# Patient Record
Sex: FEMALE | Race: WHITE | NOT HISPANIC OR LATINO | Employment: UNEMPLOYED | URBAN - METROPOLITAN AREA
[De-identification: names, ages, dates, MRNs, and addresses within clinical notes are randomized per-mention and may not be internally consistent; named-entity substitution may affect disease eponyms.]

---
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
## 2018-01-10 NOTE — MISCELLANEOUS
Message  Returned Samantha's phone call today  She wanted to discuss the frequency in her headaches after initiation of oral contraception  Hematuria states that she has been having 2 migraines a week for the past several months since onset of this new medication back in January 20 thinks that her blood pressures have been normal during this time  She has not had them officially checked  Last time she recalls her blood pressure being checked was in January with the initiation of the OCP  Has not had any recent blood work performed to check her electrolytes  States that headaches are now about once per week  I asked Samantha's to have her blood pressure checked at the [facility]  Mom is going to check to see if she could have her blood work done this week close by school and if not then next week when she returns home after finals are complete  Kalia Mahan to call the office tomorrow with blood pressure reading  She will also notify us if she had a headache around the time of the blood pressure reading  We'll make plans to adjust medication as needed  Return as scheduled for an appointment to see me in the upcoming weeks        Signatures   Electronically signed by : ELIANA Teague ; May  1 2017  3:02PM EST                       (Author)

## 2018-01-10 NOTE — PROGRESS NOTES
Assessment    1  Pseudohypoaldosteronism, type 2 (268 04) (N25 89)    Discussion/Summary    Continue HCTZ for management of blood pressure  BP control is optimal at this time  Discussed ways to increase physical activity to help with weight management and lifestyle changes that are possible while being a college student  To make appt for nutrition on winter break  Return for follow up in 6 months  Reason For Visit  Darnell's syndrome      History of Present Illness  Enzo Farias is a 21year old female who presents for follow up of pseudohypoaldosteronism type II  She is accompanied by her mother who helps to provide the history  Enzo Farias states that she has been compliant with her medication and states that she rarely misses her medication  (Estimates once/week if that often ) Diagnosed with UTI at school recently and had been prescribed Macrobid to which she developed hives  Noted at the visit to school health on campus that her forearm blood pressure was recorded at 170/100  It was not repeated with an arm BP measurement  Occasional blood pressure checks  Estimates 1-2 migraines/week  Overall frustrated about continued weight gain  Has limited options for food on campus at school  No physical activity outside of walking to and from class  She denies any complaints of dizziness with the use of HCTZ  Able to drink and stay well hydrated  Review of Systems    Constitutional: no fever  Integumentary: no rashes  Gastrointestinal: no abdominal pain, no diarrhea and no vomiting  Respiratory: no cough  Cardiovascular: no chest pain and no lower extremity edema  Musculoskeletal: no joint pain  Neurological: as noted in HPI  Genitourinary: no hematuria and as noted in HPI    ENT: no nasal discharge  Past Medical History    The active problems and past medical history were reviewed and updated today  Social History  The social history was reviewed and is unchanged  Current Meds   1  HydroCHLOROthiazide 25 MG Oral Tablet; TAKE 1 TABLET DAILY  Requested for:   66FCB2256; Last Rx:52Gzc4169 Ordered   2  Lo Loestrin Fe 1 MG-10 MCG / 10 MCG Oral Tablet; TAKE 1 TABLET DAILY AS   DIRECTED; Therapy: 67ILO8477 to [phone])  Requested for: 71XUV1203; Last   Rx:73Scq7311 Ordered    Allergies    1  Seasonal    Vitals  Vital Signs    Recorded: 01DHI0949 08:26AM   Heart Rate 85, L Radial   Pulse Quality Normal, L Radial   Systolic 829, RUE, Sitting   Diastolic 68, RUE, Sitting   BP CUFF SIZE Large   Height 5 ft 3 in   Weight 218 lb    BMI Calculated 38 62   BSA Calculated 2 01   BMI Percentile 99 %   2-20 Stature Percentile 31 %   2-20 Weight Percentile 99 %     Physical Exam    Constitutional   General appearance: No acute distress, well appearing and well nourished  obese  Head and Face   Head and face: Normal     Eyes   Conjunctiva and lids: No swelling, erythema or discharge  glasses  Pupils and irises: Equal, round, reactive to light  Ears, Nose, Mouth, and Throat   External inspection of ears and nose: Normal     Otoscopic examination: Tympanic membranes translucent with normal light reflex  Canals patent without erythema  Hearing: Normal     Nasal mucosa, septum, and turbinates: Normal without edema or erythema  Lips, teeth, and gums: Normal, good dentition  Oropharynx: Normal with no erythema, edema, exudate or lesions  Neck   Neck: Supple, symmetric, trachea midline, no masses  Pulmonary   Respiratory effort: No increased work of breathing or signs of respiratory distress  Auscultation of lungs: Clear to auscultation  Cardiovascular   Auscultation of heart: Normal rate and rhythm, normal S1 and S2, no murmurs  Pedal pulses: 2+ bilaterally  Examination of extremities for edema and/or varicosities: Normal     Abdomen   Abdomen: Non-tender, no masses  Liver and spleen: No hepatomegaly or splenomegaly     Lymphatic   Palpation of lymph nodes in neck: No lymphadenopathy  Musculoskeletal   Digits and nails: Normal without clubbing or cyanosis  Skin   Skin and subcutaneous tissue: Normal without rashes or lesions  Neurologic   Cranial nerves: Cranial nerves II-XII intact  Psychiatric   Judgment and insight: Normal     Mood and affect: Normal        Future Appointments    Date/Time Provider Specialty Site   03/29/2018 09:00 AM ELIANA Mares   Pediatrics [facility] 21     Signatures   Electronically signed by : ELIANA Marc ; Oct 16 2017 10:25AM EST                       (Author)

## 2018-01-11 NOTE — PROGRESS NOTES
Assessment    1  Pseudohypoaldosteronism, 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) (N25 89)   2  Obesity (278 00) (E66 9)    Plan    · [facility]  Co-Management  *  Status: Active  Requested for: 68CEA4147   Ordered; For: Obesity; Ordered By: Kendra Zaldivar Performed:  Due: 09TLD6439; Last Updated By: Vilma Maurice; 5/22/2017 10:23:27 AM  Care Summary provided  : Yes    Discussion/Summary    Discussed with Laurence and her mother the importance of continued physical activity in addition to compliance with HCTZ  Reviewed recent labs with Laurence  Potassium is stable  Elevated blood pressure today during her visit  I would like for Laurence to do daily blood pressures for the next week and call the office with the readings  I will also refer Laurence to nutrition to help with the weight gain  I would like for Laurence to return in 3 months to reassess her progress and make adjustments if necessary  Reason For Visit  Darnell's syndrome      History of Present Illness  Laurence is an 23year old female who presents for follow up of pseudohypoaldosteronism type II  She is accompanied by her mother who helps to provide the history  Laurence states that she has been compliant with her medication and rarely misses a dose  Takes her HCTZ at the same time as her OCP  No recent illnesses, ER visits or hospitalizations since her last visit  Laurence had been having frequent headaches while up at school but since she has been home from school, has has one mild HA a few days ago  Thinks that they may have been partly related to getting used to her new OCP but mainly stress  Has gained a significant amount of weight her first year of school  Would like to lose weight as she feels uncomfortable about her body and its appearance  Would like to be more physically active  Checked BPs at home since finishing freshman year and averaging 120s/50s   She denies any complaints of dizziness with the use of HCTZ  Able to drink and stay well hydrated  Review of Systems    Constitutional: as noted in HPI  Integumentary: no rashes  Gastrointestinal: no abdominal pain  Respiratory: no cough  Cardiovascular: no lower extremity edema  Musculoskeletal: no joint pain  Neurological: as noted in HPI  Genitourinary: no dysuria and no hematuria  ENT: no nasal discharge  Past Medical History    The active problems and past medical history were reviewed and updated today  Surgical History    The surgical history was reviewed and updated today  Family History    The family history was reviewed and updated today  Social History  The social history was reviewed and is unchanged  Current Meds   1  HydroCHLOROthiazide 25 MG Oral Tablet; TAKE 1 TABLET DAILY  Requested for:   52VKD6988; Last Rx:66Fyo1063 Ordered   2  Lo Loestrin Fe 1 MG-10 MCG / 10 MCG Oral Tablet; TAKE 1 TABLET DAILY; Therapy: 53WXS7616 to Recorded    Allergies    1  No Known Drug Allergies    2  Seasonal    Vitals  Vital Signs    Recorded: 47UHC1001 08:38AM Recorded: 63CTK9546 08:36AM   Heart Rate 82    Systolic 405, RUE, Sitting    Diastolic 70, RUE, Sitting    Height  5 ft 3 in   Weight  213 lb 2 00 oz   BMI Calculated  37 75   BSA Calculated  1 99     Physical Exam    Constitutional - General appearance: No acute distress, well appearing and well nourished  obese  Head and Face - Head and face: Normocephalic, atraumatic  Eyes - Conjunctiva and lids: No injection, edema or discharge  glasses  Pupils and irises: Equal, round, reactive to light bilaterally  Ophthalmoscopic examination: Optic discs sharp  Ears, Nose, Mouth, and Throat - External inspection of ears and nose: Normal without deformities or discharge  Otoscopic examination: Tympanic membranes gray, translucent with good bony landmarks and light reflex  Canals patent without erythema   Hearing: Normal  Nasal mucosa, septum, and turbinates: Normal, no edema or discharge  Lips, teeth, and gums: Normal, good dentition  Oropharynx: Moist mucosa, normal tongue and tonsils without lesions  Neck - Neck: Supple, symmetric, no masses  Pulmonary - Respiratory effort: Normal respiratory rate and rhythm, no increased work of breathing  Auscultation of lungs: Clear bilaterally  Cardiovascular - Auscultation of heart: Regular rate and rhythm, normal S1 and S2, no murmur  Pedal pulses: Normal, 2+ bilaterally  Examination of extremities for edema and/or varicosities: Normal    Abdomen - Abdomen: Normal bowel sounds, soft, non-tender, no masses  Liver and spleen: No hepatomegaly or splenomegaly  Lymphatic - Palpation of lymph nodes in neck: No anterior or posterior cervical lymphadenopathy  Musculoskeletal - Digits and nails: Normal without clubbing or cyanosis  Neurologic - Cranial nerves: Normal    Psychiatric - judgment and insight: Normal  Mood and affect: Normal       Results/Data  (1) BASIC METABOLIC PROFILE [id number] 10:30AM Iván Lovett Order Number: [id number]     Test Name Result Flag Reference   GLUCOSE,RANDM 97 mg/dL     If the patient is fasting, the ADA then defines impaired fasting glucose as > 100 mg/dL and diabetes as > or equal to 123 mg/dL  SODIUM 141 mmol/L  136-145   POTASSIUM 4 3 mmol/L  3 5-5 3   CHLORIDE 108 mmol/L  100-108   CARBON DIOXIDE 25 mmol/L  21-32   ANION GAP (CALC) 8 mmol/L  4-13   BLOOD UREA NITROGEN 12 mg/dL  5-25   CREATININE 0 64 mg/dL  0 60-1 30   Standardized to IDMS reference method   CALCIUM 9 4 mg/dL  8 3-10 1   eGFR Non-African American      >60 0 ml/min/1 73[address] [facility] Disease Education Program recommendations are as follows:  GFR calculation is accurate only with a steady state creatinine  Chronic Kidney disease less than 60 ml/min/1 73 sq  meters  Kidney failure less than 15 ml/min/1 73 sq  meters       (1) MAGNESIUM [id number] 10:30AM Vincent SARGENT Order Number: [id number]     Test Name Result Flag Reference   MAGNESIUM 2 2 mg/dL  1 6-2 6       Future Appointments    Date/Time Provider Specialty Site   07/19/2017 08:30 AM Collette Rusk, M D  Obstetrics/Gynecology [facility]   08/02/2017 11:00 AM ELIANA Levy   Pediatrics [facility]     Signatures   Electronically signed by : ELIANA Palomino ; May 22 2017 12:04PM EST                       (Author)

## 2018-01-12 VITALS
HEART RATE: 85 BPM | SYSTOLIC BLOOD PRESSURE: 120 MMHG | WEIGHT: 218 LBS | HEIGHT: 63 IN | DIASTOLIC BLOOD PRESSURE: 68 MMHG | BODY MASS INDEX: 38.62 KG/M2

## 2018-01-12 NOTE — PROGRESS NOTES
Assessment    1  Pseudohypoaldosteronism, 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) (N25 89)   2  Obesity (278 00) (E66 9)    Plan    · (1) HEMOGLOBIN A1C; Status:Active; Requested for:[id number];    Perform:[facility]; WXE:[id number];NNIRANQ;  For:Elevated glycated hemoglobin, Obesity; Ordered By:Shawn Vanegas;    · (1) COMPREHENSIVE METABOLIC PANEL; Status:Active; Requested for:[id number];    Perform:[facility]; TQT:[id number];HBYTCKX; For:Pseudohypoaldosteronism, type 2; Ordered By:Shawn Vanegas; Discussion/Summary    Discussed with Laurence and her mother the importance of continued physical activity in addition to compliance with HCTZ  Will have Laurence go for labs today to trend A1c (borderline previously) as well as a CMP to check electrolytes  To have BP check at school or home if having headaches to ensure that we don't have to change dose of HCTZ  Reviewed reasons to seek evaluation and to contact the office  Will plan for repeat labs in 6 months with yearly follow up in the summer when she is home from college  Good luck with school!     The treatment plan was reviewed with the patient/guardian  The patient/guardian understands and agrees with the treatment plan      Reason For Visit  Darnell's syndrome      History of Present Illness  Laurence is an 23year old female who presents for follow up of pseudohypoaldosteronism type II  She is accompanied by her mother who helps to provide the history  She admits that she still misses her medications on average 1- 2 times/week  No recent illnesses, ER visits or hospitalizations since her last visit  Feeling for the most part well- currently working at [employer] and to start as a freshman at [employer] in the fall  Laurence denies frequent headaches and is currently averaging around once a month  Occasionally she will have 2-3 per month but not as frequent as noted previously  Laurence has been staying more active and has lost some weight   She denies any complaints of dizziness with the use of HCTZ  Able to drink and stay well hydrated  Prior lab work revealed stable renal function, borderline A1c of 5 6% and normal TFTs and lipid panel  Aranza Mcleod continues to suffer from migraine headaches  Last headache was yesterday but prior to that approximately 2 weeks ago  Has not checked her BPs at the times of headaches  Having different things that seem to trigger them  No recent fevers or illnesses  Did have a sprain to her ankle a few months prior that has resolved but continues to have issues regarding pain and endurance with activities that she attributes to this  Trying to stay active  Having issues with increased weight gain  Tends to eat a lot at meal times and also late night snacks after dinner seems to be a particular issue per dad  Review of Systems    Constitutional: no fever and as noted in HPI  Gastrointestinal: no abdominal pain and no vomiting  Respiratory: no cough  Cardiovascular: no lower extremity edema  Musculoskeletal: as noted in HPI  Neurological: as noted in HPI  Genitourinary: no dysuria and no hematuria  ENT: no nasal discharge  Past Medical History    The active problems and past medical history were reviewed and updated today  Surgical History    The surgical history was reviewed and updated today  Family History    The family history was reviewed and updated today  Social History  The social history was reviewed and is unchanged  Current Meds   1  Advil CAPS; Therapy: (Recorded:16Mar2015) to Recorded   2  Hydrochlorothiazide 25 MG Oral Tablet; TAKE 1 TABLET DAILY  Requested for:   [id number]; Last Rx:06Apr2016 Ordered    Allergies    1   No Known Drug Allergies    Vitals  Vital Signs [Data Includes: Current Encounter]    Recorded: [id number] 09:25AM Recorded: 15OSD8582 09:22AM   Heart Rate 73     Blood Pressure 68 mm Hg, RUE, Sitting 128 mm Hg, RUE, Sitting    Height   5 ft 2 2 in   Weight   173 lb 2 00 oz   BMI Calculated   31 46 kg/m2   BSA Calculated   1 8 m2     Physical Exam    Constitutional - General appearance: No acute distress, well appearing and well nourished  overweight female  Head and Face - Head and face: Normocephalic, atraumatic  Eyes - Conjunctiva and lids: No injection, edema or discharge  glasses  Pupils and irises: Equal, round, reactive to light bilaterally  Ophthalmoscopic examination: Optic discs sharp  Ears, Nose, Mouth, and Throat - External inspection of ears and nose: Normal without deformities or discharge  Otoscopic examination: Tympanic membranes gray, translucent with good bony landmarks and light reflex  Canals patent without erythema  Hearing: Normal  Nasal mucosa, septum, and turbinates: Normal, no edema or discharge  Lips, teeth, and gums: Normal, good dentition  Oropharynx: Moist mucosa, normal tongue and tonsils without lesions  Neck - Neck: Supple, symmetric, no masses  Pulmonary - Respiratory effort: Normal respiratory rate and rhythm, no increased work of breathing  Auscultation of lungs: Clear bilaterally  Cardiovascular - Auscultation of heart: Regular rate and rhythm, normal S1 and S2, no murmur  Pedal pulses: Normal, 2+ bilaterally  Examination of extremities for edema and/or varicosities: Normal    Abdomen - Abdomen: Normal bowel sounds, soft, non-tender, no masses  Liver and spleen: No hepatomegaly or splenomegaly  Lymphatic - Palpation of lymph nodes in neck: No anterior or posterior cervical lymphadenopathy  Musculoskeletal - Digits and nails: Normal without clubbing or cyanosis     Skin - Skin and subcutaneous tissue: Normal    Neurologic - Cranial nerves: Normal    Psychiatric - judgment and insight: Normal  Mood and affect: Normal       Signatures   Electronically signed by : ELIANA Reyna ; Jul 6 2016 10:07AM EST                       (Author)

## 2018-01-13 VITALS
WEIGHT: 208 LBS | HEIGHT: 63 IN | HEART RATE: 79 BPM | BODY MASS INDEX: 36.86 KG/M2 | DIASTOLIC BLOOD PRESSURE: 88 MMHG | SYSTOLIC BLOOD PRESSURE: 132 MMHG

## 2018-01-14 VITALS
DIASTOLIC BLOOD PRESSURE: 70 MMHG | BODY MASS INDEX: 37.76 KG/M2 | HEIGHT: 63 IN | SYSTOLIC BLOOD PRESSURE: 136 MMHG | WEIGHT: 213.13 LBS | HEART RATE: 82 BPM

## 2018-01-23 VITALS
DIASTOLIC BLOOD PRESSURE: 74 MMHG | WEIGHT: 221 LBS | HEIGHT: 63 IN | BODY MASS INDEX: 39.16 KG/M2 | SYSTOLIC BLOOD PRESSURE: 120 MMHG

## 2018-03-29 ENCOUNTER — OFFICE VISIT (OUTPATIENT)
Dept: NEPHROLOGY | Facility: CLINIC | Age: 18
End: 2018-03-29
Payer: COMMERCIAL

## 2018-03-29 VITALS
HEIGHT: 63 IN | WEIGHT: 225.4 LBS | BODY MASS INDEX: 39.94 KG/M2 | DIASTOLIC BLOOD PRESSURE: 66 MMHG | SYSTOLIC BLOOD PRESSURE: 118 MMHG

## 2018-03-29 DIAGNOSIS — N25.89 PSEUDOHYPOALDOSTERONISM, TYPE 2: Primary | ICD-10-CM

## 2018-03-29 PROCEDURE — 99213 OFFICE O/P EST LOW 20 MIN: CPT | Performed by: PEDIATRICS

## 2018-03-29 RX ORDER — NORETHINDRONE ACETATE AND ETHINYL ESTRADIOL, ETHINYL ESTRADIOL AND FERROUS FUMARATE 1MG-10(24)
KIT ORAL
COMMUNITY
Start: 2018-03-29 | End: 2018-07-23

## 2018-03-29 RX ORDER — ONDANSETRON 8 MG/1
TABLET, ORALLY DISINTEGRATING ORAL AS NEEDED
Refills: 1 | COMMUNITY
Start: 2018-03-02 | End: 2019-11-11

## 2018-03-29 RX ORDER — HYDROCHLOROTHIAZIDE 25 MG/1
25 TABLET ORAL DAILY
Qty: 90 TABLET | Refills: 1 | Status: SHIPPED | OUTPATIENT
Start: 2018-03-29 | End: 2018-11-09 | Stop reason: SDUPTHER

## 2018-03-29 RX ORDER — NAPROXEN 500 MG/1
500 TABLET ORAL 2 TIMES DAILY
Refills: 1 | COMMUNITY
Start: 2018-03-02 | End: 2018-07-23

## 2018-03-29 RX ORDER — HYDROCHLOROTHIAZIDE 25 MG/1
1 TABLET ORAL DAILY
COMMUNITY
End: 2018-03-29 | Stop reason: SDUPTHER

## 2018-03-29 NOTE — PROGRESS NOTES
Pediatric Nephrology Follow Up   Name:Samantha Whitman    WWN:[id number]    Date:3/29/2018        Assessment/Plan   Assessment:   70-year-old female with  Pseudo hypoaldosteronism type 2  Plan:  Diagnoses and all orders for this visit:    Pseudohypoaldosteronism, type 2  -     hydrochlorothiazide (HYDRODIURIL) 25 mg tablet; Take 1 tablet (25 mg total) by mouth daily  -     Basic metabolic panel; Future  -     Magnesium; Future    Other orders  -     LO LOESTRIN FE 1 MG-10 MCG / 10 MCG TABS;   -     naproxen (NAPROSYN) 500 mg tablet; Take 500 mg by mouth 2 (two) times a day  -     Discontinue: hydrochlorothiazide (HYDRODIURIL) 25 mg tablet; Take 1 tablet by mouth daily  -     ondansetron (ZOFRAN-ODT) 8 mg disintegrating tablet; as needed      Patient Instructions   1  Psuedohypoaldosteronism type 2: Continue on current dose of hydrochlorothiazide  BP stable and well controlled  Will check BMP and Mg  Agree with Endocrine evaluation and keep appointment with Nutrition to help with weight management  Follow up in 6 months  HPI: lEana Stephenson is a 21 y  o female who presents for follow up of   Chief Complaint   Patient presents with    Follow-up     Elana Stephenson is accompanied by Her mother who assists in providing the history today  Natasha Munoz has been doing well since her last visit in Nephrology  She states that blood pressures have been stable and well controlled  Has intermittent checks of her blood pressure at [employer] while on campus  Denies any episodes of dizziness  Continues to have headaches although the frequency has improved  States on average 0-1 headaches every 1-2 weeks  Continues to gain weight and has not yet been in to see the nutritionist   Scheduled for May after she finishes this semester at school  No missed doses of her medication  Natasha Munoz to schedule with Endocrinology for evaluation of obesity and excessive sweating        Review of Systems  Constitutional:   Negative for fevers, fatigue or malaise  HEENT: negative for vision or hearing changes  Positive for rhinorrhea, congestion or sore throat  Respiratory: negative for shortness of breath  Positive for cough  Cardiovascular: negative for chest pain, facial or lower extremity edema  Gastrointestinal: negative for abdominal pain, nausea, vomiting or diarrhea  Genitourinary: negative for dysuria, hematuria  Endocrine: as noted above  Musculoskeletal: negative for joint pain or swelling, back pain  Neurologic: negative for dizziness  Hematologic: negative for bruising or bleeding  Integumentary: negative for rashes  Psychiatric/Behavioral: no behavioral changes    The remainder of review of systems as noted per HPI  ? Past Medical History:   Diagnosis Date    Contraception     Elevated glycated hemoglobin     History of asthma     History of UTI     Injury of foot, right     Injury of knee, right     Injury, ankle     Late menses     Obesity     Pain in finger of right hand     Pain in right wrist     Pseudohypoaldosteronism, type 2     Severe headache     Sprain and strain of deltoid (ligament) of ankle      Past Surgical History:   Procedure Laterality Date    TOOTH EXTRACTION        Family History   Problem Relation Age of Onset    Hypertension Mother     Nephrolithiasis Mother     No Known Problems Father     Hypertension Maternal Grandfather     Heart disease Maternal Grandfather     Hypertension Paternal Grandmother     Hypertension Paternal Grandfather     Heart disease Paternal Grandfather     Breast cancer Neg Hx      paternal cousin      Social History     Social History    Marital status: Single     Spouse name: N/A    Number of children: N/A    Years of education: N/A     Occupational History    Not on file       Social History Main Topics    Smoking status: Never Smoker    Smokeless tobacco: Never Used    Alcohol use Yes    Drug use: No    Sexual activity: Not on file     Other Topics Concern    Not on file     Social History Narrative    No narrative on file       Allergies   Allergen Reactions    Nitrofurantoin Hives     Reaction Date: 02Oct2017;     Pollen Extract         Current Outpatient Prescriptions:     hydrochlorothiazide (HYDRODIURIL) 25 mg tablet, Take 1 tablet (25 mg total) by mouth daily, Disp: 90 tablet, Rfl: 1    LO LOESTRIN FE 1 MG-10 MCG / 10 MCG TABS, , Disp: , Rfl:     naproxen (NAPROSYN) 500 mg tablet, Take 500 mg by mouth 2 (two) times a day, Disp: , Rfl: 1    ondansetron (ZOFRAN-ODT) 8 mg disintegrating tablet, as needed, Disp: , Rfl: 1     Objective   Vitals:    03/29/18 0903   BP: 118/66     Height:5' 3 27" (1 607 m)  Weight:102 kg (225 lb 6 4 oz)  BMI: Body mass index is 39 59 kg/m²      Physical Exam:  General: Obese  Awake, alert and in no acute distress  HEENT:  +glasses  Normocephalic, atraumatic, pupils equally round and reactive to light, extraocular movement intact, conjunctiva clear with no discharge  Ears normally set with tympanic membranes visualized  Tympanic membranes without erythema or effusion and canals clear  Nares patent with no discharge  Mucous membranes moist and oropharynx is clear with no erythema or exudate present  Normal dentition  Chest: Normal without deformity  Neck: supple, symmetric with no masses, no cervical lymphadenopathy  Lungs: clear to auscultation bilaterally with no wheezes, rales or rhonchi  Cardiovascular:   Normal S1 and S2  No murmurs, rubs or gallops  Regular rate and rhythm  Abdomen:  Soft, nontender, and nondistended  Normoactive bowel sounds  No hepatosplenomegaly present  Genitourinary:  Deferred  Back:  Straight without deformity  Skin: warm and well perfused  No rashes present  Clothes noted to be damp along legs and abdomen  Extremities:  No cyanosis, clubbing or edema  Pulses 2+ bilaterally  Musculoskeletal:   Full range of motion all four extremities  No joint swelling or tenderness noted  Neurologic: grossly normal neurologic exam with no deficits noted    Psychiatric: normal mood and affect     Lab Results: none  Imaging: none  Other Studies: none    All laboratory results and imaging was reviewed by me and summarized above

## 2018-03-29 NOTE — PATIENT INSTRUCTIONS
1  Psuedohypoaldosteronism type 2: Continue on current dose of hydrochlorothiazide  BP stable and well controlled  Will check BMP and Mg  Agree with Endocrine evaluation and keep appointment with Nutrition to help with weight management  Follow up in 6 months  (If labs are stable, may push out to yearly)

## 2018-03-29 NOTE — LETTER
March 29, 2018     [address]  [address]    Patient: Elana Stephenson   YOB: 2000   Date of Visit: 3/29/2018       Dear Dr Jacquie Webb: Thank you for referring Anthony Ivyadrianna to me for evaluation  Below are my notes for this consultation  If you have questions, please do not hesitate to call me  I look forward to following your patient along with you  Sincerely,        Luther Velasquez MD        CC: No Recipients  Luther Velasquez MD  3/29/2018  1:05 PM  Sign at close encounter    Pediatric Nephrology Follow Up   Ingrid Johns    HGJ:[id number]    Date:3/29/2018        Assessment/Plan   Assessment:   42-year-old female with  Pseudo hypoaldosteronism type 2  Plan:  Diagnoses and all orders for this visit:    Pseudohypoaldosteronism, type 2  -     hydrochlorothiazide (HYDRODIURIL) 25 mg tablet; Take 1 tablet (25 mg total) by mouth daily  -     Basic metabolic panel; Future  -     Magnesium; Future    Other orders  -     LO LOESTRIN FE 1 MG-10 MCG / 10 MCG TABS;   -     naproxen (NAPROSYN) 500 mg tablet; Take 500 mg by mouth 2 (two) times a day  -     Discontinue: hydrochlorothiazide (HYDRODIURIL) 25 mg tablet; Take 1 tablet by mouth daily  -     ondansetron (ZOFRAN-ODT) 8 mg disintegrating tablet; as needed      Patient Instructions   1  Psuedohypoaldosteronism type 2: Continue on current dose of hydrochlorothiazide  BP stable and well controlled  Will check BMP and Mg  Agree with Endocrine evaluation and keep appointment with Nutrition to help with weight management  Follow up in 6 months  HPI: Elana Stephenson is a 21 y  o female who presents for follow up of   Chief Complaint   Patient presents with    Follow-up     Elana Stephenson is accompanied by Her mother who assists in providing the history today  Laurence has been doing well since her last visit in Nephrology    She states that blood pressures have been stable and well controlled  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  Denies any episodes of dizziness  Continues to have headaches although the frequency has improved  States on average 0-1 headaches every 1-2 weeks  Continues to gain weight and has not yet been in to see the nutritionist   Scheduled for May after she finishes this semester at school  No missed doses of her medication  Laurence to schedule with Endocrinology for evaluation of obesity and excessive sweating  Review of Systems  Constitutional:   Negative for fevers, fatigue or malaise  HEENT: negative for vision or hearing changes  Positive for rhinorrhea, congestion or sore throat  Respiratory: negative for shortness of breath  Positive for cough  Cardiovascular: negative for chest pain, facial or lower extremity edema  Gastrointestinal: negative for abdominal pain, nausea, vomiting or diarrhea  Genitourinary: negative for dysuria, hematuria  Endocrine: as noted above  Musculoskeletal: negative for joint pain or swelling, back pain  Neurologic: negative for dizziness  Hematologic: negative for bruising or bleeding  Integumentary: negative for rashes  Psychiatric/Behavioral: no behavioral changes    The remainder of review of systems as noted per HPI  ?       Past Medical History:   Diagnosis Date    Contraception     Elevated glycated hemoglobin     History of asthma     History of UTI     Injury of foot, right     Injury of knee, right     Injury, ankle     Late menses     Obesity     Pain in finger of right hand     Pain in right wrist     Pseudohypoaldosteronism, type 2     Severe headache     Sprain and strain of deltoid (ligament) of ankle      Past Surgical History:   Procedure Laterality Date    TOOTH EXTRACTION        Family History   Problem Relation Age of Onset    Hypertension Mother     Nephrolithiasis Mother     No Known Problems Father    Jaimee Bland Hypertension Maternal Grandfather     Heart disease Maternal Grandfather     Hypertension Paternal Grandmother     Hypertension Paternal Grandfather     Heart disease Paternal Grandfather     Breast cancer Neg Hx      paternal cousin      Social History     Social History    Marital status: Single     Spouse name: N/A    Number of children: N/A    Years of education: N/A     Occupational History    Not on file  Social History Main Topics    Smoking status: Never Smoker    Smokeless tobacco: Never Used    Alcohol use Yes    Drug use: No    Sexual activity: Not on file     Other Topics Concern    Not on file     Social History Narrative    No narrative on file       Allergies   Allergen Reactions    Nitrofurantoin Hives     Reaction Date: 02Oct2017;     Pollen Extract         Current Outpatient Prescriptions:     hydrochlorothiazide (HYDRODIURIL) 25 mg tablet, Take 1 tablet (25 mg total) by mouth daily, Disp: 90 tablet, Rfl: 1    LO LOESTRIN FE 1 MG-10 MCG / 10 MCG TABS, , Disp: , Rfl:     naproxen (NAPROSYN) 500 mg tablet, Take 500 mg by mouth 2 (two) times a day, Disp: , Rfl: 1    ondansetron (ZOFRAN-ODT) 8 mg disintegrating tablet, as needed, Disp: , Rfl: 1     Objective   Vitals:    03/29/18 0903   BP: 118/66     Height:5' 3 27" (1 607 m)  Weight:102 kg (225 lb 6 4 oz)  BMI: Body mass index is 39 59 kg/m²      Physical Exam:  General: Obese  Awake, alert and in no acute distress  HEENT:  +glasses  Normocephalic, atraumatic, pupils equally round and reactive to light, extraocular movement intact, conjunctiva clear with no discharge  Ears normally set with tympanic membranes visualized  Tympanic membranes without erythema or effusion and canals clear  Nares patent with no discharge  Mucous membranes moist and oropharynx is clear with no erythema or exudate present  Normal dentition    Chest: Normal without deformity  Neck: supple, symmetric with no masses, no cervical lymphadenopathy  Lungs: clear to auscultation bilaterally with no wheezes, rales or rhonchi  Cardiovascular:   Normal S1 and S2  No murmurs, rubs or gallops  Regular rate and rhythm  Abdomen:  Soft, nontender, and nondistended  Normoactive bowel sounds  No hepatosplenomegaly present  Genitourinary:  Deferred  Back:  Straight without deformity  Skin: warm and well perfused  No rashes present  Clothes noted to be damp along legs and abdomen  Extremities:  No cyanosis, clubbing or edema  Pulses 2+ bilaterally  Musculoskeletal:   Full range of motion all four extremities  No joint swelling or tenderness noted  Neurologic: grossly normal neurologic exam with no deficits noted    Psychiatric: normal mood and affect     Lab Results: none  Imaging: none  Other Studies: none    All laboratory results and imaging was reviewed by me and summarized above

## 2018-05-14 ENCOUNTER — CLINICAL SUPPORT (OUTPATIENT)
Dept: NUTRITION | Facility: HOSPITAL | Age: 18
End: 2018-05-14
Payer: COMMERCIAL

## 2018-05-14 VITALS — WEIGHT: 227.8 LBS | HEIGHT: 62 IN | BODY MASS INDEX: 41.92 KG/M2

## 2018-05-14 DIAGNOSIS — E66.09 CLASS 2 OBESITY DUE TO EXCESS CALORIES WITHOUT SERIOUS COMORBIDITY WITH BODY MASS INDEX (BMI) OF 39.0 TO 39.9 IN ADULT: ICD-10-CM

## 2018-05-14 PROCEDURE — 97802 MEDICAL NUTRITION INDIV IN: CPT

## 2018-05-14 NOTE — PROGRESS NOTES
Initial Nutrition Assessment Form    Patient Name: Dionne Martines    YOB: 2000    Sex: Female     Assessment Date: 5/14/2018  Start Time: 9:30am Stop Time: 10:30am Total Minutes: 60min     Data:  Present at session: self and mother   Patient Concerns: " Weight gain and wishes to lose weight to feel better"   Medical Dx/Reason for Referral: E66 [id number]   Past Medical History:   Diagnosis Date    Contraception     Elevated glycated hemoglobin     History of asthma     History of UTI     Injury of foot, right     Injury of knee, right     Injury, ankle     Late menses     Obesity     Pain in finger of right hand     Pain in right wrist     Pseudohypoaldosteronism, type 2     Severe headache     Sprain and strain of deltoid (ligament) of ankle        Current Outpatient Prescriptions   Medication Sig Dispense Refill    hydrochlorothiazide (HYDRODIURIL) 25 mg tablet Take 1 tablet (25 mg total) by mouth daily 90 tablet 1    LO LOESTRIN FE 1 MG-10 MCG / 10 MCG TABS       naproxen (NAPROSYN) 500 mg tablet Take 500 mg by mouth 2 (two) times a day  1    ondansetron (ZOFRAN-ODT) 8 mg disintegrating tablet as needed  1     No current facility-administered medications for this visit  Additional Meds/Supplements: Reviewed, denies vitamins   Special Learning Needs: none   Height: HC Readings from Last 3 Encounters:   No data found for [facility]       Weight: Wt Readings from Last 3 Encounters:   03/29/18 102 kg (225 lb 6 4 oz) (99 %, Z= 2 27)*   12/21/17 100 kg (221 lb) (99 %, Z= 2 22)*   10/16/17 98 9 kg (218 lb) (99 %, Z= 2 18)*     * Growth percentiles are based on CDC 2-20 Years data  Estimated body mass index is 39 59 kg/m² as calculated from the following:    Height as of 3/29/18: 5' 3 27" (1 607 m)  Weight as of 3/29/18: 102 kg (225 lb 6 4 oz)         Recent Weight Change: [x]Yes     []No  Amount:  weight gain      Energy Needs: [facility] Equation:  7130 x 1 3= 2269 kcal= 1400 kcal for weight loss   Allergies   Allergen Reactions    Nitrofurantoin Hives     Reaction Date: [id number];     Pollen Extract        History   Alcohol Use    Yes       History   Smoking Status    Never Smoker   Smokeless Tobacco    Never Used       Who shops? mother   Who cooks? mother   Exercise: No formal at this time   Prior Counseling? [x]Yes     []No  When:    Why:         Diet Hx:  Breakfast:  Hazelnut Coffee and 3 creamers and brown sugar  [employer] egg/cornejo sandwich    Lunch: Bowl of Velveeta Mac and Cheese or McDonalds quarter [employer] and fries and soda         Dinner:  Cheeseburger, Pasta, baked Beans         Snacks: Brownies, cake, yogurt sundae        Nutrition Diagnosis:   Overweight/obesity  related to Excess energy intake as evidenced by  BMI more than normative standard for age and sex (obesity-grade II 35-39  9)       Medical Nutrition Therapy Intervention:  [x]Individualized Meal Plan []Understanding Lab Values   [x]Basic Pathophysiology of Disease []Food/Medication Interactions   [x]Food Diary []Exercise   [x]Lifestyle/Behavior Modification Techniques []Medication, Mechanism of Action   [x]Label Reading []Self Blood Glucose Monitoring   [x]Weight/BMI Goals Achieve first goal 204lb (-10% current weight) then BMI <30 []Other -    Other Notes:        Comprehension: []Excellent  []Very Good  [x]Good  []Fair   []Poor    Receptivity: []Excellent  []Very Good  [x]Good  []Fair   []Poor    Expected Compliance: []Excellent  []Very Good  [x]Good  []Fair   []Poor        Goals: 1  Achieve 204 lb as 1st goal (10% of current weight)   2  Food Journal daily with dontae   3  report reading food labels for fiber and protein       2 week follow up  Labs:  CMP  Lab Results   Component Value Date     05/15/2017    K 4 3 05/15/2017     05/15/2017    CO2 25 05/15/2017    ANIONGAP 8 05/15/2017    BUN 12 05/15/2017    CREATININE 0 64 05/15/2017    GLUCOSE 97 05/15/2017    CALCIUM 9 4 05/15/2017    AST 12 08/01/2016    ALT 16 08/01/2016    ALKPHOS 78 08/01/2016    PROT 7 2 08/01/2016    BILITOT 0 20 08/01/2016    EGFR >60 0 05/15/2017       BMP  Lab Results   Component Value Date    GLUCOSE 97 05/15/2017    CALCIUM 9 4 05/15/2017     05/15/2017    K 4 3 05/15/2017    CO2 25 05/15/2017     05/15/2017    BUN 12 05/15/2017    CREATININE 0 64 05/15/2017       Lipids  Lab Results   Component Value Date    CHOL 138 11/01/2015     Lab Results   Component Value Date    HDL 52 11/01/2015     Lab Results   Component Value Date    LDLCALC 75 11/01/2015     Lab Results   Component Value Date    TRIG 54 11/01/2015     No components found for: CHOLHDL    Hemoglobin A1C  Lab Results   Component Value Date    HGBA1C 5 6 08/01/2016       Fasting Glucose  No results found for: GLUF    Insulin     Thyroid  No results found for: TSH, P4JLPSA, V9IEBQC, THYROIDAB    Hepatic Function Panel  Lab Results   Component Value Date    ALT 16 08/01/2016    AST 12 08/01/2016    ALKPHOS 78 08/01/2016    BILITOT 0 20 08/01/2016       Celiac Disease Antibody Panel  No results found for: ENDOMYSIAL IGA, GLIADIN IGA, GLIADIN IGG, IGA, TISSUE TRANSGLUT AB, TTG IGA   Iron  No results found for: IRON, TIBC, FERRITIN    Vitamins  No results found for: VITAMIN B2   No results found for: NICOTINAMIDE, NICOTINIC ACID   No results found for: VITAMINB6  No results found for: [id number]  No results found for: VITB5  No results found for: M7OLYCUO  No results found for: THYROGLB  No results found for: VITAMIN K   No results found for: 25-HYDROXY VIT D   No results found for: VITAMINE     [unfilled]  [address] [place] [id number]

## 2018-05-17 ENCOUNTER — APPOINTMENT (OUTPATIENT)
Dept: LAB | Facility: CLINIC | Age: 18
End: 2018-05-17
Payer: COMMERCIAL

## 2018-05-17 ENCOUNTER — TRANSCRIBE ORDERS (OUTPATIENT)
Dept: LAB | Facility: CLINIC | Age: 18
End: 2018-05-17

## 2018-05-17 DIAGNOSIS — N25.89 PSEUDOHYPOALDOSTERONISM, TYPE 2: ICD-10-CM

## 2018-05-17 LAB
EST. AVERAGE GLUCOSE BLD GHB EST-MCNC: 114 MG/DL
HBA1C MFR BLD: 5.6 % (ref 4.2–6.3)
MAGNESIUM SERPL-MCNC: 2.2 MG/DL (ref 1.6–2.6)
T4 FREE SERPL-MCNC: 1 NG/DL (ref 0.78–1.33)
TSH SERPL DL<=0.05 MIU/L-ACNC: 1.89 UIU/ML (ref 0.46–3.98)

## 2018-05-17 PROCEDURE — 36415 COLL VENOUS BLD VENIPUNCTURE: CPT

## 2018-05-17 PROCEDURE — 84439 ASSAY OF FREE THYROXINE: CPT

## 2018-05-17 PROCEDURE — 83036 HEMOGLOBIN GLYCOSYLATED A1C: CPT

## 2018-05-17 PROCEDURE — 84443 ASSAY THYROID STIM HORMONE: CPT

## 2018-05-17 PROCEDURE — 83735 ASSAY OF MAGNESIUM: CPT

## 2018-05-21 DIAGNOSIS — N25.89 PSEUDOHYPOALDOSTERONISM, TYPE 2: Primary | ICD-10-CM

## 2018-07-16 ENCOUNTER — APPOINTMENT (OUTPATIENT)
Dept: LAB | Facility: HOSPITAL | Age: 18
End: 2018-07-16
Attending: OBSTETRICS & GYNECOLOGY
Payer: COMMERCIAL

## 2018-07-16 ENCOUNTER — TELEPHONE (OUTPATIENT)
Dept: NEPHROLOGY | Facility: CLINIC | Age: 18
End: 2018-07-16

## 2018-07-16 ENCOUNTER — HOSPITAL ENCOUNTER (OUTPATIENT)
Dept: RADIOLOGY | Facility: HOSPITAL | Age: 18
Discharge: HOME | End: 2018-07-16
Attending: OBSTETRICS & GYNECOLOGY
Payer: COMMERCIAL

## 2018-07-16 ENCOUNTER — TRANSCRIBE ORDERS (OUTPATIENT)
Dept: LAB | Facility: HOSPITAL | Age: 18
End: 2018-07-16

## 2018-07-16 ENCOUNTER — TELEPHONE (OUTPATIENT)
Dept: OBGYN CLINIC | Facility: CLINIC | Age: 18
End: 2018-07-16

## 2018-07-16 DIAGNOSIS — O20.0 THREATENED ABORTION: ICD-10-CM

## 2018-07-16 DIAGNOSIS — N25.89 PSEUDOHYPOALDOSTERONISM, TYPE 2: ICD-10-CM

## 2018-07-16 DIAGNOSIS — O20.0 THREATENED ABORTION: Primary | ICD-10-CM

## 2018-07-16 LAB
ANION GAP SERPL CALCULATED.3IONS-SCNC: 6 MMOL/L (ref 4–13)
B-HCG SERPL-ACNC: ABNORMAL MIU/ML
BUN SERPL-MCNC: 7 MG/DL (ref 5–25)
CALCIUM SERPL-MCNC: 8.6 MG/DL (ref 8.3–10.1)
CHLORIDE SERPL-SCNC: 111 MMOL/L (ref 100–108)
CO2 SERPL-SCNC: 22 MMOL/L (ref 21–32)
CREAT SERPL-MCNC: 0.6 MG/DL (ref 0.6–1.3)
GFR SERPL CREATININE-BSD FRML MDRD: 132 ML/MIN/1.73SQ M
GLUCOSE SERPL-MCNC: 106 MG/DL (ref 65–140)
POTASSIUM SERPL-SCNC: 4.6 MMOL/L (ref 3.5–5.3)
SODIUM SERPL-SCNC: 139 MMOL/L (ref 136–145)

## 2018-07-16 PROCEDURE — 36415 COLL VENOUS BLD VENIPUNCTURE: CPT

## 2018-07-16 PROCEDURE — 80048 BASIC METABOLIC PNL TOTAL CA: CPT

## 2018-07-16 PROCEDURE — 84702 CHORIONIC GONADOTROPIN TEST: CPT

## 2018-07-16 PROCEDURE — 76801 OB US < 14 WKS SINGLE FETUS: CPT

## 2018-07-16 NOTE — TELEPHONE ENCOUNTER
Pt informed pelvic u/s  Results -  7 1/2 wk viable IUP, subchorionic bleed 3 9 cm (verbal to KA from radiologist)  Pt informed - only having brown bleeding presently  resched nurse prenatal appt for 7/23/18  Will r/s ob physical when here for visit    Pt to recall if increased or red bleeding, pelvic rest

## 2018-07-16 NOTE — TELEPHONE ENCOUNTER
Pt's QBHCG result from today = 35,405  KA aware  Will schedule pelvic u/s with TV scan - rad order in Epic scheduled for today @ 1:45 pm   Pt informed  Will recall with results

## 2018-07-16 NOTE — TELEPHONE ENCOUNTER
----- Message from Anthony Monroy MD sent at 7/16/2018 11:24 AM EDT -----  Please let [facility] know that blood work is stable with normal potassium level

## 2018-07-16 NOTE — TELEPHONE ENCOUNTER
Pt scheduled for OB initial appt today - cramping 7/15/18 & red-lt pink spotting yesterday & today, passed 2 small clots  Had (+) HPT 7/10/18 - LMP ? - conceived on ocps - states she missed several pills  bl type/rh is O POS  Will have QBHCG this am ([facility]) - lab order in [address]  Will recall with result

## 2018-07-17 ENCOUNTER — TELEPHONE (OUTPATIENT)
Dept: OBGYN CLINIC | Facility: CLINIC | Age: 18
End: 2018-07-17

## 2018-07-17 NOTE — TELEPHONE ENCOUNTER
Pt would like a note for no heavy lifting for work  Pt goes back to work tomorrow  Please call when ready for

## 2018-07-17 NOTE — TELEPHONE ENCOUNTER
Spoke to Laurence about her blood work coming back stable, she is aware  No other concerns at this moment

## 2018-07-23 ENCOUNTER — INITIAL PRENATAL (OUTPATIENT)
Dept: OBGYN CLINIC | Facility: CLINIC | Age: 18
End: 2018-07-23

## 2018-07-23 VITALS — HEIGHT: 62 IN | WEIGHT: 223.6 LBS | BODY MASS INDEX: 41.15 KG/M2

## 2018-07-23 DIAGNOSIS — Z34.01 ENCOUNTER FOR SUPERVISION OF NORMAL FIRST PREGNANCY IN FIRST TRIMESTER: ICD-10-CM

## 2018-07-23 DIAGNOSIS — Z36.9 ANTENATAL SCREENING ENCOUNTER: Primary | ICD-10-CM

## 2018-07-23 PROCEDURE — PNV: Performed by: OBSTETRICS & GYNECOLOGY

## 2018-07-23 NOTE — PROGRESS NOTES
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

## 2018-07-30 ENCOUNTER — ROUTINE PRENATAL (OUTPATIENT)
Dept: OBGYN CLINIC | Facility: CLINIC | Age: 18
End: 2018-07-30

## 2018-07-30 VITALS — SYSTOLIC BLOOD PRESSURE: 132 MMHG | BODY MASS INDEX: 40.71 KG/M2 | WEIGHT: 222.6 LBS | DIASTOLIC BLOOD PRESSURE: 82 MMHG

## 2018-07-30 DIAGNOSIS — Z34.81 PRENATAL CARE, SUBSEQUENT PREGNANCY, FIRST TRIMESTER: Primary | ICD-10-CM

## 2018-07-30 DIAGNOSIS — Z36.9 ANTENATAL SCREENING ENCOUNTER: ICD-10-CM

## 2018-07-30 PROCEDURE — 87070 CULTURE OTHR SPECIMN AEROBIC: CPT | Performed by: OBSTETRICS & GYNECOLOGY

## 2018-07-30 PROCEDURE — PNV: Performed by: OBSTETRICS & GYNECOLOGY

## 2018-07-30 PROCEDURE — 87491 CHLMYD TRACH DNA AMP PROBE: CPT | Performed by: OBSTETRICS & GYNECOLOGY

## 2018-07-30 PROCEDURE — 87591 N.GONORRHOEAE DNA AMP PROB: CPT | Performed by: OBSTETRICS & GYNECOLOGY

## 2018-07-30 PROCEDURE — G0145 SCR C/V CYTO,THINLAYER,RESCR: HCPCS | Performed by: OBSTETRICS & GYNECOLOGY

## 2018-07-30 NOTE — PATIENT INSTRUCTIONS
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

## 2018-07-30 NOTE — PROGRESS NOTES
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

## 2018-07-31 ENCOUNTER — TELEPHONE (OUTPATIENT)
Dept: NEPHROLOGY | Facility: CLINIC | Age: 18
End: 2018-07-31

## 2018-07-31 NOTE — TELEPHONE ENCOUNTER
Laurence called the office because she wanted to inform Dr Natalia Gramajo that she recently found out she is pregnant  She told me that her OB had concerns with her taking HCTZ and wanted her to get in contact with her nephrologist  Laurence stated that she wanted to speak with Dr Natalia Gramajo about this

## 2018-08-01 ENCOUNTER — TELEPHONE (OUTPATIENT)
Dept: OBGYN CLINIC | Facility: CLINIC | Age: 18
End: 2018-08-01

## 2018-08-01 LAB
BACTERIA GENITAL AEROBE CULT: NORMAL
CHLAMYDIA DNA CVX QL NAA+PROBE: NORMAL
LAB AP GYN PRIMARY INTERPRETATION: NORMAL
Lab: NORMAL
N GONORRHOEA DNA GENITAL QL NAA+PROBE: NORMAL

## 2018-08-01 NOTE — TELEPHONE ENCOUNTER
Had lm for pt re: f/u [facility] & nephrologist - return call - informed referral in allscripts for [facility]    Pt has been seeing pediatric nephrologist past 3-4 years - will schedule appt (Darnell's syndrome, HTN)

## 2018-08-09 ENCOUNTER — TELEPHONE (OUTPATIENT)
Dept: OBGYN CLINIC | Facility: CLINIC | Age: 18
End: 2018-08-09

## 2018-08-09 ENCOUNTER — ROUTINE PRENATAL (OUTPATIENT)
Dept: PERINATAL CARE | Facility: CLINIC | Age: 18
End: 2018-08-09
Payer: COMMERCIAL

## 2018-08-09 VITALS
DIASTOLIC BLOOD PRESSURE: 84 MMHG | SYSTOLIC BLOOD PRESSURE: 139 MMHG | BODY MASS INDEX: 40.45 KG/M2 | HEART RATE: 83 BPM | HEIGHT: 62 IN | WEIGHT: 219.8 LBS

## 2018-08-09 DIAGNOSIS — O99.211 OBESITY AFFECTING PREGNANCY IN FIRST TRIMESTER: ICD-10-CM

## 2018-08-09 DIAGNOSIS — Z36.82 ENCOUNTER FOR ANTENATAL SCREENING FOR NUCHAL TRANSLUCENCY: ICD-10-CM

## 2018-08-09 DIAGNOSIS — N25.89 PSEUDOHYPOALDOSTERONISM, TYPE 2: Primary | ICD-10-CM

## 2018-08-09 DIAGNOSIS — O10.919 CHRONIC HYPERTENSION AFFECTING PREGNANCY: ICD-10-CM

## 2018-08-09 DIAGNOSIS — Z3A.12 12 WEEKS GESTATION OF PREGNANCY: ICD-10-CM

## 2018-08-09 PROCEDURE — 76801 OB US < 14 WKS SINGLE FETUS: CPT | Performed by: OBSTETRICS & GYNECOLOGY

## 2018-08-09 PROCEDURE — 99241 PR OFFICE CONSULTATION NEW/ESTAB PATIENT 15 MIN: CPT | Performed by: OBSTETRICS & GYNECOLOGY

## 2018-08-09 PROCEDURE — 76813 OB US NUCHAL MEAS 1 GEST: CPT | Performed by: OBSTETRICS & GYNECOLOGY

## 2018-08-09 NOTE — TELEPHONE ENCOUNTER
I spoke to the patient's the spotting and cramping bleeding is now resolved  She will keep me informed of her progress the happens again

## 2018-08-09 NOTE — PATIENT INSTRUCTIONS
Thank you for choosing [facility] for your  care today  If you have any questions about your ultrasound or care, please do not hesitate to contact us or your primary obstetrician  Please begin taking aspirin 81mg daily for the prevention of preeclampsia  Please try to keep your weight gain to 11-20 pounds this pregnancy; this is best accomplished by regular aerobic exercise and healthy eating

## 2018-08-09 NOTE — TELEPHONE ENCOUNTER
Patient states when went to bathroom had some dark blood and when wiped had dark blood  Non in underwear  Negative cramping  Negative bleeding now  12 weeks pregnant   Please call [phone]

## 2018-08-12 PROBLEM — O10.919 CHRONIC HYPERTENSION AFFECTING PREGNANCY: Status: ACTIVE | Noted: 2018-08-12

## 2018-08-12 PROBLEM — O99.211 OBESITY AFFECTING PREGNANCY IN FIRST TRIMESTER: Status: ACTIVE | Noted: 2018-08-12

## 2018-08-12 PROBLEM — Z3A.12 12 WEEKS GESTATION OF PREGNANCY: Status: ACTIVE | Noted: 2018-08-12

## 2018-08-12 NOTE — PROGRESS NOTES
[address]: Ms Laura Hassan was seen at 12w2d for nuchal translucency ultrasound  See ultrasound report under "OB Procedures" tab  Please don't hesitate to contact our office with any concerns or questions    Ry Ivy MD

## 2018-08-16 ENCOUNTER — TELEPHONE (OUTPATIENT)
Dept: PERINATAL CARE | Facility: CLINIC | Age: 18
End: 2018-08-16

## 2018-08-20 ENCOUNTER — APPOINTMENT (OUTPATIENT)
Dept: LAB | Age: 18
End: 2018-08-20
Payer: COMMERCIAL

## 2018-08-20 DIAGNOSIS — Z36.9 ANTENATAL SCREENING ENCOUNTER: ICD-10-CM

## 2018-08-20 LAB
ABO GROUP BLD: NORMAL
BACTERIA UR QL AUTO: ABNORMAL /HPF
BASOPHILS # BLD AUTO: 0.03 THOUSANDS/ΜL (ref 0–0.1)
BASOPHILS NFR BLD AUTO: 0 % (ref 0–1)
BILIRUB UR QL STRIP: NEGATIVE
BLD GP AB SCN SERPL QL: NEGATIVE
CLARITY UR: CLEAR
COLOR UR: YELLOW
EOSINOPHIL # BLD AUTO: 0.04 THOUSAND/ΜL (ref 0–0.61)
EOSINOPHIL NFR BLD AUTO: 1 % (ref 0–6)
ERYTHROCYTE [DISTWIDTH] IN BLOOD BY AUTOMATED COUNT: 13.8 % (ref 11.6–15.1)
EXTERNAL HIV-1 ANTIBODY: NORMAL
GLUCOSE UR STRIP-MCNC: NEGATIVE MG/DL
HCT VFR BLD AUTO: 40.4 % (ref 34.8–46.1)
HGB BLD-MCNC: 12.5 G/DL (ref 11.5–15.4)
HGB UR QL STRIP.AUTO: ABNORMAL
HYALINE CASTS #/AREA URNS LPF: ABNORMAL /LPF
IMM GRANULOCYTES # BLD AUTO: 0.03 THOUSAND/UL (ref 0–0.2)
IMM GRANULOCYTES NFR BLD AUTO: 0 % (ref 0–2)
KETONES UR STRIP-MCNC: NEGATIVE MG/DL
LEUKOCYTE ESTERASE UR QL STRIP: NEGATIVE
LYMPHOCYTES # BLD AUTO: 1.85 THOUSANDS/ΜL (ref 0.6–4.47)
LYMPHOCYTES NFR BLD AUTO: 24 % (ref 14–44)
MCH RBC QN AUTO: 27.2 PG (ref 26.8–34.3)
MCHC RBC AUTO-ENTMCNC: 30.9 G/DL (ref 31.4–37.4)
MCV RBC AUTO: 88 FL (ref 82–98)
MONOCYTES # BLD AUTO: 0.39 THOUSAND/ΜL (ref 0.17–1.22)
MONOCYTES NFR BLD AUTO: 5 % (ref 4–12)
NEUTROPHILS # BLD AUTO: 5.32 THOUSANDS/ΜL (ref 1.85–7.62)
NEUTS SEG NFR BLD AUTO: 70 % (ref 43–75)
NITRITE UR QL STRIP: NEGATIVE
NON-SQ EPI CELLS URNS QL MICRO: ABNORMAL /HPF
NRBC BLD AUTO-RTO: 0 /100 WBCS
PH UR STRIP.AUTO: 5.5 [PH] (ref 4.5–8)
PLATELET # BLD AUTO: 212 THOUSANDS/UL (ref 149–390)
PMV BLD AUTO: 11.1 FL (ref 8.9–12.7)
PROT UR STRIP-MCNC: NEGATIVE MG/DL
RBC # BLD AUTO: 4.59 MILLION/UL (ref 3.81–5.12)
RBC #/AREA URNS AUTO: ABNORMAL /HPF
RH BLD: POSITIVE
RPR SER QL: NORMAL
RUBV IGG SERPL IA-ACNC: 76.5 IU/ML
SP GR UR STRIP.AUTO: 1.01 (ref 1–1.03)
SPECIMEN EXPIRATION DATE: NORMAL
UROBILINOGEN UR QL STRIP.AUTO: 0.2 E.U./DL
WBC # BLD AUTO: 7.66 THOUSAND/UL (ref 4.31–10.16)
WBC #/AREA URNS AUTO: ABNORMAL /HPF

## 2018-08-20 PROCEDURE — 87086 URINE CULTURE/COLONY COUNT: CPT

## 2018-08-20 PROCEDURE — 81001 URINALYSIS AUTO W/SCOPE: CPT

## 2018-08-20 PROCEDURE — 36415 COLL VENOUS BLD VENIPUNCTURE: CPT

## 2018-08-20 PROCEDURE — 80081 OBSTETRIC PANEL INC HIV TSTG: CPT

## 2018-08-21 LAB — HBV SURFACE AG SER QL: NORMAL

## 2018-08-22 LAB — BACTERIA UR CULT: NORMAL

## 2018-08-23 LAB — HIV 1+2 AB+HIV1 P24 AG SERPL QL IA: NORMAL

## 2018-08-27 ENCOUNTER — ROUTINE PRENATAL (OUTPATIENT)
Dept: OBGYN CLINIC | Facility: CLINIC | Age: 18
End: 2018-08-27

## 2018-08-27 VITALS — DIASTOLIC BLOOD PRESSURE: 80 MMHG | BODY MASS INDEX: 39.87 KG/M2 | WEIGHT: 218 LBS | SYSTOLIC BLOOD PRESSURE: 140 MMHG

## 2018-08-27 DIAGNOSIS — O10.919 CHRONIC HYPERTENSION AFFECTING PREGNANCY: ICD-10-CM

## 2018-08-27 DIAGNOSIS — Z34.02 ENCOUNTER FOR SUPERVISION OF NORMAL FIRST PREGNANCY IN SECOND TRIMESTER: Primary | ICD-10-CM

## 2018-08-27 PROCEDURE — PNV: Performed by: NURSE PRACTITIONER

## 2018-08-27 RX ORDER — ASPIRIN 81 MG/1
81 TABLET ORAL 2 TIMES DAILY
COMMUNITY
End: 2019-11-11

## 2018-08-27 NOTE — PROGRESS NOTES
Patient is doing well  Denies LOF/Bleeding/Cramping  She is not feeling movement yet  She has been experiencing more frequent HA about 3-4 a week  Has been taking Tylenol  I explained if needed she can take Excedrin  MFM report 8/9/18 at 12 2/7 weeks for genetic screening, chronic HTN, VERITO's syndrome and obesity  Nuchal translucency was normal  Nasal bone appear to be present  Size equals dates  NST starting at 32 weeks as well serial growth scans  Part one of the sequential screening was completed  Patient has been taking 81 mg aspirin daily  Advised to increase to twice daily per JSW  Baseline pre-eclamptic labs have been ordered  She is scheduled for her level 2 US next month  We will call her with labs results  RTO in 4 weeks  Laurence was seen today for routine prenatal visit      Diagnoses and all orders for this visit:    Encounter for supervision of normal first pregnancy in second trimester    Chronic hypertension affecting pregnancy  -     CBC and differential  -     Comprehensive metabolic panel  -     Creatinine Clearance 24 Hr  -     Uric acid  -     Protein, urine, 24 hour

## 2018-08-28 ENCOUNTER — OFFICE VISIT (OUTPATIENT)
Dept: NEPHROLOGY | Facility: CLINIC | Age: 18
End: 2018-08-28
Payer: COMMERCIAL

## 2018-08-28 ENCOUNTER — TELEPHONE (OUTPATIENT)
Dept: NEPHROLOGY | Facility: CLINIC | Age: 18
End: 2018-08-28

## 2018-08-28 VITALS
BODY MASS INDEX: 38.34 KG/M2 | HEIGHT: 63 IN | SYSTOLIC BLOOD PRESSURE: 134 MMHG | DIASTOLIC BLOOD PRESSURE: 76 MMHG | WEIGHT: 216.4 LBS

## 2018-08-28 DIAGNOSIS — N25.89 PSEUDOHYPOALDOSTERONISM, TYPE 2: Primary | ICD-10-CM

## 2018-08-28 DIAGNOSIS — Z3A.12 12 WEEKS GESTATION OF PREGNANCY: ICD-10-CM

## 2018-08-28 PROCEDURE — 99213 OFFICE O/P EST LOW 20 MIN: CPT | Performed by: PEDIATRICS

## 2018-08-28 NOTE — PROGRESS NOTES
Pediatric Nephrology Follow Up   Name:Samantha Jarvis    LOV:[id number]    Date:8/28/2018        Assessment/Plan   Assessment:  18 year  Plan:  Diagnoses and all orders for this visit:    Pseudohypoaldosteronism, type 2  -     Blood Pressure Monitoring (BLOOD PRESSURE KIT) POLLY; 1 kit by Does not apply route as needed (for blood pressure checks)    12 weeks gestation of pregnancy      Patient Instructions   1  Darnell's syndrome: Blood pressures elevated above baseline but relatively stable  Will try to work on obtaining a blood pressure machine to check blood pressures  Continue to use HCTZ on current dose and will follow up results of CMP to ensure that electrolytes are stable  Follow up in 3 months  HPI: Buddy Portillo is a 19 y  o female who presents for follow up of   Chief Complaint   Patient presents with    Follow-up     Laurence states that she has been feeling pretty good now that she is in her second trimester of pregnancy  Less nausea noted at this time  Continues to have headaches  Tried checking her blood pressure with her mother's older BP machine during one of the HAs and noted BPs of 551 and 916 systolic (family believes that machine is usually off by 10 mmHg per [place])  Laurence states that she averages about 3 headaches per week  Uses Tylenol without relief  Has had some weight loss at the beginning of the pregnancy  Normal energy level  Taking medications as prescribed  Has not yet had her HCTZ this am   Plans to go to [place] this semester  Review of Systems  Constitutional:   Negative for fevers  HEENT: negative for vision or hearing changes, rhinorrhea, congestion or sore throat  Respiratory: negative for cough or shortness of breath? ?  Gastrointestinal: negative for abdominal pain, nausea, vomiting  Genitourinary: +history of vaginal bleeding that has since resolved  Endocrine: positive for changes in weight  Neurologic: +headaches  Hematologic: negative for bruising or bleeding  Integumentary: negative for rashes  Psychiatric/Behavioral: no behavioral changes    The remainder of review of systems as noted per HPI  ? Past Medical History:   Diagnosis Date    Asthma     prn - not used recently    Contraception     Elevated glycated hemoglobin     History of asthma     History of UTI     3 times/yr - last 4/2018    Injury of foot, right     Injury of knee, right     Injury, ankle     Late menses     Migraine     1-2 times/month    Obesity     Pain in finger of right hand     Pain in right wrist     Pseudohypoaldosteronism, type 2     Severe headache     Sprain and strain of deltoid (ligament) of ankle      Past Surgical History:   Procedure Laterality Date    TOOTH EXTRACTION        Family History   Problem Relation Age of Onset    Hypertension Mother     Nephrolithiasis Mother     No Known Problems Father     Hypertension Maternal Grandfather     Heart disease Maternal Grandfather     Hypertension Paternal Grandmother     Hypertension Paternal Grandfather     Heart disease Paternal Grandfather     Diabetes Maternal Uncle     Breast cancer Neg Hx         paternal cousin      Social History     Social History    Marital status: Single     Spouse name: N/A    Number of children: N/A    Years of education: N/A     Occupational History    Not on file       Social History Main Topics    Smoking status: Never Smoker    Smokeless tobacco: Never Used    Alcohol use No    Drug use: No    Sexual activity: Yes     Partners: Male     Other Topics Concern    Not on file     Social History Narrative    No narrative on file       Allergies   Allergen Reactions    Nitrofurantoin Hives     Reaction Date: 02Oct2017;     Pollen Extract Allergic Rhinitis        Current Outpatient Prescriptions:     aspirin (ECOTRIN LOW STRENGTH) 81 mg EC tablet, Take 81 mg by mouth daily, Disp: , Rfl:    hydrochlorothiazide (HYDRODIURIL) 25 mg tablet, Take 1 tablet (25 mg total) by mouth daily, Disp: 90 tablet, Rfl: 1    ondansetron (ZOFRAN-ODT) 8 mg disintegrating tablet, as needed, Disp: , Rfl: 1    Prenatal MV-Min-Fe Fum-FA-DHA (PRENATAL+DHA PO), Take 1 tablet by mouth daily, Disp: , Rfl:     Blood Pressure Monitoring (BLOOD PRESSURE KIT) POLLY, 1 kit by Does not apply route as needed (for blood pressure checks), Disp: 1 Device, Rfl: 0     Objective   Vitals:    08/28/18 0929   BP: 134/76     Height:5' 2 87" (1 597 m)  Weight:98 2 kg (216 lb 6 4 oz)  BMI: Body mass index is 38 49 kg/m²      Physical Exam:  General: Obese  Awake, alert and in no acute distress  HEENT:  +glasses  Normocephalic, atraumatic, pupils equally round and reactive to light, extraocular movement intact, conjunctiva clear with no discharge  Ears normally set with tympanic membranes visualized  Tympanic membranes without erythema or effusion and canals clear  Nares patent with no discharge  Mucous membranes moist and oropharynx is clear with no erythema or exudate present  Normal dentition  Chest: Normal without deformity  Neck: supple, symmetric with no masses, no cervical lymphadenopathy  Lungs: clear to auscultation bilaterally with no wheezes, rales or rhonchi  Cardiovascular:   Normal S1 and S2  No murmurs, rubs or gallops  Regular rate and rhythm  Abdomen:  Soft, nontender, and nondistended  Normoactive bowel sounds  No hepatosplenomegaly present  Genitourinary:  Deferred  Back:  Straight without deformity  Skin: warm and well perfused  No rashes present  Extremities:  No cyanosis, clubbing or edema  Pulses 2+ bilaterally  Musculoskeletal:   Full range of motion all four extremities  No joint swelling or tenderness noted  Neurologic: grossly normal neurologic exam with no deficits noted    Psychiatric: normal mood and affect     Lab Results:   Lab Results   Component Value Date    WBC 7 66 08/20/2018    HGB 12 5 08/20/2018    HCT 40 4 08/20/2018    MCV 88 08/20/2018     08/20/2018     Lab Results   Component Value Date    GLUCOSE 87 11/01/2015    CALCIUM 8 6 07/16/2018     07/16/2018    K 4 6 07/16/2018    CO2 22 07/16/2018     (H) 07/16/2018    BUN 7 07/16/2018    CREATININE 0 60 07/16/2018     Lab Results   Component Value Date    CALCIUM 8 6 07/16/2018    PHOS 3 9 10/21/2014           All laboratory results and imaging was reviewed by me and summarized above

## 2018-08-28 NOTE — TELEPHONE ENCOUNTER
I spoke with Laurence about her blood pressure cuff per Dr Jara Certain  Her pharmacy informed me that it is fully covered and should be sent to [facility] [address]  Laurence is aware and told me that she will take her script over to the pharmacy  I let her know that she should call the office back if there are any issues or concerns

## 2018-08-28 NOTE — PATIENT INSTRUCTIONS
1  Darnell's syndrome: Blood pressures elevated above baseline but relatively stable  Will try to work on obtaining a blood pressure machine to check blood pressures  Continue to use HCTZ on current dose and will follow up results of CMP to ensure that electrolytes are stable  Follow up in 3 months

## 2018-09-07 ENCOUNTER — TRANSCRIBE ORDERS (OUTPATIENT)
Dept: LAB | Age: 18
End: 2018-09-07

## 2018-09-07 ENCOUNTER — APPOINTMENT (OUTPATIENT)
Dept: LAB | Age: 18
End: 2018-09-07
Payer: COMMERCIAL

## 2018-09-07 LAB
ALBUMIN SERPL BCP-MCNC: 2.9 G/DL (ref 3.5–5)
ALP SERPL-CCNC: 75 U/L (ref 46–116)
ALT SERPL W P-5'-P-CCNC: 19 U/L (ref 12–78)
ANION GAP SERPL CALCULATED.3IONS-SCNC: 8 MMOL/L (ref 4–13)
AST SERPL W P-5'-P-CCNC: 12 U/L (ref 5–45)
BASOPHILS # BLD AUTO: 0.02 THOUSANDS/ΜL (ref 0–0.1)
BASOPHILS NFR BLD AUTO: 0 % (ref 0–1)
BILIRUB SERPL-MCNC: 0.13 MG/DL (ref 0.2–1)
BUN SERPL-MCNC: 7 MG/DL (ref 5–25)
CALCIUM SERPL-MCNC: 9.1 MG/DL (ref 8.3–10.1)
CHLORIDE SERPL-SCNC: 108 MMOL/L (ref 100–108)
CO2 SERPL-SCNC: 20 MMOL/L (ref 21–32)
CREAT SERPL-MCNC: 0.52 MG/DL (ref 0.6–1.3)
EOSINOPHIL # BLD AUTO: 0.05 THOUSAND/ΜL (ref 0–0.61)
EOSINOPHIL NFR BLD AUTO: 1 % (ref 0–6)
ERYTHROCYTE [DISTWIDTH] IN BLOOD BY AUTOMATED COUNT: 13.8 % (ref 11.6–15.1)
GFR SERPL CREATININE-BSD FRML MDRD: 138 ML/MIN/1.73SQ M
GLUCOSE SERPL-MCNC: 75 MG/DL (ref 65–140)
HCT VFR BLD AUTO: 39.5 % (ref 34.8–46.1)
HGB BLD-MCNC: 12.1 G/DL (ref 11.5–15.4)
IMM GRANULOCYTES # BLD AUTO: 0.02 THOUSAND/UL (ref 0–0.2)
IMM GRANULOCYTES NFR BLD AUTO: 0 % (ref 0–2)
LYMPHOCYTES # BLD AUTO: 1.93 THOUSANDS/ΜL (ref 0.6–4.47)
LYMPHOCYTES NFR BLD AUTO: 23 % (ref 14–44)
MCH RBC QN AUTO: 27.4 PG (ref 26.8–34.3)
MCHC RBC AUTO-ENTMCNC: 30.6 G/DL (ref 31.4–37.4)
MCV RBC AUTO: 89 FL (ref 82–98)
MONOCYTES # BLD AUTO: 0.43 THOUSAND/ΜL (ref 0.17–1.22)
MONOCYTES NFR BLD AUTO: 5 % (ref 4–12)
NEUTROPHILS # BLD AUTO: 6.04 THOUSANDS/ΜL (ref 1.85–7.62)
NEUTS SEG NFR BLD AUTO: 71 % (ref 43–75)
NRBC BLD AUTO-RTO: 0 /100 WBCS
PLATELET # BLD AUTO: 205 THOUSANDS/UL (ref 149–390)
PMV BLD AUTO: 10.8 FL (ref 8.9–12.7)
POTASSIUM SERPL-SCNC: 4.6 MMOL/L (ref 3.5–5.3)
PROT SERPL-MCNC: 6.6 G/DL (ref 6.4–8.2)
RBC # BLD AUTO: 4.42 MILLION/UL (ref 3.81–5.12)
SODIUM SERPL-SCNC: 136 MMOL/L (ref 136–145)
URATE SERPL-MCNC: 3 MG/DL (ref 2–6.8)
WBC # BLD AUTO: 8.49 THOUSAND/UL (ref 4.31–10.16)

## 2018-09-07 PROCEDURE — 84550 ASSAY OF BLOOD/URIC ACID: CPT | Performed by: NURSE PRACTITIONER

## 2018-09-07 PROCEDURE — 85025 COMPLETE CBC W/AUTO DIFF WBC: CPT | Performed by: NURSE PRACTITIONER

## 2018-09-07 PROCEDURE — 80053 COMPREHEN METABOLIC PANEL: CPT | Performed by: NURSE PRACTITIONER

## 2018-09-07 PROCEDURE — 36415 COLL VENOUS BLD VENIPUNCTURE: CPT | Performed by: NURSE PRACTITIONER

## 2018-09-19 ENCOUNTER — TELEPHONE (OUTPATIENT)
Dept: OBGYN CLINIC | Facility: CLINIC | Age: 18
End: 2018-09-19

## 2018-09-19 NOTE — TELEPHONE ENCOUNTER
Pt had some lightheadedness & blurred vision this am then subsided, some lower abd generalized discomfort this aft but not x past 3 hrs  recom increase hydration, recall prn or in am to update

## 2018-09-24 ENCOUNTER — ROUTINE PRENATAL (OUTPATIENT)
Dept: OBGYN CLINIC | Facility: CLINIC | Age: 18
End: 2018-09-24

## 2018-09-24 VITALS — WEIGHT: 214 LBS | BODY MASS INDEX: 38.06 KG/M2 | DIASTOLIC BLOOD PRESSURE: 80 MMHG | SYSTOLIC BLOOD PRESSURE: 132 MMHG

## 2018-09-24 DIAGNOSIS — Z34.02 ENCOUNTER FOR SUPERVISION OF NORMAL FIRST PREGNANCY IN SECOND TRIMESTER: Primary | ICD-10-CM

## 2018-09-24 DIAGNOSIS — O99.211 OBESITY AFFECTING PREGNANCY IN FIRST TRIMESTER: ICD-10-CM

## 2018-09-24 PROCEDURE — PNV: Performed by: NURSE PRACTITIONER

## 2018-09-24 NOTE — PROGRESS NOTES
Patient is doing well  Denies LOF/Bleeding/Cramping  She has started to feel movement now  Blood pressure is stable  She is scheduled for her level 2 Oct 4th  Repeat Hemoglobin A1C ordered  RTO in 4 weeks

## 2018-10-04 ENCOUNTER — ROUTINE PRENATAL (OUTPATIENT)
Dept: PERINATAL CARE | Facility: CLINIC | Age: 18
End: 2018-10-04
Payer: COMMERCIAL

## 2018-10-04 VITALS
WEIGHT: 217.6 LBS | HEIGHT: 62 IN | SYSTOLIC BLOOD PRESSURE: 131 MMHG | BODY MASS INDEX: 40.04 KG/M2 | DIASTOLIC BLOOD PRESSURE: 84 MMHG | HEART RATE: 99 BPM

## 2018-10-04 DIAGNOSIS — O10.912 MATERNAL CHRONIC HYPERTENSION, SECOND TRIMESTER: Primary | ICD-10-CM

## 2018-10-04 DIAGNOSIS — Z3A.20 20 WEEKS GESTATION OF PREGNANCY: ICD-10-CM

## 2018-10-04 DIAGNOSIS — O99.212 OBESITY AFFECTING PREGNANCY IN SECOND TRIMESTER: ICD-10-CM

## 2018-10-04 DIAGNOSIS — Z36.86 ENCOUNTER FOR ANTENATAL SCREENING FOR CERVICAL LENGTH: ICD-10-CM

## 2018-10-04 PROCEDURE — 76811 OB US DETAILED SNGL FETUS: CPT | Performed by: OBSTETRICS & GYNECOLOGY

## 2018-10-04 PROCEDURE — 76817 TRANSVAGINAL US OBSTETRIC: CPT | Performed by: OBSTETRICS & GYNECOLOGY

## 2018-10-04 PROCEDURE — 99212 OFFICE O/P EST SF 10 MIN: CPT | Performed by: OBSTETRICS & GYNECOLOGY

## 2018-10-04 NOTE — LETTER
October 5, 2018     Minerva Aase, MD  [address]  [address]  [address] [place] [address]    Patient: Deborah Barker   YOB: 2000   Date of Visit: 10/4/2018       Dear Dr Milo Nicole: Thank you for referring Aleks Franco to me for evaluation  Below are my notes for this consultation  If you have questions, please do not hesitate to call me  I look forward to following your patient along with you  Sincerely,        Ld Herring MD        CC: No Recipients  Ld Herring MD  10/4/2018  4:35 PM  Sign at close encounter  Please refer to the [facility] ultrasound report in Ob Procedures for additional information regarding the visit to the [facility], [facility]  today

## 2018-10-04 NOTE — PROGRESS NOTES
Please refer to the [facility] ultrasound report in Ob Procedures for additional information regarding the visit to the [facility], [facility]  today

## 2018-10-04 NOTE — PROGRESS NOTES
A transvaginal ultrasound was performed  Sonographer note on use of High Level Disinfection Process (Trophon) for transvaginal probe# 1 used, serial [id number]    [address] [place]

## 2018-10-06 ENCOUNTER — APPOINTMENT (OUTPATIENT)
Dept: LAB | Age: 18
End: 2018-10-06
Payer: COMMERCIAL

## 2018-10-06 DIAGNOSIS — O99.211 OBESITY AFFECTING PREGNANCY IN FIRST TRIMESTER: ICD-10-CM

## 2018-10-06 LAB
EST. AVERAGE GLUCOSE BLD GHB EST-MCNC: 105 MG/DL
HBA1C MFR BLD: 5.3 % (ref 4.2–6.3)

## 2018-10-06 PROCEDURE — 83036 HEMOGLOBIN GLYCOSYLATED A1C: CPT

## 2018-10-06 PROCEDURE — 36415 COLL VENOUS BLD VENIPUNCTURE: CPT

## 2018-10-14 ENCOUNTER — APPOINTMENT (OUTPATIENT)
Dept: LAB | Age: 18
End: 2018-10-14
Payer: COMMERCIAL

## 2018-10-14 ENCOUNTER — TRANSCRIBE ORDERS (OUTPATIENT)
Dept: ADMINISTRATIVE | Age: 18
End: 2018-10-14

## 2018-10-14 DIAGNOSIS — O10.919 CHRONIC HYPERTENSION AFFECTING PREGNANCY: ICD-10-CM

## 2018-10-14 DIAGNOSIS — O10.919 CHRONIC HYPERTENSION AFFECTING PREGNANCY: Primary | ICD-10-CM

## 2018-10-14 LAB
CREAT 24H UR-MRATE: 1.4 G/24HR (ref 0.6–1.8)
CREAT CL 24H UR+SERPL-VRATE: 147.49 ML/MIN (ref 75–115)
CREAT SERPL-MCNC: 0.57 MG/DL (ref 0.6–1.3)
CREAT UR-MCNC: 99.9 MG/DL
PROT 24H UR-MCNC: 116 MG/24 HRS (ref 40–150)
SPECIMEN VOL UR: 1450 ML
SPECIMEN VOL UR: 1450 ML

## 2018-10-14 PROCEDURE — 36415 COLL VENOUS BLD VENIPUNCTURE: CPT | Performed by: NURSE PRACTITIONER

## 2018-10-14 PROCEDURE — 84156 ASSAY OF PROTEIN URINE: CPT | Performed by: NURSE PRACTITIONER

## 2018-10-14 PROCEDURE — 82565 ASSAY OF CREATININE: CPT | Performed by: NURSE PRACTITIONER

## 2018-10-14 PROCEDURE — 82575 CREATININE CLEARANCE TEST: CPT | Performed by: NURSE PRACTITIONER

## 2018-10-22 ENCOUNTER — ROUTINE PRENATAL (OUTPATIENT)
Dept: OBGYN CLINIC | Facility: CLINIC | Age: 18
End: 2018-10-22

## 2018-10-22 VITALS — DIASTOLIC BLOOD PRESSURE: 78 MMHG | BODY MASS INDEX: 39.87 KG/M2 | SYSTOLIC BLOOD PRESSURE: 128 MMHG | WEIGHT: 218 LBS

## 2018-10-22 DIAGNOSIS — N25.89 PSEUDOHYPOALDOSTERONISM, TYPE 2: Primary | ICD-10-CM

## 2018-10-22 DIAGNOSIS — Z34.02 ENCOUNTER FOR SUPERVISION OF NORMAL FIRST PREGNANCY IN SECOND TRIMESTER: ICD-10-CM

## 2018-10-22 PROCEDURE — PNV: Performed by: OBSTETRICS & GYNECOLOGY

## 2018-10-22 RX ORDER — AMOXICILLIN 500 MG/1
TABLET, FILM COATED ORAL
Refills: 1 | Status: ON HOLD | COMMUNITY
Start: 2018-10-17 | End: 2018-11-27 | Stop reason: ALTCHOICE

## 2018-10-22 NOTE — PROGRESS NOTES
Patient feeling well  +FM  No VB  Has Nephrologist appointment next month followed by  follow up sono in December  Order for 28 week labs + preeclampsia labs given  Normal BP today without proteinuria

## 2018-11-06 ENCOUNTER — APPOINTMENT (OUTPATIENT)
Dept: LAB | Facility: HOSPITAL | Age: 18
End: 2018-11-06
Attending: OBSTETRICS & GYNECOLOGY
Payer: COMMERCIAL

## 2018-11-06 LAB
ALBUMIN SERPL BCP-MCNC: 2.8 G/DL (ref 3.5–5)
ALP SERPL-CCNC: 88 U/L (ref 46–116)
ALT SERPL W P-5'-P-CCNC: 20 U/L (ref 12–78)
ANION GAP SERPL CALCULATED.3IONS-SCNC: 9 MMOL/L (ref 4–13)
AST SERPL W P-5'-P-CCNC: 22 U/L (ref 5–45)
BASOPHILS # BLD AUTO: 0.01 THOUSANDS/ΜL (ref 0–0.1)
BASOPHILS NFR BLD AUTO: 0 % (ref 0–1)
BILIRUB SERPL-MCNC: 0.4 MG/DL (ref 0.2–1)
BUN SERPL-MCNC: 8 MG/DL (ref 5–25)
CALCIUM SERPL-MCNC: 9.1 MG/DL (ref 8.3–10.1)
CHLORIDE SERPL-SCNC: 101 MMOL/L (ref 100–108)
CO2 SERPL-SCNC: 24 MMOL/L (ref 21–32)
CREAT SERPL-MCNC: 0.62 MG/DL (ref 0.6–1.3)
EOSINOPHIL # BLD AUTO: 0.02 THOUSAND/ΜL (ref 0–0.61)
EOSINOPHIL NFR BLD AUTO: 0 % (ref 0–6)
ERYTHROCYTE [DISTWIDTH] IN BLOOD BY AUTOMATED COUNT: 13.9 % (ref 11.6–15.1)
GFR SERPL CREATININE-BSD FRML MDRD: 130 ML/MIN/1.73SQ M
GLUCOSE 1H P 50 G GLC PO SERPL-MCNC: 80 MG/DL
GLUCOSE P FAST SERPL-MCNC: 79 MG/DL (ref 65–99)
HCT VFR BLD AUTO: 36.8 % (ref 34.8–46.1)
HGB BLD-MCNC: 11.6 G/DL (ref 11.5–15.4)
IMM GRANULOCYTES # BLD AUTO: 0.06 THOUSAND/UL (ref 0–0.2)
IMM GRANULOCYTES NFR BLD AUTO: 1 % (ref 0–2)
LYMPHOCYTES # BLD AUTO: 1.64 THOUSANDS/ΜL (ref 0.6–4.47)
LYMPHOCYTES NFR BLD AUTO: 18 % (ref 14–44)
MCH RBC QN AUTO: 28.4 PG (ref 26.8–34.3)
MCHC RBC AUTO-ENTMCNC: 31.5 G/DL (ref 31.4–37.4)
MCV RBC AUTO: 90 FL (ref 82–98)
MONOCYTES # BLD AUTO: 0.46 THOUSAND/ΜL (ref 0.17–1.22)
MONOCYTES NFR BLD AUTO: 5 % (ref 4–12)
NEUTROPHILS # BLD AUTO: 6.98 THOUSANDS/ΜL (ref 1.85–7.62)
NEUTS SEG NFR BLD AUTO: 76 % (ref 43–75)
NRBC BLD AUTO-RTO: 0 /100 WBCS
PLATELET # BLD AUTO: 221 THOUSANDS/UL (ref 149–390)
PMV BLD AUTO: 10.4 FL (ref 8.9–12.7)
POTASSIUM SERPL-SCNC: 3.2 MMOL/L (ref 3.5–5.3)
PROT SERPL-MCNC: 6.8 G/DL (ref 6.4–8.2)
RBC # BLD AUTO: 4.08 MILLION/UL (ref 3.81–5.12)
SODIUM SERPL-SCNC: 134 MMOL/L (ref 136–145)
URATE SERPL-MCNC: 4 MG/DL (ref 2–6.8)
WBC # BLD AUTO: 9.17 THOUSAND/UL (ref 4.31–10.16)

## 2018-11-06 PROCEDURE — 86592 SYPHILIS TEST NON-TREP QUAL: CPT | Performed by: OBSTETRICS & GYNECOLOGY

## 2018-11-06 PROCEDURE — 85025 COMPLETE CBC W/AUTO DIFF WBC: CPT | Performed by: OBSTETRICS & GYNECOLOGY

## 2018-11-06 PROCEDURE — 84550 ASSAY OF BLOOD/URIC ACID: CPT | Performed by: OBSTETRICS & GYNECOLOGY

## 2018-11-06 PROCEDURE — 36415 COLL VENOUS BLD VENIPUNCTURE: CPT | Performed by: OBSTETRICS & GYNECOLOGY

## 2018-11-06 PROCEDURE — 80053 COMPREHEN METABOLIC PANEL: CPT | Performed by: OBSTETRICS & GYNECOLOGY

## 2018-11-06 PROCEDURE — 82950 GLUCOSE TEST: CPT | Performed by: OBSTETRICS & GYNECOLOGY

## 2018-11-07 ENCOUNTER — TELEPHONE (OUTPATIENT)
Dept: OBGYN CLINIC | Facility: CLINIC | Age: 18
End: 2018-11-07

## 2018-11-07 LAB — RPR SER QL: NORMAL

## 2018-11-07 NOTE — TELEPHONE ENCOUNTER
----- Message from Hafsa Porras MD sent at 11/6/2018 11:51 AM EST -----  Notify all wnl  Still waiting for RPR

## 2018-11-09 DIAGNOSIS — N25.89 PSEUDOHYPOALDOSTERONISM, TYPE 2: ICD-10-CM

## 2018-11-09 RX ORDER — HYDROCHLOROTHIAZIDE 25 MG/1
25 TABLET ORAL DAILY
Qty: 90 TABLET | Refills: 1 | Status: ON HOLD | OUTPATIENT
Start: 2018-11-09 | End: 2018-11-27 | Stop reason: ALTCHOICE

## 2018-11-19 ENCOUNTER — OFFICE VISIT (OUTPATIENT)
Dept: NEPHROLOGY | Facility: CLINIC | Age: 18
End: 2018-11-19
Payer: COMMERCIAL

## 2018-11-19 ENCOUNTER — ROUTINE PRENATAL (OUTPATIENT)
Dept: OBGYN CLINIC | Facility: CLINIC | Age: 18
End: 2018-11-19

## 2018-11-19 VITALS
WEIGHT: 222 LBS | DIASTOLIC BLOOD PRESSURE: 78 MMHG | BODY MASS INDEX: 40.85 KG/M2 | HEIGHT: 62 IN | SYSTOLIC BLOOD PRESSURE: 120 MMHG

## 2018-11-19 VITALS — BODY MASS INDEX: 40.24 KG/M2 | DIASTOLIC BLOOD PRESSURE: 78 MMHG | WEIGHT: 220 LBS | SYSTOLIC BLOOD PRESSURE: 122 MMHG

## 2018-11-19 DIAGNOSIS — N25.89 PSEUDOHYPOALDOSTERONISM, TYPE 2: ICD-10-CM

## 2018-11-19 DIAGNOSIS — N25.89 PSEUDOHYPOALDOSTERONISM, TYPE 2: Primary | ICD-10-CM

## 2018-11-19 DIAGNOSIS — Z34.02 ENCOUNTER FOR SUPERVISION OF NORMAL FIRST PREGNANCY IN SECOND TRIMESTER: Primary | ICD-10-CM

## 2018-11-19 PROCEDURE — 99213 OFFICE O/P EST LOW 20 MIN: CPT | Performed by: PEDIATRICS

## 2018-11-19 PROCEDURE — PNV: Performed by: OBSTETRICS & GYNECOLOGY

## 2018-11-19 NOTE — PROGRESS NOTES
Pediatric Nephrology Follow Up   Name:Samantha Salas    BLL:[id number]    Date:11/19/2018        Assessment/Plan   Assessment:  19year old female with Pseudohypoaldosteronism type 2  Plan:  Diagnoses and all orders for this visit:    Pseudohypoaldosteronism, type 2      Patient Instructions   1  Darnell's syndrome: Blood pressures remain relatively stable  Would like for [facility] to check her blood pressure and continue to monitor trend  If consistently running below 120s, will plan to decrease dose of HCTZ to 12 5 mg and repeat BMP 1 week afterwards to ensure potassium is stable  Follow up in 3 months  HPI: Myrtle De La O is a 19 y  o female who presents for follow up of   Chief Complaint   Patient presents with    Follow-up     Myrtle De La O is accompanied by Her mother who assists in providing the history today  Jakeia states that recently she has had a few episodes where she has felt "off"  Feels hot, dizzy and noted blood pressure to be 110 prior to needing to take her medication  Has not had the episodes frequently and last one was 2 weeks ago  No loss of consciousness  Checks BPs at home intermittently currently and mainly 120s/70s  No recent fevers or illness except for sinus infection about 1 month ago  Has some lingering congestion per [place]  States that her anxiety continues to remain present and is not currently seeing anyone  Working and attending college  Review of Systems  Constitutional:   Negative for fevers, fatigue   HEENT: negative for rhinorrhea, sore throat  Respiratory: negative for cough  + shortness of breath with exertion? Cardiovascular: negative for facial or lower extremity edema   + chest pain with exertion  Gastrointestinal: occasional emesis   Musculoskeletal: negative for joint pain or swelling  Neurologic: negative for headache  Psychiatric/Behavioral: as noted above    The remainder of review of systems as noted per HPI ?  Past Medical History:   Diagnosis Date    Asthma     prn - not used recently    Contraception     Elevated glycated hemoglobin     History of asthma     History of UTI     3 times/yr - last 4/2018    Injury of foot, right     Injury of knee, right     Injury, ankle     Late menses     Migraine     1-2 times/month    Obesity     Pain in finger of right hand     Pain in right wrist     Pseudohypoaldosteronism, type 2     Severe headache     Sprain and strain of deltoid (ligament) of ankle      Past Surgical History:   Procedure Laterality Date    TOOTH EXTRACTION        Family History   Problem Relation Age of Onset    Hypertension Mother     Nephrolithiasis Mother     No Known Problems Father     Hypertension Maternal Grandfather     Heart disease Maternal Grandfather     Hypertension Paternal Grandmother     Hypertension Paternal Grandfather     Heart disease Paternal Grandfather     Diabetes Maternal Uncle     Breast cancer Neg Hx         paternal cousin      Social History     Social History    Marital status: Single     Spouse name: N/A    Number of children: N/A    Years of education: N/A     Occupational History    Not on file       Social History Main Topics    Smoking status: Never Smoker    Smokeless tobacco: Never Used    Alcohol use No    Drug use: No    Sexual activity: Yes     Partners: Male     Other Topics Concern    Not on file     Social History Narrative    No narrative on file       Allergies   Allergen Reactions    Nitrofurantoin Hives     Reaction Date: 02Oct2017;     Pollen Extract Allergic Rhinitis        Current Outpatient Prescriptions:     aspirin (ECOTRIN LOW STRENGTH) 81 mg EC tablet, Take 81 mg by mouth 2 (two) times a day  , Disp: , Rfl:     Blood Pressure Monitoring (BLOOD PRESSURE KIT) Talha MATIAS kit by Does not apply route as needed (for blood pressure checks), Disp: 1 Device, Rfl: 0    hydrochlorothiazide (HYDRODIURIL) 25 mg tablet, Take 1 tablet (25 mg total) by mouth daily, Disp: 90 tablet, Rfl: 1    ondansetron (ZOFRAN-ODT) 8 mg disintegrating tablet, as needed, Disp: , Rfl: 1    Prenatal MV-Min-Fe Fum-FA-DHA (PRENATAL+DHA PO), Take 1 tablet by mouth daily, Disp: , Rfl:     amoxicillin (AMOXIL) 500 MG tablet, TAKE 1 TAB BY MOUTH 3 TIMES DAILY, Disp: , Rfl: 1     Objective   Vitals:    11/19/18 1048   BP: 120/78     Height:5' 2" (1 575 m)  Weight:101 kg (222 lb)  BMI: Body mass index is 40 6 kg/m²      Physical Exam:  General: Obese, pregnant female  Awake, alert and in no acute distress  HEENT:  +Glasses  Normocephalic, atraumatic, pupils equally round and reactive to light, extraocular movement intact, conjunctiva clear with no discharge  Ears normally set with tympanic membranes visualized  Tympanic membranes without erythema or effusion and canals clear  Nares patent with no discharge  Mucous membranes moist and oropharynx is clear with no erythema or exudate present  Normal dentition  Chest: Normal without deformity  Neck: supple, symmetric with no masses, no cervical lymphadenopathy  Lungs: clear to auscultation bilaterally with no wheezes, rales or rhonchi  Cardiovascular:   Normal S1 and S2  No murmurs, rubs or gallops  Regular rate and rhythm  Abdomen:  Soft, nontender, and nondistended  Normoactive bowel sounds  Back:  Straight without deformity  Skin: warm and well perfused  No rashes present  Extremities:  No cyanosis, clubbing or edema  Pulses 2+ bilaterally  Musculoskeletal:   Full range of motion all four extremities  No joint swelling or tenderness noted  Neurologic: grossly normal neurologic exam with no deficits noted    Psychiatric: normal mood and affect     Lab Results:   Lab Results   Component Value Date    WBC 9 17 11/06/2018    HGB 11 6 11/06/2018    HCT 36 8 11/06/2018    MCV 90 11/06/2018     11/06/2018     Lab Results   Component Value Date    GLUCOSE 87 11/01/2015    CALCIUM 9 1 11/06/2018     11/01/2015    K 3 2 (L) 11/06/2018    CO2 24 11/06/2018     11/06/2018    BUN 8 11/06/2018    CREATININE 0 62 11/06/2018     Imaging: none   Other Studies: none    All laboratory results and imaging was reviewed by me and summarized above

## 2018-11-19 NOTE — PROGRESS NOTES
Good FM  No VB or LOF  Saw her Nephrologist this morning  Plan is to monitor BP x 1 week then 1/2 HCTZ dose if BP's low  Has next  visit set up for 12/3/18  Plan to do TDAP and flu shots next visit (very anxious re: shots - needs to "be prepared")  Discussed checking-in visit  Patient requests counselor to help her need with anxiety re: birth    Will refer to [facility]

## 2018-11-19 NOTE — PATIENT INSTRUCTIONS
1  Darnell's syndrome: Blood pressures remain relatively stable  Would like for [facility] to check her blood pressure and continue to monitor trend  If consistently running below 120s, will plan to decrease dose of HCTZ to 12 5 mg and repeat BMP 1 week afterwards to ensure potassium is stable  Follow up in 3 months

## 2018-11-19 NOTE — LETTER
November 19, 2018     [address] [id number]    Patient: Wilbert Phillip   YOB: 2000   Date of Visit: 11/19/2018       Dear Dr Veronica Rosa: Thank you for referring Leisa Thomson to me for evaluation  Below are my notes for this consultation  If you have questions, please do not hesitate to call me  I look forward to following your patient along with you  Sincerely,        Tristan Bradford MD        CC: No Recipients  Tristan Bradford MD  11/19/2018 12:15 PM  Sign at close encounter    Pediatric Nephrology Follow Up   Paulie Rivera    QAN:[id number]    Date:11/19/2018        Assessment/Plan   Assessment:  19year old female with Pseudohypoaldosteronism type 2  Plan:  Diagnoses and all orders for this visit:    Pseudohypoaldosteronism, type 2      Patient Instructions   1  Darnell's syndrome: Blood pressures remain relatively stable  Would like for [facility] to check her blood pressure and continue to monitor trend  If consistently running below 120s, will plan to decrease dose of HCTZ to 12 5 mg and repeat BMP 1 week afterwards to ensure potassium is stable  Follow up in 3 months  HPI: Wilbert Phillip is a 19 y  o female who presents for follow up of   Chief Complaint   Patient presents with    Follow-up     Wilbert Phillip is accompanied by Her mother who assists in providing the history today  Jakeia states that recently she has had a few episodes where she has felt "off"  Feels hot, dizzy and noted blood pressure to be 110 prior to needing to take her medication  Has not had the episodes frequently and last one was 2 weeks ago  No loss of consciousness  Checks BPs at home intermittently currently and mainly 120s/70s  No recent fevers or illness except for sinus infection about 1 month ago  Has some lingering congestion per [place]    States that her anxiety continues to remain present and is not currently seeing anyone  Working and attending college  Review of Systems  Constitutional:   Negative for fevers, fatigue   HEENT: negative for rhinorrhea, sore throat  Respiratory: negative for cough  + shortness of breath with exertion? Cardiovascular: negative for facial or lower extremity edema  + chest pain with exertion  Gastrointestinal: occasional emesis   Musculoskeletal: negative for joint pain or swelling  Neurologic: negative for headache  Psychiatric/Behavioral: as noted above    The remainder of review of systems as noted per HPI  ?  Past Medical History:   Diagnosis Date    Asthma     prn - not used recently    Contraception     Elevated glycated hemoglobin     History of asthma     History of UTI     3 times/yr - last 4/2018    Injury of foot, right     Injury of knee, right     Injury, ankle     Late menses     Migraine     1-2 times/month    Obesity     Pain in finger of right hand     Pain in right wrist     Pseudohypoaldosteronism, type 2     Severe headache     Sprain and strain of deltoid (ligament) of ankle      Past Surgical History:   Procedure Laterality Date    TOOTH EXTRACTION        Family History   Problem Relation Age of Onset    Hypertension Mother     Nephrolithiasis Mother     No Known Problems Father     Hypertension Maternal Grandfather     Heart disease Maternal Grandfather     Hypertension Paternal Grandmother     Hypertension Paternal Grandfather     Heart disease Paternal Grandfather     Diabetes Maternal Uncle     Breast cancer Neg Hx         paternal cousin      Social History     Social History    Marital status: Single     Spouse name: N/A    Number of children: N/A    Years of education: N/A     Occupational History    Not on file       Social History Main Topics    Smoking status: Never Smoker    Smokeless tobacco: Never Used    Alcohol use No    Drug use: No    Sexual activity: Yes     Partners: Male     Other Topics Concern    Not on file     Social History Narrative    No narrative on file       Allergies   Allergen Reactions    Nitrofurantoin Hives     Reaction Date: 02Oct2017;     Pollen Extract Allergic Rhinitis        Current Outpatient Prescriptions:     aspirin (ECOTRIN LOW STRENGTH) 81 mg EC tablet, Take 81 mg by mouth 2 (two) times a day  , Disp: , Rfl:     Blood Pressure Monitoring (BLOOD PRESSURE KIT) POLLY, 1 kit by Does not apply route as needed (for blood pressure checks), Disp: 1 Device, Rfl: 0    hydrochlorothiazide (HYDRODIURIL) 25 mg tablet, Take 1 tablet (25 mg total) by mouth daily, Disp: 90 tablet, Rfl: 1    ondansetron (ZOFRAN-ODT) 8 mg disintegrating tablet, as needed, Disp: , Rfl: 1    Prenatal MV-Min-Fe Fum-FA-DHA (PRENATAL+DHA PO), Take 1 tablet by mouth daily, Disp: , Rfl:     amoxicillin (AMOXIL) 500 MG tablet, TAKE 1 TAB BY MOUTH 3 TIMES DAILY, Disp: , Rfl: 1     Objective   Vitals:    11/19/18 1048   BP: 120/78     Height:5' 2" (1 575 m)  Weight:101 kg (222 lb)  BMI: Body mass index is 40 6 kg/m²      Physical Exam:  General: Obese, pregnant female  Awake, alert and in no acute distress  HEENT:  +Glasses  Normocephalic, atraumatic, pupils equally round and reactive to light, extraocular movement intact, conjunctiva clear with no discharge  Ears normally set with tympanic membranes visualized  Tympanic membranes without erythema or effusion and canals clear  Nares patent with no discharge  Mucous membranes moist and oropharynx is clear with no erythema or exudate present  Normal dentition  Chest: Normal without deformity  Neck: supple, symmetric with no masses, no cervical lymphadenopathy  Lungs: clear to auscultation bilaterally with no wheezes, rales or rhonchi  Cardiovascular:   Normal S1 and S2  No murmurs, rubs or gallops  Regular rate and rhythm  Abdomen:  Soft, nontender, and nondistended  Normoactive bowel sounds      Back:  Straight without deformity  Skin: warm and well perfused  No rashes present  Extremities:  No cyanosis, clubbing or edema  Pulses 2+ bilaterally  Musculoskeletal:   Full range of motion all four extremities  No joint swelling or tenderness noted  Neurologic: grossly normal neurologic exam with no deficits noted    Psychiatric: normal mood and affect     Lab Results:   Lab Results   Component Value Date    WBC 9 17 11/06/2018    HGB 11 6 11/06/2018    HCT 36 8 11/06/2018    MCV 90 11/06/2018     11/06/2018     Lab Results   Component Value Date    GLUCOSE 87 11/01/2015    CALCIUM 9 1 11/06/2018     11/01/2015    K 3 2 (L) 11/06/2018    CO2 24 11/06/2018     11/06/2018    BUN 8 11/06/2018    CREATININE 0 62 11/06/2018     Imaging: none   Other Studies: none    All laboratory results and imaging was reviewed by me and summarized above

## 2018-11-27 ENCOUNTER — TELEPHONE (OUTPATIENT)
Dept: NEPHROLOGY | Facility: CLINIC | Age: 18
End: 2018-11-27

## 2018-11-27 ENCOUNTER — HOSPITAL ENCOUNTER (OUTPATIENT)
Facility: HOSPITAL | Age: 18
Discharge: HOME | End: 2018-11-27
Attending: OBSTETRICS & GYNECOLOGY | Admitting: OBSTETRICS & GYNECOLOGY
Payer: COMMERCIAL

## 2018-11-27 VITALS
SYSTOLIC BLOOD PRESSURE: 116 MMHG | RESPIRATION RATE: 16 BRPM | WEIGHT: 222 LBS | TEMPERATURE: 98.7 F | BODY MASS INDEX: 40.85 KG/M2 | OXYGEN SATURATION: 95 % | HEART RATE: 93 BPM | HEIGHT: 62 IN | DIASTOLIC BLOOD PRESSURE: 57 MMHG

## 2018-11-27 DIAGNOSIS — N25.89 PSEUDOHYPOALDOSTERONISM, TYPE 2: Primary | ICD-10-CM

## 2018-11-27 PROBLEM — N93.9 VAGINAL SPOTTING: Status: ACTIVE | Noted: 2018-11-27

## 2018-11-27 PROBLEM — Z3A.28 28 WEEKS GESTATION OF PREGNANCY: Status: ACTIVE | Noted: 2018-08-12

## 2018-11-27 PROBLEM — R31.0 GROSS HEMATURIA: Status: ACTIVE | Noted: 2018-11-27

## 2018-11-27 PROBLEM — R10.9 ABDOMINAL CRAMPING: Status: ACTIVE | Noted: 2018-11-27

## 2018-11-27 LAB
BACTERIA UR QL AUTO: ABNORMAL /HPF
BILIRUB UR QL STRIP: NEGATIVE
CLARITY UR: ABNORMAL
COLOR UR: ABNORMAL
GLUCOSE UR STRIP-MCNC: NEGATIVE MG/DL
HGB UR QL STRIP.AUTO: ABNORMAL
HYALINE CASTS #/AREA URNS LPF: ABNORMAL /LPF
KETONES UR STRIP-MCNC: NEGATIVE MG/DL
LEUKOCYTE ESTERASE UR QL STRIP: ABNORMAL
NITRITE UR QL STRIP: NEGATIVE
NON-SQ EPI CELLS URNS QL MICRO: ABNORMAL /HPF
PH UR STRIP.AUTO: 6.5 [PH] (ref 4.5–8)
PROT UR STRIP-MCNC: ABNORMAL MG/DL
RBC #/AREA URNS AUTO: ABNORMAL /HPF
SP GR UR STRIP.AUTO: 1.02 (ref 1–1.03)
UROBILINOGEN UR QL STRIP.AUTO: 0.2 E.U./DL
WBC #/AREA URNS AUTO: ABNORMAL /HPF

## 2018-11-27 PROCEDURE — 99213 OFFICE O/P EST LOW 20 MIN: CPT

## 2018-11-27 PROCEDURE — 81001 URINALYSIS AUTO W/SCOPE: CPT | Performed by: STUDENT IN AN ORGANIZED HEALTH CARE EDUCATION/TRAINING PROGRAM

## 2018-11-27 PROCEDURE — 76817 TRANSVAGINAL US OBSTETRIC: CPT | Performed by: STUDENT IN AN ORGANIZED HEALTH CARE EDUCATION/TRAINING PROGRAM

## 2018-11-27 NOTE — TELEPHONE ENCOUNTER
----- Message from Sienna Boogie sent at 11/27/2018  8:16 AM EST -----  Regarding: FW:Your Recent Visit  Contact: [phone]      ----- Message -----  From: Deborah Loveehan  Sent: 11/26/2018   4:43 PM  To: [facility]  Subject: RE:Your Recent Visit                             Hi Dr Lacey Hamilton, since my visit last week my BP has  been kind of all over the place and on the low side  I will list all of the BP's below, and unless other wise stated there I have not taken my pill because I didn't want it to drop to low and cause a fainting spell or something equally as awful  Feel free to either give me a call @ [phone] or respond to this message  Thanks and have a good night! Tues (11/20) evening 122/69  Wed(11/21) morning 115/89 @ 8 am  Wed morning 124/63 @ 9:45 am  Wed afternoon 120/96 @11:05 am  Wed evening 131/50 @ 6:30 pm  Wed evening 138/73 @ 8:30 pm  Thurs morning (11/22) 114/62 @ 7:52 am  Thurs morning 119/63 9:35 am  Thurs afternoon 121/71 3:57 pm  Thurs evening 110/50 8:56 pm after half a pill  Fri (11/24)morning 115/68 7:30 am  Sat (11/25) afternoon 124/57 1:15 pm  Sun (11/26) morning 109/57 8:00 am  Sun morning 115/57 10:17 am  Mon (11/27) morning 118/60 6:15 am   ----- Message -----  From: Kristen Dickey MD  Sent: 11/19/2018 12:15 PM EST  To: Carol Juárez  Subject: Your Recent Visit  Deborah Barker, you have a new After Visit Summary in [address]  Please click on visits and then your most recent appointment, to view your After Visit Summary  If you are experiencing any technical issues please call our patient service desk at [phone]) option [phone]

## 2018-11-27 NOTE — TELEPHONE ENCOUNTER
Patient called stating she was concerned because she took her blood pressures this morning and they were low , BP was 102/69 then she waiting a couple minutes and took it again that reading was 95/71

## 2018-11-27 NOTE — TELEPHONE ENCOUNTER
Spoke to Laurence today via telephone today regarding results of the blood pressures that she submitted  Average blood pressure is 120/66  Recommend continuing to hold hydrochlorothiazide for right now and continue to monitor daily blood pressures to follow trend  I would like for her to check a BMP in the next couple days if possible to ensure that her potassium level remains stable off of meds  If serum potassium level is too high, may potentially need to resume hydrochlorothiazide at lower dose  Laurence stated her understanding and was in agreement with the plan

## 2018-11-28 ENCOUNTER — APPOINTMENT (OUTPATIENT)
Dept: LAB | Age: 18
End: 2018-11-28
Payer: COMMERCIAL

## 2018-11-28 ENCOUNTER — HOSPITAL ENCOUNTER (OUTPATIENT)
Dept: RADIOLOGY | Age: 18
Discharge: HOME | End: 2018-11-28
Payer: COMMERCIAL

## 2018-11-28 ENCOUNTER — TELEPHONE (OUTPATIENT)
Dept: NEPHROLOGY | Facility: CLINIC | Age: 18
End: 2018-11-28

## 2018-11-28 ENCOUNTER — TELEPHONE (OUTPATIENT)
Dept: OBGYN CLINIC | Facility: CLINIC | Age: 18
End: 2018-11-28

## 2018-11-28 DIAGNOSIS — N25.89 PSEUDOHYPOALDOSTERONISM, TYPE 2: Primary | ICD-10-CM

## 2018-11-28 DIAGNOSIS — N25.89 PSEUDOHYPOALDOSTERONISM, TYPE 2: ICD-10-CM

## 2018-11-28 DIAGNOSIS — R31.9 HEMATURIA: ICD-10-CM

## 2018-11-28 LAB
ANION GAP SERPL CALCULATED.3IONS-SCNC: 5 MMOL/L (ref 4–13)
BUN SERPL-MCNC: 8 MG/DL (ref 5–25)
CALCIUM SERPL-MCNC: 9.7 MG/DL (ref 8.3–10.1)
CHLORIDE SERPL-SCNC: 110 MMOL/L (ref 100–108)
CO2 SERPL-SCNC: 22 MMOL/L (ref 21–32)
CREAT SERPL-MCNC: 0.49 MG/DL (ref 0.6–1.3)
GFR SERPL CREATININE-BSD FRML MDRD: 141 ML/MIN/1.73SQ M
GLUCOSE SERPL-MCNC: 76 MG/DL (ref 65–140)
POTASSIUM SERPL-SCNC: 4.6 MMOL/L (ref 3.5–5.3)
SODIUM SERPL-SCNC: 137 MMOL/L (ref 136–145)

## 2018-11-28 PROCEDURE — 36415 COLL VENOUS BLD VENIPUNCTURE: CPT

## 2018-11-28 PROCEDURE — 76770 US EXAM ABDO BACK WALL COMP: CPT

## 2018-11-28 PROCEDURE — 80048 BASIC METABOLIC PNL TOTAL CA: CPT

## 2018-11-28 NOTE — DISCHARGE INSTRUCTIONS
Abdominal Pain in Pregnancy   WHAT YOU NEED TO KNOW:   Abdominal pain during pregnancy is common  Some of the causes include heartburn, constipation, gas, false labor, and round ligament pain  Round ligament pain is caused by stretching of the ligaments that support your uterus  Abdominal pain may be caused by a health problem, such as a stomach virus or appendicitis (inflammation of the appendix)  The pain may also be caused by a problem with your pregnancy, such as a threatened miscarriage or  labor  DISCHARGE INSTRUCTIONS:   Follow up with your obstetrician within 3 days:  Write down your questions so you remember to ask them during your visits  Self-care:   · Rest may help to relieve round ligament pain  Ask your healthcare provider about other ways to relieve this pain, such as a supportive belt or pregnancy exercises  · Use a heating pad set to the lowest setting or a hot compress to apply heat to your abdomen  Do this for 20 to 30 minutes every 2 hours for as many days as directed  · Avoid quick changes in position or movements that cause pain  · Do not lie flat in bed or bend over if you have heartburn  Ask your obstetrician if you should make any changes to the foods you eat  Ask if you can take any medicines for heartburn  · Eat more fiber and drink more liquids to relieve constipation  Fiber is found in fruits, vegetables, and whole-grain foods, such as whole-wheat bread and cereals  Ask how much liquid to drink each day and which liquids are best for you  Contact your obstetrician if:  · You continue to have abdominal pain that cannot be relieved  · You have a fever  · You have questions or concerns about your condition or care  Return to the emergency department if:   · You have sudden, severe pain or cramps that are so bad that you cannot walk or talk  · You have a fast heartbeat, shortness of breath, and you feel lightheaded or faint       · You have vaginal bleeding or discharge  · You have nausea, vomiting, fever, and severe pain on your right side  © 2017 [address] [employer] Information is for End User's use only and may not be sold, redistributed or otherwise used for commercial purposes  All illustrations and images included in CareNotes® are the copyrighted property of [employer]  or [employer]  The above information is an  only  It is not intended as medical advice for individual conditions or treatments  Talk to your doctor, nurse or pharmacist before following any medical regimen to see if it is safe and effective for you

## 2018-11-28 NOTE — TELEPHONE ENCOUNTER
STP:  No complaints; she spoke with nephrologist today  Sono and BMP done  Will f/u as scheduled and review tests

## 2018-11-28 NOTE — TELEPHONE ENCOUNTER
I called and spoke with Laurence  She agrees to getting a renal ultrasound done  She preferred to schedule it herself so I provided her with the central scheduling number  She was going to try and get it scheduled for some point today  No other concerns at the moment

## 2018-11-28 NOTE — PROCEDURES
Karlos Hancock, a  at 28w0d with an CHRIS of 2019, by Ultrasound, was seen at [address] [facility] for the following procedure(s): $Procedure Type: US - Transvaginal]      cervical length 3 29 cm, 3 point 5-3 cm, 3 46 cm  Vertex presentation of fetus  No funneling or debris noted  No placenta previa no Vasa previa noted    Supervised by Dr María Elena Andres              Ultrasound Other  Fetal Presentation: Vertex  Cervical Length: 3 5  Funnel: No  Debris: No  Placenta Previa: No  Vasa Previa: No

## 2018-11-28 NOTE — TELEPHONE ENCOUNTER
Spoke to Laurence  Renal ultrasound and blood work completed  Blood work results still pending  Renal ultrasound demonstrates right-sided hydronephrosis  It is possible that Laurence is trying to pass a kidney stone given the gross hematuria, flank pain and hydronephrosis noted on imaging  I have advised her to increase her fluid intake and discuss potential reasons to seek evaluation in the emergency room  Laurence try to strain her urine at home to see if she is able to catch the stone for analysis  Will be in touch once BMP has been resulted to discuss additional recommendations

## 2018-11-28 NOTE — TELEPHONE ENCOUNTER
Jakeankita called in stating that she went in to L&D last night due to blood in her urine  She stated that she noted that it started yesterday however only noticed blood when she wiped  It then slowly progressed to blood in her urine in the toilet  She then went to L&D where they did a urinalysis with microscopic and suggested that she call us today  L&D stated that everything was okay with her pregnancy/baby  She has no pain when she urinates but she did mention that she was experiencing some upper back pain - by her ribs

## 2018-11-28 NOTE — PROGRESS NOTES
L&D Triage Note - OB/GYN  Juju Alvarez 19 y o  female MRN: [id number]  Unit/Bed#:  Triage  Encounter: [id number]    Patient is seen by Maciel/Myke      ASSESSMENT:    Juju Alvarez is a 19 y o   at 31w0d with spotting and cramping, not in  labor, with gross hematuria    PLAN:    1) speculum exam with KOH, wet mount, dry slide, nitrazine  2) cervical length  3) SVE  4) urinalysis, follow-up with Nephrology  5) Continue routine prenatal care  6) Discharge from [facility] triage with  labor precautions   - Case discussed with Dr Sukhdeep Acosta:    Juju Alvarez 19 y o   at 28w0d with an Estimated Date of Delivery: 19 who comes in with a one-day complaint of cramping and spotting  She states that she saw blood in the toilet bowl and a few spots on her toilet paper that was bright red  She states that she has had some pelvic cramping and upper back pain      Her current obstetrical history is significant for chronic hypertension secondary to Darnell's syndrome (pseud hypoaldosteronism type 2), and obesity    Contractions:  Cramping  Leakage of fluid:   Denies  Vaginal Bleeding:  Spotting  Fetal movement: present    OBJECTIVE:    Vitals:    18   BP: 138/72   Pulse: 104   Resp: 20   Temp: 98 °F (36 7 °C)   SpO2: 95%       ROS:  Constitutional: Negative  Respiratory: Negative  Cardiovascular: Negative    Gastrointestinal: Negative    General Physical Exam:  General: in no apparent distress, alert, oriented times 3 and afebrile  Cardiovascular: Cor RRR  Lungs: non-labored breathing, CTAB  Abdomen: abdomen is soft without significant tenderness, masses, organomegaly or guarding  Lower extremeties: nontender    Cervical Exam  Speculum: Cervical os is closed with no evidence of discharge or bleeding, no bleeding in the vaginal vault  SVE: 0 / 0% / -5    Fetal monitoring:  FHT:  145 bpm/ Moderate 6 - 25 bpm / 15x15 accelerations, no decelerations  [place]: none     KOH/WTMT:     Infection:   - no clue cells    - 1 hyphae   - no trichomonads present    Membrane status   - no ferning   - neg nitrazene   - no pooling     Urine Dip    - trace protein, large blood    Imaging:       TVUS   - Cervical length    - 3 29cm    - 3 53cm    - 3 46cm   - Presentation: vertex        Lian Wolfe MD  PGY-1 OB/GYN Resident   11/27/2018 7:48 PM

## 2018-11-29 NOTE — TELEPHONE ENCOUNTER
Please let Laurence know that her electrolytes are stable off of medication  Recommend continuing to hold hydrochlorothiazide as discussed at visit and monitor BPs

## 2018-11-30 ENCOUNTER — TELEPHONE (OUTPATIENT)
Dept: OBGYN CLINIC | Facility: CLINIC | Age: 18
End: 2018-11-30

## 2018-11-30 ENCOUNTER — TELEPHONE (OUTPATIENT)
Dept: NEPHROLOGY | Facility: CLINIC | Age: 18
End: 2018-11-30

## 2018-11-30 ENCOUNTER — HOSPITAL ENCOUNTER (OUTPATIENT)
Facility: HOSPITAL | Age: 18
Discharge: HOME | End: 2018-11-30
Attending: OBSTETRICS & GYNECOLOGY | Admitting: OBSTETRICS & GYNECOLOGY
Payer: COMMERCIAL

## 2018-11-30 ENCOUNTER — ROUTINE PRENATAL (OUTPATIENT)
Dept: OBGYN CLINIC | Facility: CLINIC | Age: 18
End: 2018-11-30

## 2018-11-30 VITALS
RESPIRATION RATE: 18 BRPM | TEMPERATURE: 98.4 F | SYSTOLIC BLOOD PRESSURE: 122 MMHG | HEART RATE: 88 BPM | DIASTOLIC BLOOD PRESSURE: 59 MMHG

## 2018-11-30 VITALS — SYSTOLIC BLOOD PRESSURE: 160 MMHG | WEIGHT: 225 LBS | DIASTOLIC BLOOD PRESSURE: 100 MMHG | BODY MASS INDEX: 41.15 KG/M2

## 2018-11-30 DIAGNOSIS — O10.919 CHRONIC HYPERTENSION AFFECTING PREGNANCY: ICD-10-CM

## 2018-11-30 DIAGNOSIS — N25.89 PSEUDOHYPOALDOSTERONISM, TYPE 2: Primary | ICD-10-CM

## 2018-11-30 DIAGNOSIS — Z34.02 ENCOUNTER FOR SUPERVISION OF NORMAL FIRST PREGNANCY IN SECOND TRIMESTER: Primary | ICD-10-CM

## 2018-11-30 DIAGNOSIS — Z3A.28 28 WEEKS GESTATION OF PREGNANCY: ICD-10-CM

## 2018-11-30 LAB
ALBUMIN SERPL BCP-MCNC: 2.8 G/DL (ref 3.5–5)
ALP SERPL-CCNC: 87 U/L (ref 46–116)
ALT SERPL W P-5'-P-CCNC: 15 U/L (ref 12–78)
ANION GAP SERPL CALCULATED.3IONS-SCNC: 7 MMOL/L (ref 4–13)
AST SERPL W P-5'-P-CCNC: 9 U/L (ref 5–45)
BACTERIA UR QL AUTO: ABNORMAL /HPF
BASOPHILS # BLD AUTO: 0.03 THOUSANDS/ΜL (ref 0–0.1)
BASOPHILS NFR BLD AUTO: 0 % (ref 0–1)
BILIRUB SERPL-MCNC: 0.17 MG/DL (ref 0.2–1)
BILIRUB UR QL STRIP: NEGATIVE
BUN SERPL-MCNC: 6 MG/DL (ref 5–25)
CALCIUM SERPL-MCNC: 9.4 MG/DL (ref 8.3–10.1)
CHLORIDE SERPL-SCNC: 109 MMOL/L (ref 100–108)
CLARITY UR: ABNORMAL
CO2 SERPL-SCNC: 18 MMOL/L (ref 21–32)
COLOR UR: YELLOW
CREAT SERPL-MCNC: 0.45 MG/DL (ref 0.6–1.3)
CREAT UR-MCNC: 71.3 MG/DL
EOSINOPHIL # BLD AUTO: 0.02 THOUSAND/ΜL (ref 0–0.61)
EOSINOPHIL NFR BLD AUTO: 0 % (ref 0–6)
ERYTHROCYTE [DISTWIDTH] IN BLOOD BY AUTOMATED COUNT: 13.8 % (ref 11.6–15.1)
GFR SERPL CREATININE-BSD FRML MDRD: 145 ML/MIN/1.73SQ M
GLUCOSE SERPL-MCNC: 70 MG/DL (ref 65–140)
GLUCOSE UR STRIP-MCNC: NEGATIVE MG/DL
HCT VFR BLD AUTO: 36.9 % (ref 34.8–46.1)
HGB BLD-MCNC: 11.5 G/DL (ref 11.5–15.4)
HGB UR QL STRIP.AUTO: ABNORMAL
HYALINE CASTS #/AREA URNS LPF: ABNORMAL /LPF
IMM GRANULOCYTES # BLD AUTO: 0.06 THOUSAND/UL (ref 0–0.2)
IMM GRANULOCYTES NFR BLD AUTO: 1 % (ref 0–2)
KETONES UR STRIP-MCNC: NEGATIVE MG/DL
LEUKOCYTE ESTERASE UR QL STRIP: ABNORMAL
LYMPHOCYTES # BLD AUTO: 1.69 THOUSANDS/ΜL (ref 0.6–4.47)
LYMPHOCYTES NFR BLD AUTO: 15 % (ref 14–44)
MCH RBC QN AUTO: 28.6 PG (ref 26.8–34.3)
MCHC RBC AUTO-ENTMCNC: 31.2 G/DL (ref 31.4–37.4)
MCV RBC AUTO: 92 FL (ref 82–98)
MONOCYTES # BLD AUTO: 0.35 THOUSAND/ΜL (ref 0.17–1.22)
MONOCYTES NFR BLD AUTO: 3 % (ref 4–12)
NEUTROPHILS # BLD AUTO: 8.83 THOUSANDS/ΜL (ref 1.85–7.62)
NEUTS SEG NFR BLD AUTO: 81 % (ref 43–75)
NITRITE UR QL STRIP: NEGATIVE
NON-SQ EPI CELLS URNS QL MICRO: ABNORMAL /HPF
NRBC BLD AUTO-RTO: 0 /100 WBCS
PH UR STRIP.AUTO: 6.5 [PH] (ref 4.5–8)
PLATELET # BLD AUTO: 189 THOUSANDS/UL (ref 149–390)
PMV BLD AUTO: 10.4 FL (ref 8.9–12.7)
POTASSIUM SERPL-SCNC: 5.1 MMOL/L (ref 3.5–5.3)
PROT SERPL-MCNC: 6.6 G/DL (ref 6.4–8.2)
PROT UR STRIP-MCNC: NEGATIVE MG/DL
PROT UR-MCNC: 8 MG/DL
PROT/CREAT UR: 0.11 MG/G{CREAT} (ref 0–0.1)
RBC # BLD AUTO: 4.02 MILLION/UL (ref 3.81–5.12)
RBC #/AREA URNS AUTO: ABNORMAL /HPF
SODIUM SERPL-SCNC: 134 MMOL/L (ref 136–145)
SP GR UR STRIP.AUTO: 1.01 (ref 1–1.03)
UROBILINOGEN UR QL STRIP.AUTO: 0.2 E.U./DL
WBC # BLD AUTO: 10.98 THOUSAND/UL (ref 4.31–10.16)
WBC #/AREA URNS AUTO: ABNORMAL /HPF

## 2018-11-30 PROCEDURE — 82570 ASSAY OF URINE CREATININE: CPT | Performed by: OBSTETRICS & GYNECOLOGY

## 2018-11-30 PROCEDURE — 99213 OFFICE O/P EST LOW 20 MIN: CPT

## 2018-11-30 PROCEDURE — 85025 COMPLETE CBC W/AUTO DIFF WBC: CPT | Performed by: OBSTETRICS & GYNECOLOGY

## 2018-11-30 PROCEDURE — 80053 COMPREHEN METABOLIC PANEL: CPT | Performed by: OBSTETRICS & GYNECOLOGY

## 2018-11-30 PROCEDURE — 81001 URINALYSIS AUTO W/SCOPE: CPT | Performed by: OBSTETRICS & GYNECOLOGY

## 2018-11-30 PROCEDURE — 84156 ASSAY OF PROTEIN URINE: CPT | Performed by: OBSTETRICS & GYNECOLOGY

## 2018-11-30 PROCEDURE — PNV: Performed by: NURSE PRACTITIONER

## 2018-11-30 NOTE — DISCHARGE INSTRUCTIONS
Pregnancy at 32 to 30 Weeks   AMBULATORY CARE:   What changes are happening to your body: You may notice new symptoms such as shortness of breath, heartburn, or swelling of your ankles and feet  You may also have trouble sleeping or contractions  Seek care immediately if:   · You develop a severe headache that does not go away  · You have new or increased vision changes, such as blurred or spotted vision  · You have new or increased swelling in your face or hands  · You have vaginal spotting or bleeding  · Your water broke or you feel warm water gushing or trickling from your vagina  Contact your healthcare provider if:   · You have more than 5 contractions in 1 hour  · You notice any changes in your baby's movements  · You have abdominal cramps, pressure, or tightening  · You have a change in vaginal discharge  · You have chills or a fever  · You have vaginal itching, burning, or pain  · You have yellow, green, white, or foul-smelling vaginal discharge  · You have pain or burning when you urinate, less urine than usual, or pink or bloody urine  · You have questions or concerns about your condition or care  How to care for yourself at this stage of your pregnancy:   · Eat a variety of healthy foods  Healthy foods include fruits, vegetables, whole-grain breads, low-fat dairy foods, beans, lean meats, and fish  Drink liquids as directed  Ask how much liquid to drink each day and which liquids are best for you  Limit caffeine to less than 200 milligrams each day  Limit your intake of fish to 2 servings each week  Choose fish low in mercury such as canned light tuna, shrimp, salmon, cod, or tilapia  Do not  eat fish high in mercury such as swordfish, tilefish, ric mackerel, and shark  · Manage heartburn  by eating 4 or 5 small meals each day instead of large meals  Avoid spicy food  · Manage swelling  by lying down and putting your feet up       · Take prenatal vitamins as directed  Your need for certain vitamins and minerals, such as folic acid, increases during pregnancy  Prenatal vitamins provide some of the extra vitamins and minerals you need  Prenatal vitamins may also help to decrease the risk of certain birth defects  · Talk to your healthcare provider about exercise  Moderate exercise can help you stay fit  Your healthcare provider will help you plan an exercise program that is safe for you during pregnancy  · Do not smoke  If you smoke, it is never too late to quit  Smoking increases your risk of a miscarriage and other health problems during your pregnancy  Smoking can cause your baby to be born too early or weigh less at birth  Ask your healthcare provider for information if you need help quitting  · Do not drink alcohol  Alcohol passes from your body to your baby through the placenta  It can affect your baby's brain development and cause fetal alcohol syndrome (FAS)  FAS is a group of conditions that causes mental, behavior, and growth problems  · Talk to your healthcare provider before you take any medicines  Many medicines may harm your baby if you take them when you are pregnant  Do not take any medicines, vitamins, herbs, or supplements without first talking to your healthcare provider  Never use illegal or street drugs (such as marijuana or cocaine) while you are pregnant  Safety tips during pregnancy:   · Avoid hot tubs and saunas  Do not use a hot tub or sauna while you are pregnant, especially during your first trimester  Hot tubs and saunas may raise your baby's temperature and increase the risk of birth defects  · Avoid toxoplasmosis  This is an infection caused by eating raw meat or being around infected cat feces  It can cause birth defects, miscarriages, and other problems  Wash your hands after you touch raw meat  Make sure any meat is well-cooked before you eat it  Avoid raw eggs and unpasteurized milk   Use gloves or ask someone else to clean your cat's litter box while you are pregnant  Changes that are happening with your baby:  By 30 weeks, your baby may weigh more than 3 pounds  Your baby may be about 11 inches long from the top of the head to the rump (baby's bottom)  Your baby's eyes open and close now  Your baby's kicks and movements are more forceful at this time  What you need to know about prenatal care: Your healthcare provider will check your blood pressure and weight  You may also need the following:  · Blood tests  may be done to check for anemia or blood type  · A urine test  may also be done to check for sugar and protein  These can be signs of gestational diabetes or infection  Protein in your urine may also be a sign of preeclampsia  Preeclampsia is a condition that can develop during week 20 or later of your pregnancy  It causes high blood pressure, and it can cause problems with your kidneys and other organs  · A Tdap vaccine and flu vaccine  may be recommended by your healthcare provider  · A gestational diabetes screen  will be done using an oral glucose tolerance test (OGTT)  An OGTT starts with a blood sugar level check after you have not eaten for 8 hours  You are then given a glucose drink  Your blood sugar level is checked after 1 hour, 2 hours, and sometimes 3 hours  Healthcare providers look at how much your blood sugar level increases from the first check  · Fundal height  is a measurement of your uterus to check your baby's growth  This number is usually the same as the number of weeks that you have been pregnant  Your healthcare provider may also check your baby's position  · Your baby's heart rate  will be checked  © 2017 [facility] Information is for End User's use only and may not be sold, redistributed or otherwise used for commercial purposes   All illustrations and images included in CareNotes® are the copyrighted property of A D A M , Inc  or [employer]  The above information is an  only  It is not intended as medical advice for individual conditions or treatments  Talk to your doctor, nurse or pharmacist before following any medical regimen to see if it is safe and effective for you

## 2018-11-30 NOTE — PROGRESS NOTES
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  It was discontinued recently by her nephrologist because of low BP's  readings in the 120-110s  Nephrologist  has been monitoring her BP and Potassium levels  Last Potassium level 4 6  She had an intermittent  headache since Tuesday  which she describes as " swooshing"  She denies, SOB, CP, RUQ pain, visual disturbances, facial swelling  OB Cx: chronic HTN, Darnell's syndrome ( type 4 RTA)         Vitals:   Temp:  [98 4 °F (36 9 °C)] 98 4 °F (36 9 °C)  HR:  [96] 96  Resp:  [18] 18  BP: (130-160)/() 130/69    Physical Exam:  General: AAOX3  No acute distress   Abdominal: Soft  NT/ND  Gravid             Ultrasound: see fetal testing note      · A/P:  at 28w3d here for rule out preeclampsia without severe features  Blood pressure in triage 130/69  Given patients Gordons syndrome I will consult Perinatology to monitor BP and serum Potassium level  I will also order pre-eclamptic work up consisting of CBC, CMP, Protein/Cr and UA  Dr Mckenna Miller aware      Signature/Title: Vanita Alfaro MD  Date: 2018  Time: 1:05 PM

## 2018-11-30 NOTE — TELEPHONE ENCOUNTER
OB - 28 3/7 wk - c/o increased fatigue & lethargy past 3 days, "circles under eyes, pale"  Recent f/u with nephrology - ? kidney stone - had CMP done 11/28/18 - off HCTZ per their recom  Next OB appt 12/17/18  Will see in office today

## 2018-11-30 NOTE — PROGRESS NOTES
Reviewed preeclamptic labs with patient and Dr Isa Degroot  Patient does not have preeclampsia at this time  CBC: Hgb 11 5g/dL, platelet 684  CMP: Na 134, K 5 1, Cr 0 45, AST 9, ALT 15  Pr/Cr: 0 11    Bps 110s-130s/50s-70s in triage  Patient okay to go home with instructions for close follow up with Dr Isa Degroot and nephrology  Discussed with patient her elevated potassium and lower sodium level, given her diagnosis of Darnell's syndrome  Patient will follow up with nephrology today      Virgil Galo MD  OBN, PGY-2  11/30/2018  3:00 PM

## 2018-11-30 NOTE — TELEPHONE ENCOUNTER
----- Message from Maria Eugenia Nash sent at 11/30/2018  8:22 AM EST -----  Regarding: FW:Your Recent Visit  Contact: [phone]      ----- Message -----  From: Beatrice Pineda  Sent: 11/30/2018   7:07 AM  To: [facility]  Subject: RE:Your Recent Visit                             Hi Dr Rodolfo Pineda I know you are probably tired of hearing from me but I have been feeling extremely  lethargic over the past three days to to point where I cant even drive myself anywhere  My BPs have been fairly consistent but due to the hydro nephrosis on my right kidney my mom and I are both concerned it may have something to do with that  I wanted to check with you first to see if it could be related  I will be calling Dr Yanira Ho today as well to see him to try and figure out whats going on  Thanks for your help  Isaac Gill  ----- Message -----  From: Markus Mello MD  Sent: 11/19/2018 12:15 PM EST  To: Pallavi Juárez  Subject: Your Recent Visit  Beatrice Pineda, you have a new After Visit Summary in [address] [place], [place] [address]  Please click on visits and then your most recent appointment, to view your After Visit Summary  If you are experiencing any technical issues please call our patient service desk at [phone]) option [phone]

## 2018-11-30 NOTE — TELEPHONE ENCOUNTER
Spoke to Laurence- unlikely that hydronephrosis is the cause of her recent fatigue  Not sleeping well at night and has been waking frequently  BPs have stayed relatively stable  Agree with checking with her OB but will likely need to try and have more rest and hydration and monitor symptoms  Laurence was in agreement with the plan

## 2018-11-30 NOTE — LETTER
[address] [facility]  [address] [facility] [address]  Dept: [phone]    November 30, 2018     Patient: Marlee Floyd   YOB: 2000   Date of Visit: 11/27/2018       To Whom it May Concern:    Alexander Wilfrido is under my professional care  She was seen in the hospital on 11/30/18  If you have any questions or concerns, please don't hesitate to call           Sincerely,          Mary Rivera MD  Department of Obstetrics and Gynecology  [facility]  [phone]

## 2018-11-30 NOTE — PROGRESS NOTES
Denies LOF/Bleeding  - contractions  +FM  No blood urine noted in the past 24 hours  + back pain  BP in office today 160/100 and 160/80  Repeat /88 on the right    Normal reflexes  Complains of lethargy since Tuesday, HA's started yesterday sharp, with a pulsating feeling "like swooshing"  No blurred  Denies CP, SOB  +epigastric which started Tuesday  Patient has J Luis Pander Syndrome and his management by her nephrologist  HCTZ was discontinued by nephrologist one week ago due reading being in the 120-110's  She has been monitoring her BP at home and it has been been stable  Patient sent to triage for monitoring and work up of pre-eclampsia  [facility] was seen today for routine prenatal visit      Diagnoses and all orders for this visit:    Encounter for supervision of normal first pregnancy in second trimester

## 2018-12-03 ENCOUNTER — ULTRASOUND (OUTPATIENT)
Dept: PERINATAL CARE | Facility: CLINIC | Age: 18
End: 2018-12-03
Payer: COMMERCIAL

## 2018-12-03 VITALS
HEART RATE: 115 BPM | WEIGHT: 228.6 LBS | DIASTOLIC BLOOD PRESSURE: 75 MMHG | HEIGHT: 62 IN | SYSTOLIC BLOOD PRESSURE: 124 MMHG | BODY MASS INDEX: 42.07 KG/M2

## 2018-12-03 DIAGNOSIS — O10.919 CHRONIC HYPERTENSION AFFECTING PREGNANCY: Primary | ICD-10-CM

## 2018-12-03 DIAGNOSIS — Z3A.28 28 WEEKS GESTATION OF PREGNANCY: ICD-10-CM

## 2018-12-03 PROCEDURE — 76816 OB US FOLLOW-UP PER FETUS: CPT | Performed by: OBSTETRICS & GYNECOLOGY

## 2018-12-03 PROCEDURE — 99213 OFFICE O/P EST LOW 20 MIN: CPT | Performed by: OBSTETRICS & GYNECOLOGY

## 2018-12-03 RX ORDER — BENAZEPRIL HYDROCHLORIDE AND HYDROCHLOROTHIAZIDE 20; 12.5 MG/1; MG/1
1 TABLET ORAL DAILY
COMMUNITY
End: 2018-12-03

## 2018-12-03 RX ORDER — HYDROCHLOROTHIAZIDE 12.5 MG/1
12.5 TABLET ORAL DAILY
Qty: 90 TABLET | Refills: 3
Start: 2018-12-03 | End: 2019-02-27 | Stop reason: SDUPTHER

## 2018-12-03 NOTE — PROGRESS NOTES
The patient was seen today for an ultrasound  Please see ultrasound report (located under Ob Procedures) for additional details  Thank you very much for allowing us to participate in the care of this very nice patient  Should you have any questions, please do not hesitate to contact me  Haja Sanders MD [address] [facility]  Attending Physician, [place]

## 2018-12-03 NOTE — PATIENT INSTRUCTIONS

## 2018-12-04 ENCOUNTER — LAB (OUTPATIENT)
Dept: LAB | Age: 18
End: 2018-12-04
Payer: COMMERCIAL

## 2018-12-04 ENCOUNTER — TRANSCRIBE ORDERS (OUTPATIENT)
Dept: ADMINISTRATIVE | Age: 18
End: 2018-12-04

## 2018-12-04 DIAGNOSIS — O10.919 CHRONIC HYPERTENSION AFFECTING PREGNANCY: ICD-10-CM

## 2018-12-04 LAB
ANION GAP SERPL CALCULATED.3IONS-SCNC: 7 MMOL/L (ref 4–13)
BUN SERPL-MCNC: 9 MG/DL (ref 5–25)
CALCIUM SERPL-MCNC: 9.7 MG/DL (ref 8.3–10.1)
CHLORIDE SERPL-SCNC: 105 MMOL/L (ref 100–108)
CO2 SERPL-SCNC: 22 MMOL/L (ref 21–32)
CREAT SERPL-MCNC: 0.55 MG/DL (ref 0.6–1.3)
GFR SERPL CREATININE-BSD FRML MDRD: 135 ML/MIN/1.73SQ M
GLUCOSE SERPL-MCNC: 75 MG/DL (ref 65–140)
POTASSIUM SERPL-SCNC: 4.3 MMOL/L (ref 3.5–5.3)
SODIUM SERPL-SCNC: 134 MMOL/L (ref 136–145)

## 2018-12-04 PROCEDURE — 36415 COLL VENOUS BLD VENIPUNCTURE: CPT

## 2018-12-04 PROCEDURE — 80048 BASIC METABOLIC PNL TOTAL CA: CPT

## 2018-12-05 ENCOUNTER — TELEPHONE (OUTPATIENT)
Dept: NEPHROLOGY | Facility: CLINIC | Age: 18
End: 2018-12-05

## 2018-12-05 ENCOUNTER — TELEPHONE (OUTPATIENT)
Dept: PERINATAL CARE | Facility: CLINIC | Age: 18
End: 2018-12-05

## 2018-12-05 NOTE — TELEPHONE ENCOUNTER
----- Message from Claudine Rivera MD sent at 12/5/2018  8:21 AM EST -----  I have reviewed the results which are normal   Please call patient and notify her of normal results  Thank you

## 2018-12-05 NOTE — TELEPHONE ENCOUNTER
Pt has been made aware that electrolytes are stable with recent blood work, she has no questions or concerns at this time  ----- Message from Deandre Yi MD sent at 12/5/2018  7:52 AM EST -----  Please let her know that electrolytes are stable with recent blood work

## 2018-12-17 ENCOUNTER — ROUTINE PRENATAL (OUTPATIENT)
Dept: OBGYN CLINIC | Facility: CLINIC | Age: 18
End: 2018-12-17

## 2018-12-17 VITALS — WEIGHT: 230 LBS | DIASTOLIC BLOOD PRESSURE: 80 MMHG | SYSTOLIC BLOOD PRESSURE: 130 MMHG | BODY MASS INDEX: 42.07 KG/M2

## 2018-12-17 DIAGNOSIS — N25.89 PSEUDOHYPOALDOSTERONISM, TYPE 2: ICD-10-CM

## 2018-12-17 DIAGNOSIS — Z34.03 ENCOUNTER FOR SUPERVISION OF NORMAL FIRST PREGNANCY IN THIRD TRIMESTER: Primary | ICD-10-CM

## 2018-12-17 PROCEDURE — PNV: Performed by: OBSTETRICS & GYNECOLOGY

## 2018-12-17 RX ORDER — AMOXICILLIN AND CLAVULANATE POTASSIUM 875; 125 MG/1; MG/1
1 TABLET, FILM COATED ORAL 2 TIMES DAILY
Refills: 0 | COMMUNITY
Start: 2018-12-11 | End: 2019-01-07 | Stop reason: ALTCHOICE

## 2018-12-17 NOTE — PROGRESS NOTES
Recovering from URI and on antibiotics - declines TDAP at this time  No further hematuria  Back on HCTZ and BP wnl for patient  Good FM  No cramping or VB

## 2018-12-31 ENCOUNTER — ULTRASOUND (OUTPATIENT)
Dept: PERINATAL CARE | Facility: CLINIC | Age: 18
End: 2018-12-31
Payer: COMMERCIAL

## 2018-12-31 ENCOUNTER — ROUTINE PRENATAL (OUTPATIENT)
Dept: OBGYN CLINIC | Facility: CLINIC | Age: 18
End: 2018-12-31

## 2018-12-31 VITALS
HEART RATE: 96 BPM | BODY MASS INDEX: 42.69 KG/M2 | SYSTOLIC BLOOD PRESSURE: 122 MMHG | DIASTOLIC BLOOD PRESSURE: 76 MMHG | HEIGHT: 62 IN | RESPIRATION RATE: 20 BRPM | WEIGHT: 232 LBS

## 2018-12-31 VITALS — WEIGHT: 231 LBS | SYSTOLIC BLOOD PRESSURE: 128 MMHG | DIASTOLIC BLOOD PRESSURE: 78 MMHG | BODY MASS INDEX: 42.25 KG/M2

## 2018-12-31 DIAGNOSIS — Z3A.32 32 WEEKS GESTATION OF PREGNANCY: ICD-10-CM

## 2018-12-31 DIAGNOSIS — O99.213 MATERNAL MORBID OBESITY IN THIRD TRIMESTER, ANTEPARTUM (HCC): ICD-10-CM

## 2018-12-31 DIAGNOSIS — O10.913 MATERNAL CHRONIC HYPERTENSION, THIRD TRIMESTER: Primary | ICD-10-CM

## 2018-12-31 DIAGNOSIS — E66.01 MATERNAL MORBID OBESITY IN THIRD TRIMESTER, ANTEPARTUM (HCC): ICD-10-CM

## 2018-12-31 DIAGNOSIS — Z34.03 ENCOUNTER FOR SUPERVISION OF NORMAL FIRST PREGNANCY IN THIRD TRIMESTER: Primary | ICD-10-CM

## 2018-12-31 PROCEDURE — 59025 FETAL NON-STRESS TEST: CPT | Performed by: OBSTETRICS & GYNECOLOGY

## 2018-12-31 PROCEDURE — 76816 OB US FOLLOW-UP PER FETUS: CPT | Performed by: OBSTETRICS & GYNECOLOGY

## 2018-12-31 PROCEDURE — 99212 OFFICE O/P EST SF 10 MIN: CPT | Performed by: OBSTETRICS & GYNECOLOGY

## 2018-12-31 PROCEDURE — PNV: Performed by: NURSE PRACTITIONER

## 2018-12-31 NOTE — PROGRESS NOTES
Please refer to the [facility] ultrasound report in Ob Procedures for additional information regarding the visit to the [facility]  today

## 2018-12-31 NOTE — LETTER
December 31, 2018     Tash Joseph MD  [address]  [address]  [address] [place] [address]    Patient: Wilbert Phillip   YOB: 2000   Date of Visit: 12/31/2018       Dear Dr Gandhi Needle: Thank you for referring Leisa Thomson to me for evaluation  Below are my notes for this consultation  If you have questions, please do not hesitate to call me  I look forward to following your patient along with you  Sincerely,        Marisa Vargas MD        CC: No Recipients  Marisa Vargas MD  12/31/2018 12:30 PM  Sign at close encounter  Please refer to the [facility] ultrasound report in Ob Procedures for additional information regarding the visit to the [facility]  today

## 2018-12-31 NOTE — PROGRESS NOTES
Patient is doing well  Denies LOF/Bleeding/Cramping  +FM  Blood pressure is stable  She will get her TDAP from her pcp next week  RTO in 2 weeks

## 2019-01-03 ENCOUNTER — ROUTINE PRENATAL (OUTPATIENT)
Dept: PERINATAL CARE | Facility: CLINIC | Age: 19
End: 2019-01-03
Payer: COMMERCIAL

## 2019-01-03 VITALS
HEART RATE: 90 BPM | DIASTOLIC BLOOD PRESSURE: 76 MMHG | WEIGHT: 233.6 LBS | HEIGHT: 62 IN | SYSTOLIC BLOOD PRESSURE: 128 MMHG | BODY MASS INDEX: 42.99 KG/M2

## 2019-01-03 DIAGNOSIS — O10.913 MATERNAL CHRONIC HYPERTENSION, THIRD TRIMESTER: Primary | ICD-10-CM

## 2019-01-03 DIAGNOSIS — Z3A.33 33 WEEKS GESTATION OF PREGNANCY: ICD-10-CM

## 2019-01-03 PROCEDURE — 59025 FETAL NON-STRESS TEST: CPT | Performed by: OBSTETRICS & GYNECOLOGY

## 2019-01-07 ENCOUNTER — ROUTINE PRENATAL (OUTPATIENT)
Dept: PERINATAL CARE | Facility: CLINIC | Age: 19
End: 2019-01-07
Payer: COMMERCIAL

## 2019-01-07 VITALS
BODY MASS INDEX: 42.88 KG/M2 | WEIGHT: 233 LBS | HEIGHT: 62 IN | SYSTOLIC BLOOD PRESSURE: 121 MMHG | DIASTOLIC BLOOD PRESSURE: 78 MMHG | HEART RATE: 87 BPM

## 2019-01-07 DIAGNOSIS — Z3A.33 33 WEEKS GESTATION OF PREGNANCY: ICD-10-CM

## 2019-01-07 DIAGNOSIS — O99.213 MATERNAL MORBID OBESITY IN THIRD TRIMESTER, ANTEPARTUM (HCC): ICD-10-CM

## 2019-01-07 DIAGNOSIS — E66.01 MATERNAL MORBID OBESITY IN THIRD TRIMESTER, ANTEPARTUM (HCC): ICD-10-CM

## 2019-01-07 DIAGNOSIS — O10.919 CHRONIC HYPERTENSION AFFECTING PREGNANCY: Primary | ICD-10-CM

## 2019-01-07 PROCEDURE — 76815 OB US LIMITED FETUS(S): CPT | Performed by: OBSTETRICS & GYNECOLOGY

## 2019-01-07 PROCEDURE — 59025 FETAL NON-STRESS TEST: CPT | Performed by: OBSTETRICS & GYNECOLOGY

## 2019-01-10 ENCOUNTER — ROUTINE PRENATAL (OUTPATIENT)
Dept: PERINATAL CARE | Facility: CLINIC | Age: 19
End: 2019-01-10
Payer: COMMERCIAL

## 2019-01-10 VITALS
DIASTOLIC BLOOD PRESSURE: 79 MMHG | WEIGHT: 235 LBS | HEIGHT: 62 IN | SYSTOLIC BLOOD PRESSURE: 122 MMHG | BODY MASS INDEX: 43.24 KG/M2 | HEART RATE: 89 BPM

## 2019-01-10 DIAGNOSIS — O99.213 MATERNAL MORBID OBESITY IN THIRD TRIMESTER, ANTEPARTUM (HCC): ICD-10-CM

## 2019-01-10 DIAGNOSIS — O10.919 CHRONIC HYPERTENSION AFFECTING PREGNANCY: Primary | ICD-10-CM

## 2019-01-10 DIAGNOSIS — E66.01 MATERNAL MORBID OBESITY IN THIRD TRIMESTER, ANTEPARTUM (HCC): ICD-10-CM

## 2019-01-10 DIAGNOSIS — Z3A.34 34 WEEKS GESTATION OF PREGNANCY: ICD-10-CM

## 2019-01-10 PROCEDURE — 59025 FETAL NON-STRESS TEST: CPT | Performed by: OBSTETRICS & GYNECOLOGY

## 2019-01-14 ENCOUNTER — ROUTINE PRENATAL (OUTPATIENT)
Dept: PERINATAL CARE | Facility: CLINIC | Age: 19
End: 2019-01-14
Payer: COMMERCIAL

## 2019-01-14 ENCOUNTER — ROUTINE PRENATAL (OUTPATIENT)
Dept: OBGYN CLINIC | Facility: CLINIC | Age: 19
End: 2019-01-14

## 2019-01-14 VITALS — WEIGHT: 232.6 LBS | SYSTOLIC BLOOD PRESSURE: 146 MMHG | DIASTOLIC BLOOD PRESSURE: 82 MMHG | BODY MASS INDEX: 42.54 KG/M2

## 2019-01-14 VITALS
WEIGHT: 232 LBS | SYSTOLIC BLOOD PRESSURE: 137 MMHG | BODY MASS INDEX: 42.69 KG/M2 | HEIGHT: 62 IN | DIASTOLIC BLOOD PRESSURE: 82 MMHG | HEART RATE: 92 BPM

## 2019-01-14 DIAGNOSIS — Z34.83 PRENATAL CARE, SUBSEQUENT PREGNANCY, THIRD TRIMESTER: Primary | ICD-10-CM

## 2019-01-14 DIAGNOSIS — Z3A.34 34 WEEKS GESTATION OF PREGNANCY: ICD-10-CM

## 2019-01-14 DIAGNOSIS — O10.913 MATERNAL CHRONIC HYPERTENSION, THIRD TRIMESTER: Primary | ICD-10-CM

## 2019-01-14 DIAGNOSIS — E66.01 MATERNAL MORBID OBESITY IN THIRD TRIMESTER, ANTEPARTUM (HCC): ICD-10-CM

## 2019-01-14 DIAGNOSIS — Z36.85 ANTENATAL SCREENING FOR STREPTOCOCCUS B: ICD-10-CM

## 2019-01-14 DIAGNOSIS — O99.213 MATERNAL MORBID OBESITY IN THIRD TRIMESTER, ANTEPARTUM (HCC): ICD-10-CM

## 2019-01-14 PROCEDURE — PNV: Performed by: OBSTETRICS & GYNECOLOGY

## 2019-01-14 PROCEDURE — 59025 FETAL NON-STRESS TEST: CPT | Performed by: OBSTETRICS & GYNECOLOGY

## 2019-01-14 PROCEDURE — 76815 OB US LIMITED FETUS(S): CPT | Performed by: OBSTETRICS & GYNECOLOGY

## 2019-01-14 PROCEDURE — 87653 STREP B DNA AMP PROBE: CPT | Performed by: OBSTETRICS & GYNECOLOGY

## 2019-01-14 NOTE — PROGRESS NOTES
GBS culture obtained, patient reminded to bring the urine specimen, she will continue fetal surveillance  Will prior make arrangements for induction of labor after 38 weeks gestation

## 2019-01-14 NOTE — PATIENT INSTRUCTIONS
The patient was reminded to bring the urine at weekly visits, GBS culture done today  Continue fetal surveillance this week  Will need to make arrangements for induction at 38 weeks gestation

## 2019-01-14 NOTE — PROGRESS NOTES
Please refer to the [facility] ultrasound report in Ob Procedures for additional information regarding the visit to the [facility]  today

## 2019-01-17 ENCOUNTER — ROUTINE PRENATAL (OUTPATIENT)
Dept: PERINATAL CARE | Facility: CLINIC | Age: 19
End: 2019-01-17
Payer: COMMERCIAL

## 2019-01-17 VITALS
HEART RATE: 96 BPM | BODY MASS INDEX: 42.87 KG/M2 | SYSTOLIC BLOOD PRESSURE: 138 MMHG | WEIGHT: 234.4 LBS | DIASTOLIC BLOOD PRESSURE: 72 MMHG

## 2019-01-17 DIAGNOSIS — E66.01 MATERNAL MORBID OBESITY IN THIRD TRIMESTER, ANTEPARTUM (HCC): ICD-10-CM

## 2019-01-17 DIAGNOSIS — Z3A.35 35 WEEKS GESTATION OF PREGNANCY: ICD-10-CM

## 2019-01-17 DIAGNOSIS — O99.213 MATERNAL MORBID OBESITY IN THIRD TRIMESTER, ANTEPARTUM (HCC): ICD-10-CM

## 2019-01-17 DIAGNOSIS — O10.919 CHRONIC HYPERTENSION AFFECTING PREGNANCY: Primary | ICD-10-CM

## 2019-01-17 LAB — GP B STREP DNA SPEC QL NAA+PROBE: NORMAL

## 2019-01-17 PROCEDURE — 59025 FETAL NON-STRESS TEST: CPT | Performed by: OBSTETRICS & GYNECOLOGY

## 2019-01-17 NOTE — PROGRESS NOTES
NST procedure and expected outcome explained to patient  Daily fetal kick count reviewed and emphasized  Patient verbalized understanding of all and was receptive      Katie Sandoval RN

## 2019-01-21 ENCOUNTER — ROUTINE PRENATAL (OUTPATIENT)
Dept: OBGYN CLINIC | Facility: CLINIC | Age: 19
End: 2019-01-21

## 2019-01-21 ENCOUNTER — ROUTINE PRENATAL (OUTPATIENT)
Dept: PERINATAL CARE | Facility: CLINIC | Age: 19
End: 2019-01-21
Payer: COMMERCIAL

## 2019-01-21 VITALS
WEIGHT: 236.4 LBS | DIASTOLIC BLOOD PRESSURE: 82 MMHG | HEIGHT: 62 IN | HEART RATE: 94 BPM | SYSTOLIC BLOOD PRESSURE: 119 MMHG | BODY MASS INDEX: 43.5 KG/M2

## 2019-01-21 VITALS — DIASTOLIC BLOOD PRESSURE: 80 MMHG | BODY MASS INDEX: 42.95 KG/M2 | WEIGHT: 234.8 LBS | SYSTOLIC BLOOD PRESSURE: 138 MMHG

## 2019-01-21 DIAGNOSIS — E66.01 MATERNAL MORBID OBESITY IN THIRD TRIMESTER, ANTEPARTUM (HCC): ICD-10-CM

## 2019-01-21 DIAGNOSIS — Z3A.35 35 WEEKS GESTATION OF PREGNANCY: ICD-10-CM

## 2019-01-21 DIAGNOSIS — O10.913 MATERNAL CHRONIC HYPERTENSION, THIRD TRIMESTER: Primary | ICD-10-CM

## 2019-01-21 DIAGNOSIS — O99.213 MATERNAL MORBID OBESITY IN THIRD TRIMESTER, ANTEPARTUM (HCC): ICD-10-CM

## 2019-01-21 DIAGNOSIS — Z34.83 PRENATAL CARE, SUBSEQUENT PREGNANCY, THIRD TRIMESTER: Primary | ICD-10-CM

## 2019-01-21 PROCEDURE — 59025 FETAL NON-STRESS TEST: CPT | Performed by: OBSTETRICS & GYNECOLOGY

## 2019-01-21 PROCEDURE — PNV: Performed by: OBSTETRICS & GYNECOLOGY

## 2019-01-21 PROCEDURE — 76815 OB US LIMITED FETUS(S): CPT | Performed by: OBSTETRICS & GYNECOLOGY

## 2019-01-21 NOTE — PROGRESS NOTES
Please refer to the [facility] ultrasound report in Ob Procedures for additional information regarding the visit to the [facility]  today

## 2019-01-21 NOTE — PROGRESS NOTES
Doing well positive fetal movement, we still plan on inducing returning 38 and 39 weeks gestation if her cervix is favorable

## 2019-01-21 NOTE — PATIENT INSTRUCTIONS
Positive fetal movement was still plan on inducing at 38 weeks gestation    Blood pressure seems to be stable the present time she will continue fetal surveillance at the [place] [address]

## 2019-01-24 ENCOUNTER — ROUTINE PRENATAL (OUTPATIENT)
Dept: PERINATAL CARE | Facility: CLINIC | Age: 19
End: 2019-01-24
Payer: COMMERCIAL

## 2019-01-24 VITALS
SYSTOLIC BLOOD PRESSURE: 117 MMHG | DIASTOLIC BLOOD PRESSURE: 74 MMHG | HEART RATE: 96 BPM | HEIGHT: 62 IN | BODY MASS INDEX: 43.72 KG/M2 | WEIGHT: 237.6 LBS

## 2019-01-24 DIAGNOSIS — Z3A.36 36 WEEKS GESTATION OF PREGNANCY: Primary | ICD-10-CM

## 2019-01-24 DIAGNOSIS — O10.919 CHRONIC HYPERTENSION AFFECTING PREGNANCY: ICD-10-CM

## 2019-01-24 DIAGNOSIS — R53.83 LETHARGY: Primary | ICD-10-CM

## 2019-01-24 PROCEDURE — 59025 FETAL NON-STRESS TEST: CPT | Performed by: OBSTETRICS & GYNECOLOGY

## 2019-01-24 NOTE — PROGRESS NOTES
Patient is requesting a BMP to check her electrolytes because she has been lethargic for about a week  Some nausea  Denies any other symptoms  She is on hydrochlorothiazide for Gordons syndrome  She was seen at  center today for an NST - report pending  Patient states strip was reactive  She is having good fetal movement  Blood pressure has been stable  BMP ordered  Will call patient with results  She knows to call any concerns or symptoms

## 2019-01-25 ENCOUNTER — TELEPHONE (OUTPATIENT)
Dept: OBGYN CLINIC | Facility: CLINIC | Age: 19
End: 2019-01-25

## 2019-01-25 ENCOUNTER — HOSPITAL ENCOUNTER (OUTPATIENT)
Facility: HOSPITAL | Age: 19
Discharge: HOME | End: 2019-01-25
Attending: OBSTETRICS & GYNECOLOGY | Admitting: OBSTETRICS & GYNECOLOGY
Payer: COMMERCIAL

## 2019-01-25 VITALS
RESPIRATION RATE: 20 BRPM | SYSTOLIC BLOOD PRESSURE: 103 MMHG | DIASTOLIC BLOOD PRESSURE: 55 MMHG | TEMPERATURE: 98.6 F | HEART RATE: 98 BPM | OXYGEN SATURATION: 97 %

## 2019-01-25 PROBLEM — O10.413: Status: ACTIVE | Noted: 2019-01-25

## 2019-01-25 PROBLEM — Z3A.36 36 WEEKS GESTATION OF PREGNANCY: Status: ACTIVE | Noted: 2018-08-12

## 2019-01-25 PROBLEM — I15.1: Status: ACTIVE | Noted: 2019-01-25

## 2019-01-25 PROCEDURE — 76815 OB US LIMITED FETUS(S): CPT | Performed by: OBSTETRICS & GYNECOLOGY

## 2019-01-25 PROCEDURE — 99212 OFFICE O/P EST SF 10 MIN: CPT | Performed by: OBSTETRICS & GYNECOLOGY

## 2019-01-25 PROCEDURE — 99213 OFFICE O/P EST LOW 20 MIN: CPT

## 2019-01-25 NOTE — TELEPHONE ENCOUNTER
OB 36 3/7 wk - c/o increased cramping, more constant, like menstrual cramps since 12 am - also more watery vag discharge since last night  Cramping more intense since 6 am but not intermittent pattern  (+) FM  To triage/JSW  Pt informed  LR notified

## 2019-01-25 NOTE — DISCHARGE INSTRUCTIONS
Pregnancy at 28 to [address]:   What changes are happening in my body? You are considered full term at the beginning of 37 weeks  Your breathing may be easier if your baby has moved down into a head-down position  You may need to urinate more often because the baby may be pressing on your bladder  You may also feel more discomfort and get tired easily  How do I care for myself at this stage of my pregnancy? · Eat a variety of healthy foods  Healthy foods include fruits, vegetables, whole-grain breads, low-fat dairy foods, beans, lean meats, and fish  Drink liquids as directed  Ask how much liquid to drink each day and which liquids are best for you  Limit caffeine to less than 200 milligrams each day  Limit your intake of fish to 2 servings each week  Choose fish low in mercury such as canned light tuna, shrimp, salmon, cod, or tilapia  Do not  eat fish high in mercury such as swordfish, tilefish, ric mackerel, and shark  · Take prenatal vitamins as directed  Your need for certain vitamins and minerals, such as folic acid, increases during pregnancy  Prenatal vitamins provide some of the extra vitamins and minerals you need  Prenatal vitamins may also help to decrease the risk of certain birth defects  · Rest as needed  Put your feet up if you have swelling in your ankles and feet  · Do not smoke  If you smoke, it is never too late to quit  Smoking increases your risk of a miscarriage and other health problems during your pregnancy  Smoking can cause your baby to be born too early or weigh less at birth  Ask your healthcare provider for information if you need help quitting  · Do not drink alcohol  Alcohol passes from your body to your baby through the placenta  It can affect your baby's brain development and cause fetal alcohol syndrome (FAS)  FAS is a group of conditions that causes mental, behavior, and growth problems       · Talk to your healthcare provider before you take any medicines  Many medicines may harm your baby if you take them when you are pregnant  Do not take any medicines, vitamins, herbs, or supplements without first talking to your healthcare provider  Never use illegal or street drugs (such as marijuana or cocaine) while you are pregnant  · Talk to your healthcare provider before you travel  You may not be able to travel in an airplane after 36 weeks  He may also recommend that you avoid long road trips  What are some safety tips during pregnancy? · Avoid hot tubs and saunas  Do not use a hot tub or sauna while you are pregnant, especially during your first trimester  Hot tubs and saunas may raise your baby's temperature and increase the risk of birth defects  · Avoid toxoplasmosis  This is an infection caused by eating raw meat or being around infected cat feces  It can cause birth defects, miscarriages, and other problems  Wash your hands after you touch raw meat  Make sure any meat is well-cooked before you eat it  Avoid raw eggs and unpasteurized milk  Use gloves or ask someone else to clean your cat's litter box while you are pregnant  · Ask your healthcare provider about travel  The most comfortable time to travel is during the second trimester  Ask your healthcare provider if you can travel after 36 weeks  You may not be able to travel in an airplane after 36 weeks  He may also recommend that you avoid long road trips  What changes are happening with my baby? By 38 weeks, your baby may weigh between 6 and 9 pounds  Your baby may be about 14 inches long from the top of the head to the rump (baby's bottom)  Your baby hears well enough to know your voice  As your baby gets larger, you may feel fewer kicks and more stretching and rolling  Your baby may move into a head-down position  Your baby will also rest lower in your abdomen  What do I need to know about prenatal care?   Your healthcare provider will check your blood pressure and weight  You may also need the following:  · A urine test  may also be done to check for sugar and protein  These can be signs of gestational diabetes or infection  Protein in your urine may also be a sign of preeclampsia  Preeclampsia is a condition that can develop during week 20 or later of your pregnancy  It causes high blood pressure, and it can cause problems with your kidneys and other organs  · A blood test  may be done to check for anemia (low iron level)  · A Tdap vaccine  may be recommended by your healthcare provider  · A group B strep test  is a test that is done to check for group B strep infection  Group B strep is a type of bacteria that may be found in the vagina or rectum  It can be passed to your baby during delivery if you have it  Your healthcare provider will take swab your vagina or rectum and send the sample to the lab for tests  · Fundal height  is a measurement of your uterus to check your baby's growth  This number is usually the same as the number of weeks that you have been pregnant  Your healthcare provider may also check your baby's position  · Your baby's heart rate  will be checked  When should I seek immediate care? · You develop a severe headache that does not go away  · You have new or increased vision changes, such as blurred or spotted vision  · You have new or increased swelling in your face or hands  · You have vaginal spotting or bleeding  · Your water broke or you feel warm water gushing or trickling from your vagina  When should I contact my healthcare provider? · You have more than 5 contractions in 1 hour  · You notice any changes in your baby's movements  · You have abdominal cramps, pressure, or tightening  · You have a change in vaginal discharge  · You have chills or a fever  · You have vaginal itching, burning, or pain  · You have yellow, green, white, or foul-smelling vaginal discharge      · You have pain or burning when you urinate, less urine than usual, or pink or bloody urine  · You have questions or concerns about your condition or care  CARE AGREEMENT:   You have the right to help plan your care  Learn about your health condition and how it may be treated  Discuss treatment options with your caregivers to decide what care you want to receive  You always have the right to refuse treatment  The above information is an  only  It is not intended as medical advice for individual conditions or treatments  Talk to your doctor, nurse or pharmacist before following any medical regimen to see if it is safe and effective for you  © 2017 [address] [employer] Information is for End User's use only and may not be sold, redistributed or otherwise used for commercial purposes  All illustrations and images included in CareNotes® are the copyrighted property of A D A M , Inc  or [employer]

## 2019-01-25 NOTE — PROGRESS NOTES
L&D Triage Note - OB/GYN  Jack Watts 19 y o  female MRN: [id number]  Unit/Bed#: LD Triage  Encounter: [id number]      Assessment:  19 y o   at 43w3d who presents for rule out rupture of membranes    Plan:  1  Rule out rupture of membranes    - Negative work up   - VIVIANA: 8 7cm   - NST: reactive    2  Discharge home with labor precautions  Management discussed with Dr Mckenna Miller  ______________________________________________________________________      Chief Compliant: leakage of fluid     TIME: 10:00  Subjective: Barbara Verduzco is a 19 y o   at 43w3d who presents for rule out rupture of membranes  The patient states that last night, her underwear felt wet, so she put a pad on for a few hours and it was damp  She removed the pad at midnight and did not replace it  She also had dampness in her underwear this morning  She denies any "gush" of fluid  She is having intermittent cramping that feels like period cramps       Pregnancy complications: Renal tubular acidosis, obesity, chronic hypertension     Leakage of fluid: yes  Fetal movement: present  Vaginal bleeding: none  Contractions: intermittent cramping     Objective:  Vitals:    19 0928   BP: 125/61   Pulse: 103   Resp: 20   Temp: 98 6 °F (37 °C)   SpO2: 97%       Physical Exam     SVE: closed   FHT:  150 / Moderate 6 - 25 bpm / accelerations, no decelerations  [place]: irritability   Nitrazine: negative  Pooling: negative  [place]: negative  Vagina: white discharge           Catarino Mcmahan MD 2019 11:02 AM

## 2019-01-26 ENCOUNTER — APPOINTMENT (OUTPATIENT)
Dept: LAB | Facility: HOSPITAL | Age: 19
End: 2019-01-26
Payer: COMMERCIAL

## 2019-01-26 DIAGNOSIS — R53.83 LETHARGY: ICD-10-CM

## 2019-01-26 LAB
ANION GAP SERPL CALCULATED.3IONS-SCNC: 5 MMOL/L (ref 4–13)
BUN SERPL-MCNC: 9 MG/DL (ref 5–25)
CALCIUM SERPL-MCNC: 8.5 MG/DL (ref 8.3–10.1)
CHLORIDE SERPL-SCNC: 108 MMOL/L (ref 100–108)
CO2 SERPL-SCNC: 22 MMOL/L (ref 21–32)
CREAT SERPL-MCNC: 0.58 MG/DL (ref 0.6–1.3)
GFR SERPL CREATININE-BSD FRML MDRD: 133 ML/MIN/1.73SQ M
GLUCOSE SERPL-MCNC: 121 MG/DL (ref 65–140)
POTASSIUM SERPL-SCNC: 4.6 MMOL/L (ref 3.5–5.3)
SODIUM SERPL-SCNC: 135 MMOL/L (ref 136–145)

## 2019-01-26 PROCEDURE — 36415 COLL VENOUS BLD VENIPUNCTURE: CPT

## 2019-01-26 PROCEDURE — 80048 BASIC METABOLIC PNL TOTAL CA: CPT

## 2019-01-28 ENCOUNTER — ULTRASOUND (OUTPATIENT)
Dept: PERINATAL CARE | Facility: CLINIC | Age: 19
End: 2019-01-28
Payer: COMMERCIAL

## 2019-01-28 ENCOUNTER — ROUTINE PRENATAL (OUTPATIENT)
Dept: OBGYN CLINIC | Facility: CLINIC | Age: 19
End: 2019-01-28

## 2019-01-28 VITALS
HEART RATE: 111 BPM | SYSTOLIC BLOOD PRESSURE: 130 MMHG | HEIGHT: 62 IN | BODY MASS INDEX: 43.76 KG/M2 | DIASTOLIC BLOOD PRESSURE: 78 MMHG | WEIGHT: 237.8 LBS

## 2019-01-28 VITALS — SYSTOLIC BLOOD PRESSURE: 130 MMHG | WEIGHT: 237.2 LBS | DIASTOLIC BLOOD PRESSURE: 84 MMHG | BODY MASS INDEX: 43.38 KG/M2

## 2019-01-28 DIAGNOSIS — Z3A.36 36 WEEKS GESTATION OF PREGNANCY: ICD-10-CM

## 2019-01-28 DIAGNOSIS — Z34.83 PRENATAL CARE, SUBSEQUENT PREGNANCY, THIRD TRIMESTER: Primary | ICD-10-CM

## 2019-01-28 DIAGNOSIS — O10.413: Primary | ICD-10-CM

## 2019-01-28 DIAGNOSIS — O99.213 MATERNAL MORBID OBESITY IN THIRD TRIMESTER, ANTEPARTUM (HCC): ICD-10-CM

## 2019-01-28 DIAGNOSIS — Z36.89 ENCOUNTER FOR ULTRASOUND TO CHECK FETAL GROWTH: ICD-10-CM

## 2019-01-28 DIAGNOSIS — E66.01 MATERNAL MORBID OBESITY IN THIRD TRIMESTER, ANTEPARTUM (HCC): ICD-10-CM

## 2019-01-28 DIAGNOSIS — I15.1: Primary | ICD-10-CM

## 2019-01-28 PROCEDURE — 76816 OB US FOLLOW-UP PER FETUS: CPT | Performed by: OBSTETRICS & GYNECOLOGY

## 2019-01-28 PROCEDURE — 76818 FETAL BIOPHYS PROFILE W/NST: CPT | Performed by: OBSTETRICS & GYNECOLOGY

## 2019-01-28 PROCEDURE — 99212 OFFICE O/P EST SF 10 MIN: CPT | Performed by: OBSTETRICS & GYNECOLOGY

## 2019-01-28 PROCEDURE — PNV: Performed by: OBSTETRICS & GYNECOLOGY

## 2019-01-28 NOTE — PROGRESS NOTES
[address]: Ms Hein Craig was seen today at 36w6d for NST (found under the pregnancy episode) which I reviewed the RN assessment and agree, and fetal growth ultrasound (see ultrasound report under OB procedures tab)  Please don't hesitate to contact our office with any concerns or questions    Nessa Leach MD

## 2019-01-31 ENCOUNTER — ROUTINE PRENATAL (OUTPATIENT)
Dept: PERINATAL CARE | Facility: CLINIC | Age: 19
End: 2019-01-31
Payer: COMMERCIAL

## 2019-01-31 VITALS
BODY MASS INDEX: 43.69 KG/M2 | WEIGHT: 237.4 LBS | DIASTOLIC BLOOD PRESSURE: 81 MMHG | SYSTOLIC BLOOD PRESSURE: 136 MMHG | HEIGHT: 62 IN | HEART RATE: 111 BPM

## 2019-01-31 DIAGNOSIS — O10.919 CHRONIC HYPERTENSION AFFECTING PREGNANCY: ICD-10-CM

## 2019-01-31 DIAGNOSIS — E66.01 MATERNAL MORBID OBESITY IN THIRD TRIMESTER, ANTEPARTUM (HCC): ICD-10-CM

## 2019-01-31 DIAGNOSIS — Z3A.37 37 WEEKS GESTATION OF PREGNANCY: Primary | ICD-10-CM

## 2019-01-31 DIAGNOSIS — O99.213 MATERNAL MORBID OBESITY IN THIRD TRIMESTER, ANTEPARTUM (HCC): ICD-10-CM

## 2019-01-31 PROCEDURE — 59025 FETAL NON-STRESS TEST: CPT | Performed by: OBSTETRICS & GYNECOLOGY

## 2019-01-31 NOTE — PROGRESS NOTES
[address] [address] [facility] [address]: Ms Chloe Goodpasture was seen today at 37w2d for NST (found under the pregnancy episode) which I reviewed the RN assessment and agree  Please don't hesitate to contact our office with any concerns or questions    Griselda Lu, MD

## 2019-02-04 ENCOUNTER — HOSPITAL ENCOUNTER (INPATIENT)
Facility: HOSPITAL | Age: 19
LOS: 4 days | Discharge: HOME | End: 2019-02-08
Attending: OBSTETRICS & GYNECOLOGY | Admitting: OBSTETRICS & GYNECOLOGY
Payer: COMMERCIAL

## 2019-02-04 ENCOUNTER — ROUTINE PRENATAL (OUTPATIENT)
Dept: PERINATAL CARE | Facility: CLINIC | Age: 19
End: 2019-02-04
Payer: COMMERCIAL

## 2019-02-04 ENCOUNTER — HOSPITAL ENCOUNTER (OUTPATIENT)
Dept: LABOR AND DELIVERY | Facility: HOSPITAL | Age: 19
Discharge: HOME | End: 2019-02-04
Payer: COMMERCIAL

## 2019-02-04 ENCOUNTER — ROUTINE PRENATAL (OUTPATIENT)
Dept: OBGYN CLINIC | Facility: CLINIC | Age: 19
End: 2019-02-04

## 2019-02-04 VITALS — BODY MASS INDEX: 44.15 KG/M2 | DIASTOLIC BLOOD PRESSURE: 70 MMHG | WEIGHT: 241.4 LBS | SYSTOLIC BLOOD PRESSURE: 168 MMHG

## 2019-02-04 VITALS
BODY MASS INDEX: 44.53 KG/M2 | HEART RATE: 99 BPM | SYSTOLIC BLOOD PRESSURE: 137 MMHG | WEIGHT: 242 LBS | DIASTOLIC BLOOD PRESSURE: 80 MMHG | HEIGHT: 62 IN

## 2019-02-04 DIAGNOSIS — O10.919 CHRONIC HYPERTENSION AFFECTING PREGNANCY: Primary | ICD-10-CM

## 2019-02-04 DIAGNOSIS — R52 PAIN: Primary | ICD-10-CM

## 2019-02-04 DIAGNOSIS — Z34.83 PRENATAL CARE, SUBSEQUENT PREGNANCY, THIRD TRIMESTER: Primary | ICD-10-CM

## 2019-02-04 DIAGNOSIS — Z3A.37 37 WEEKS GESTATION OF PREGNANCY: ICD-10-CM

## 2019-02-04 DIAGNOSIS — K59.00 CONSTIPATION, UNSPECIFIED CONSTIPATION TYPE: ICD-10-CM

## 2019-02-04 LAB
ABO GROUP BLD: NORMAL
ALBUMIN SERPL BCP-MCNC: 2.7 G/DL (ref 3.5–5)
ALP SERPL-CCNC: 133 U/L (ref 46–116)
ALT SERPL W P-5'-P-CCNC: 12 U/L (ref 12–78)
ANION GAP SERPL CALCULATED.3IONS-SCNC: 10 MMOL/L (ref 4–13)
AST SERPL W P-5'-P-CCNC: 16 U/L (ref 5–45)
BILIRUB SERPL-MCNC: 0.11 MG/DL (ref 0.2–1)
BLD GP AB SCN SERPL QL: NEGATIVE
BUN SERPL-MCNC: 9 MG/DL (ref 5–25)
CALCIUM SERPL-MCNC: 9.1 MG/DL (ref 8.3–10.1)
CHLORIDE SERPL-SCNC: 109 MMOL/L (ref 100–108)
CO2 SERPL-SCNC: 17 MMOL/L (ref 21–32)
CREAT SERPL-MCNC: 0.64 MG/DL (ref 0.6–1.3)
CREAT UR-MCNC: 39.4 MG/DL
ERYTHROCYTE [DISTWIDTH] IN BLOOD BY AUTOMATED COUNT: 14.7 % (ref 11.6–15.1)
GFR SERPL CREATININE-BSD FRML MDRD: 129 ML/MIN/1.73SQ M
GLUCOSE SERPL-MCNC: 104 MG/DL (ref 65–140)
HCT VFR BLD AUTO: 36.2 % (ref 34.8–46.1)
HGB BLD-MCNC: 11 G/DL (ref 11.5–15.4)
MCH RBC QN AUTO: 26.3 PG (ref 26.8–34.3)
MCHC RBC AUTO-ENTMCNC: 30.4 G/DL (ref 31.4–37.4)
MCV RBC AUTO: 87 FL (ref 82–98)
PLATELET # BLD AUTO: 212 THOUSANDS/UL (ref 149–390)
PMV BLD AUTO: 10.8 FL (ref 8.9–12.7)
POTASSIUM SERPL-SCNC: 5 MMOL/L (ref 3.5–5.3)
PROT SERPL-MCNC: 6.8 G/DL (ref 6.4–8.2)
PROT UR-MCNC: <6 MG/DL
PROT/CREAT UR: <0.15 MG/G{CREAT} (ref 0–0.1)
RBC # BLD AUTO: 4.18 MILLION/UL (ref 3.81–5.12)
RH BLD: POSITIVE
SODIUM SERPL-SCNC: 136 MMOL/L (ref 136–145)
SPECIMEN EXPIRATION DATE: NORMAL
WBC # BLD AUTO: 13.15 THOUSAND/UL (ref 4.31–10.16)

## 2019-02-04 PROCEDURE — 4A1HXCZ MONITORING OF PRODUCTS OF CONCEPTION, CARDIAC RATE, EXTERNAL APPROACH: ICD-10-PCS | Performed by: OBSTETRICS & GYNECOLOGY

## 2019-02-04 PROCEDURE — 86592 SYPHILIS TEST NON-TREP QUAL: CPT | Performed by: OBSTETRICS & GYNECOLOGY

## 2019-02-04 PROCEDURE — 59025 FETAL NON-STRESS TEST: CPT | Performed by: OBSTETRICS & GYNECOLOGY

## 2019-02-04 PROCEDURE — 82570 ASSAY OF URINE CREATININE: CPT | Performed by: OBSTETRICS & GYNECOLOGY

## 2019-02-04 PROCEDURE — 76815 OB US LIMITED FETUS(S): CPT | Performed by: OBSTETRICS & GYNECOLOGY

## 2019-02-04 PROCEDURE — 84156 ASSAY OF PROTEIN URINE: CPT | Performed by: OBSTETRICS & GYNECOLOGY

## 2019-02-04 PROCEDURE — PNV: Performed by: OBSTETRICS & GYNECOLOGY

## 2019-02-04 PROCEDURE — 3E0P7VZ INTRODUCTION OF HORMONE INTO FEMALE REPRODUCTIVE, VIA NATURAL OR ARTIFICIAL OPENING: ICD-10-PCS | Performed by: OBSTETRICS & GYNECOLOGY

## 2019-02-04 PROCEDURE — 86900 BLOOD TYPING SEROLOGIC ABO: CPT | Performed by: OBSTETRICS & GYNECOLOGY

## 2019-02-04 PROCEDURE — 85027 COMPLETE CBC AUTOMATED: CPT | Performed by: OBSTETRICS & GYNECOLOGY

## 2019-02-04 PROCEDURE — 80053 COMPREHEN METABOLIC PANEL: CPT | Performed by: OBSTETRICS & GYNECOLOGY

## 2019-02-04 PROCEDURE — 86850 RBC ANTIBODY SCREEN: CPT | Performed by: OBSTETRICS & GYNECOLOGY

## 2019-02-04 PROCEDURE — 0DQR0ZZ REPAIR ANAL SPHINCTER, OPEN APPROACH: ICD-10-PCS | Performed by: OBSTETRICS & GYNECOLOGY

## 2019-02-04 PROCEDURE — 86901 BLOOD TYPING SEROLOGIC RH(D): CPT | Performed by: OBSTETRICS & GYNECOLOGY

## 2019-02-04 RX ORDER — SODIUM CHLORIDE, SODIUM LACTATE, POTASSIUM CHLORIDE, CALCIUM CHLORIDE 600; 310; 30; 20 MG/100ML; MG/100ML; MG/100ML; MG/100ML
125 INJECTION, SOLUTION INTRAVENOUS CONTINUOUS
Status: DISCONTINUED | OUTPATIENT
Start: 2019-02-04 | End: 2019-02-06

## 2019-02-04 RX ORDER — ONDANSETRON 2 MG/ML
4 INJECTION INTRAMUSCULAR; INTRAVENOUS EVERY 8 HOURS PRN
Status: DISCONTINUED | OUTPATIENT
Start: 2019-02-04 | End: 2019-02-06

## 2019-02-04 RX ADMIN — MISOPROSTOL 25 MCG: 100 TABLET ORAL at 21:12

## 2019-02-04 NOTE — PATIENT INSTRUCTIONS
The patient agrees the plan for cervical ripening tonight with Pitocin tomorrow will continue to monitor her blood pressure

## 2019-02-04 NOTE — PROGRESS NOTES
[address] [facility] [address]: Ms Jordan Madrigal was seen today at 37w6d for NST (found under the pregnancy episode) which I reviewed the RN assessment and agree, and VIVIANA (see ultrasound report under OB procedures tab)  Please don't hesitate to contact our office with any concerns or questions    Rochelle De León MD

## 2019-02-04 NOTE — PROGRESS NOTES
Unstable blood pressures, last blood pressure 160/70  She has appointment to return fetal medicine today  I have made arrangements for cervical ripening with Cytotec and induction tomorrow with Pitocin  The patient is aware increased risk for  section  Will continue to monitor her blood pressure

## 2019-02-04 NOTE — LETTER
NST sleeve cover sheet    Patient name: Shayan Byrd  : 2000  MRN: [id number]    CHRIS: Estimated Date of Delivery: 19    Obstetrician: _______________________________    Reason(s) for testing:  __________________________________________      Testing frequency:    ___ 2x/wk  ___ 1x/wk  ___ Dopplers  ___ BPP?       Last growth scan: __________________________________________

## 2019-02-05 ENCOUNTER — ANESTHESIA EVENT (INPATIENT)
Dept: LABOR AND DELIVERY | Facility: HOSPITAL | Age: 19
End: 2019-02-05
Payer: COMMERCIAL

## 2019-02-05 ENCOUNTER — ANESTHESIA (INPATIENT)
Dept: LABOR AND DELIVERY | Facility: HOSPITAL | Age: 19
End: 2019-02-05
Payer: COMMERCIAL

## 2019-02-05 LAB — RPR SER QL: NORMAL

## 2019-02-05 PROCEDURE — 10H07YZ INSERTION OF OTHER DEVICE INTO PRODUCTS OF CONCEPTION, VIA NATURAL OR ARTIFICIAL OPENING: ICD-10-PCS | Performed by: OBSTETRICS & GYNECOLOGY

## 2019-02-05 PROCEDURE — 4A1H7CZ MONITORING OF PRODUCTS OF CONCEPTION, CARDIAC RATE, VIA NATURAL OR ARTIFICIAL OPENING: ICD-10-PCS | Performed by: OBSTETRICS & GYNECOLOGY

## 2019-02-05 RX ORDER — PROMETHAZINE HYDROCHLORIDE 25 MG/ML
12.5 INJECTION, SOLUTION INTRAMUSCULAR; INTRAVENOUS ONCE
Status: COMPLETED | OUTPATIENT
Start: 2019-02-05 | End: 2019-02-05

## 2019-02-05 RX ORDER — HYDROCHLOROTHIAZIDE 12.5 MG/1
12.5 TABLET ORAL
Status: DISCONTINUED | OUTPATIENT
Start: 2019-02-05 | End: 2019-02-07

## 2019-02-05 RX ORDER — ACETAMINOPHEN 325 MG/1
650 TABLET ORAL EVERY 6 HOURS PRN
Status: DISCONTINUED | OUTPATIENT
Start: 2019-02-05 | End: 2019-02-06

## 2019-02-05 RX ORDER — OXYTOCIN/RINGER'S LACTATE 30/500 ML
1-30 PLASTIC BAG, INJECTION (ML) INTRAVENOUS CONTINUOUS
Status: DISCONTINUED | OUTPATIENT
Start: 2019-02-05 | End: 2019-02-08 | Stop reason: HOSPADM

## 2019-02-05 RX ORDER — BUTORPHANOL TARTRATE 1 MG/ML
2 INJECTION, SOLUTION INTRAMUSCULAR; INTRAVENOUS ONCE
Status: DISCONTINUED | OUTPATIENT
Start: 2019-02-05 | End: 2019-02-05

## 2019-02-05 RX ORDER — ACETAMINOPHEN 325 MG/1
650 TABLET ORAL EVERY 6 HOURS PRN
Status: DISCONTINUED | OUTPATIENT
Start: 2019-02-05 | End: 2019-02-05

## 2019-02-05 RX ORDER — BUTORPHANOL TARTRATE 1 MG/ML
1 INJECTION, SOLUTION INTRAMUSCULAR; INTRAVENOUS ONCE
Status: COMPLETED | OUTPATIENT
Start: 2019-02-05 | End: 2019-02-05

## 2019-02-05 RX ADMIN — ROPIVACAINE HYDROCHLORIDE: 2 INJECTION, SOLUTION EPIDURAL; INFILTRATION at 16:15

## 2019-02-05 RX ADMIN — SODIUM CHLORIDE, SODIUM LACTATE, POTASSIUM CHLORIDE, AND CALCIUM CHLORIDE 125 ML/HR: .6; .31; .03; .02 INJECTION, SOLUTION INTRAVENOUS at 01:25

## 2019-02-05 RX ADMIN — SODIUM CHLORIDE, SODIUM LACTATE, POTASSIUM CHLORIDE, AND CALCIUM CHLORIDE 125 ML/HR: .6; .31; .03; .02 INJECTION, SOLUTION INTRAVENOUS at 16:41

## 2019-02-05 RX ADMIN — Medication 2 MILLI-UNITS/MIN: at 01:24

## 2019-02-05 RX ADMIN — BUTORPHANOL TARTRATE 1 MG: 1 INJECTION, SOLUTION INTRAMUSCULAR; INTRAVENOUS at 03:24

## 2019-02-05 RX ADMIN — HYDROCHLOROTHIAZIDE 12.5 MG: 12.5 TABLET ORAL at 21:06

## 2019-02-05 RX ADMIN — PROMETHAZINE HYDROCHLORIDE 12.5 MG: 25 INJECTION INTRAMUSCULAR; INTRAVENOUS at 03:25

## 2019-02-05 RX ADMIN — SODIUM CHLORIDE, SODIUM LACTATE, POTASSIUM CHLORIDE, AND CALCIUM CHLORIDE 125 ML/HR: .6; .31; .03; .02 INJECTION, SOLUTION INTRAVENOUS at 19:29

## 2019-02-05 RX ADMIN — ROPIVACAINE HYDROCHLORIDE: 2 INJECTION, SOLUTION EPIDURAL; INFILTRATION at 23:11

## 2019-02-05 RX ADMIN — SODIUM CHLORIDE, SODIUM LACTATE, POTASSIUM CHLORIDE, AND CALCIUM CHLORIDE 125 ML/HR: .6; .31; .03; .02 INJECTION, SOLUTION INTRAVENOUS at 09:20

## 2019-02-05 RX ADMIN — ACETAMINOPHEN 650 MG: 325 TABLET, FILM COATED ORAL at 23:44

## 2019-02-05 NOTE — OB LABOR/OXYTOCIN SAFETY PROGRESS
Oxytocin Safety Progress Check Note - Merlinda Blizzard 19 y o  female MRN: [id number]    Unit/Bed#: -01 Encounter: [id number]    Obstetric History       T0      L0     SAB0   TAB0   Ectopic0   Multiple0   Live Births0      Gestational Age: 38w0d  Dose (essence-units/min) Oxytocin: 18 essence-units/min  Contraction Frequency (minutes): 2-4  Contraction Quality: Mild  Tachysystole: No   Dilation: 2        Effacement (%): 80  Station: -3  Baseline Rate: 150 bpm  Fetal Heart Rate: 146 BPM  FHR Category: Category I          Notes/comments:     Patient is feeling more intense contractions  She has made slight change  She is now 2/80/-3  Pitocin titration: 18 essence-units/min  SVE: 2/80/-3  FHT: Category 1: 140/moderate/accelerations, no decelerations   [place]: q2-4 minutes  Plan: Continue pitocin titration  Consider AROM at next check               Mary Pathak MD 2019 12:58 PM

## 2019-02-05 NOTE — OB LABOR/OXYTOCIN SAFETY PROGRESS
Labor Progress Note - John Mahmoodocent 19 y o  female MRN: [id number]    Unit/Bed#: LD Triage  Encounter: [id number]    Obstetric History       T0      L0     SAB0   TAB0   Ectopic0   Multiple0   Live Births0      Gestational Age: 38w0d  Dose (essence-units/min) Oxytocin: 2 essence-units/min  Contraction Frequency (minutes): 1 5-4 5  Contraction Quality: Mild, Moderate      Dilation: 1        Effacement (%): 50  Station: -3  Baseline Rate: 135 bpm  Fetal Heart Rate: 142 BPM  FHR Category: Category I          Notes/comments:   Patient reports that she is uncomfortable with severe cramping  Contractions are every 2-3 minutes  Category 1 strip  Patient currently on 2 milliunits per minute of Pitocin  Will continue straight dose Pitocin at this time  Stadol and Phenergan ordered for patient comfort  Discussed with Dr Pawel Lloyd MD 2019 3:06 AM

## 2019-02-05 NOTE — PLAN OF CARE
BIRTH - VAGINAL/ SECTION     Fetal and maternal status remain reassuring during the birth process [de-identified]     Emotionally satisfying birthing experience for mother/fetus [address] Discharge to home or other facility with appropriate resources Progressing        INFECTION - ADULT     Absence or prevention of progression during hospitalization Progressing     Absence of fever/infection during neutropenic period Progressing        Knowledge Deficit     Patient/family/caregiver demonstrates understanding of disease process, treatment plan, medications, and discharge instructions Progressing        PAIN - ADULT     Verbalizes/displays adequate comfort level or baseline comfort level Progressing        Potential for Falls     Patient will remain free of falls Progressing        SAFETY ADULT     Maintain or return to baseline ADL function Progressing     Maintain or return mobility status to optimal level Progressing

## 2019-02-05 NOTE — OB LABOR/OXYTOCIN SAFETY PROGRESS
Oxytocin Safety Progress Check Note - Speedy Frias 19 y o  female MRN: [id number]    Unit/Bed#: LD Triage  Encounter: [id number]    Obstetric History       T0      L0     SAB0   TAB0   Ectopic0   Multiple0   Live Births0      Gestational Age: 38w0d  Dose (essence-units/min) Oxytocin: 2 essence-units/min  Contraction Frequency (minutes): 2-6  Contraction Quality: Mild, Moderate      Dilation: 1        Effacement (%): 50  Station: -3  Baseline Rate: 140 bpm  Fetal Heart Rate: 138 BPM  FHR Category: Category I          Notes/comments:   Patient is resting comfortably s/p Stadol and phenergan  Will reassess cervical change after patient awakes  Will continue to monitor             Arvin Jones MD 2019 5:19 AM

## 2019-02-05 NOTE — OB LABOR/OXYTOCIN SAFETY PROGRESS
Oxytocin Safety Progress Check Note - Merlinda Blizzard 19 y o  female MRN: [id number]    Unit/Bed#: -01 Encounter: [id number]    Obstetric History       T0      L0     SAB0   TAB0   Ectopic0   Multiple0   Live Births0      Gestational Age: 38w0d  Dose (essence-units/min) Oxytocin: 12 essence-units/min  Contraction Frequency (minutes): 4-5  Contraction Quality: Mild  Tachysystole: No   Dilation: 1-2        Effacement (%): 80  Station: -3  Baseline Rate: 150 bpm  Fetal Heart Rate: 147 BPM  FHR Category: Category I          Notes/comments:     Patient appears comfortable  Her cervical exam is unchanged  Her cervix is 1-2/80/-3  Will continue with pitocin titration    Pit: 12 essence-units/min  SVE: 1 5/80/-3  FHT: Category 1: 150/moderate/acceleratoins, no decelerations   [place]: q4-5 minutes   Plan: Continue pitocin titration            Mary Pathak MD 2019 10:57 AM

## 2019-02-05 NOTE — OB LABOR/OXYTOCIN SAFETY PROGRESS
Labor Progress Note - Myrtle De La O 19 y o  female MRN: [id number]    Unit/Bed#:  Triage  Encounter: [id number]    Obstetric History       T0      L0     SAB0   TAB0   Ectopic0   Multiple0   Live Births0      Gestational Age: 41w10d               Dilation: Closed        Effacement (%): 30  Station: -3     Fetal Heart Rate: 153 BPM             Notes/comments:   Cytotec placed  Will continue to monitor and reassess in 3-4 hours unless otherwise indicated               Willis Dewitt MD 2019 9:15 PM

## 2019-02-05 NOTE — ANESTHESIA PROCEDURE NOTES
Epidural Block    Patient location during procedure: OB  Start time: 2/5/2019 4:07 PM  Reason for block: procedure for pain and at surgeon's request  Staffing  Anesthesiologist: Zane Mcfarland  Performed: anesthesiologist   Preanesthetic Checklist  Completed: patient identified, site marked, surgical consent, pre-op evaluation, timeout performed, IV checked, risks and benefits discussed and monitors and equipment checked  Epidural  Patient position: sitting  Prep: ChloraPrep  Patient monitoring: cardiac monitor and frequent blood pressure checks  Approach: midline  Location: lumbar (1-5)  Injection technique: AMI saline  Needle  Needle type: Tuohy   Needle gauge: 18 G  Catheter type: side hole  Catheter size: 20 G  Test dose: negativenegative aspiration for CSF, negative aspiration for heme and no paresthesia on injection  patient tolerated the procedure well with no immediate complications  Additional Notes  One attempt, easy

## 2019-02-05 NOTE — OB LABOR/OXYTOCIN SAFETY PROGRESS
Oxytocin Safety Progress Check Note - Minerva Kaur 19 y o  female MRN: [id number]    Unit/Bed#: LD Triage 1- Encounter: [id number]    Obstetric History       T0      L0     SAB0   TAB0   Ectopic0   Multiple0   Live Births0      Gestational Age: 38w0d  Dose (essence-units/min) Oxytocin: 2 essence-units/min  Contraction Frequency (minutes): 2-6  Contraction Quality: Mild, Moderate      Dilation: 1-2        Effacement (%): 50  Station: -3  Baseline Rate: 140 bpm  Fetal Heart Rate: 140 BPM  FHR Category: Category I          Notes/comments:   Patient was out of bed to restroom  Patient is contractions every 2-3 minutes  Category 1 strip  Will continue to monitor and reassess in 2 hr               Janett Khan MD 2019 5:25 AM

## 2019-02-05 NOTE — OB LABOR/OXYTOCIN SAFETY PROGRESS
Labor Progress Note - Myrtle De La O 19 y o  female MRN: [id number]    Unit/Bed#:  Triage  Encounter: [id number]    Obstetric History       T0      L0     SAB0   TAB0   Ectopic0   Multiple0   Live Births0      Gestational Age: 38w0d               Dilation: 1        Effacement (%): 48  Station: -3     Fetal Heart Rate: 168 BPM             Notes/comments:   Patient reports cramping s/p 1 dose of cytotec  Contractions every 3-3 5 minutes on the monitor  Category 1 strip  Will begin SD pitocin at 2 milliunits per minute 4 hours after initial cytotec dose placement  Discussed with Dr Alfred Dewitt MD 2019 12:13 AM

## 2019-02-05 NOTE — OB LABOR/OXYTOCIN SAFETY PROGRESS
Oxytocin Safety Progress Check Note - Beatrice Pineda 19 y o  female MRN: [id number]    Unit/Bed#: -01 Encounter: [id number]    Obstetric History       T0      L0     SAB0   TAB0   Ectopic0   Multiple0   Live Births0      Gestational Age: 38w0d  Dose (essence-units/min) Oxytocin: 20 essence-units/min  Contraction Frequency (minutes): 2-3  Contraction Quality: Mild  Tachysystole: No   Dilation: 2        Effacement (%): 80  Station: -3  Baseline Rate: 135 bpm  Fetal Heart Rate: 140 BPM  FHR Category: Category II          Notes/comments:     Patient is extremely comfortable with her epidural in place  Cervical exam is unchanged  Segura balloon was placed and is currently weighted with a saline bag of fluid  PROCEDURE:  SGEURA BALLOON PLACEMENT    A 24F segura with a 30cc balloon was selected, SVE was performed and cervix was located, segura was introduced over sterile gloved hands  Balloon advanced through cervix beyond the internal cervical os  A small amount amount of sterile saline solution was instilled in the balloon to confirm placement  Placement was confirmed to be beyond the internal cervical os  A total of 60cc of sterile saline solution was placed into the balloon  Pt tolerated well  Instructions left with RN to place segura to gravity with a 1L bag of IV fluid  Notify MD when segura dislodged              Sarah Hyatt MD 2019 5:16 PM

## 2019-02-05 NOTE — ANESTHESIA PREPROCEDURE EVALUATION
Review of Systems/Medical History  Patient summary reviewed  Chart reviewed  No history of anesthetic complications     Cardiovascular  Negative cardio ROS Hypertension (secondary to renal disease) ,    Pulmonary  Asthma ,        GI/Hepatic       Kidney stones,        Endo/Other    Obesity  morbid obesity   GYN       Hematology  Anemia ,     Musculoskeletal       Neurology    Headaches,    Psychology           Physical Exam    Airway    Mallampati score: II  TM Distance: >3 FB  Neck ROM: full     Dental       Cardiovascular  Comment: Negative ROS,     Pulmonary      Other Findings        Anesthesia Plan  ASA Score- 2     Anesthesia Type- epidural with ASA Monitors  Additional Monitors:   Airway Plan:         Plan Factors-    Induction-     Postoperative Plan-     Informed Consent- Anesthetic plan and risks discussed with patient

## 2019-02-06 PROBLEM — Z87.59 STATUS POST VACUUM-ASSISTED VAGINAL DELIVERY: Status: ACTIVE | Noted: 2019-02-06

## 2019-02-06 LAB
BASE EXCESS BLDCOV CALC-SCNC: -8.2 MMOL/L (ref 1–9)
HCO3 BLDCOV-SCNC: 17.7 MMOL/L (ref 12.2–28.6)
OXYHGB MFR BLDCOV: 69.6 %
PCO2 BLDCOV: 38.1 MM HG (ref 27–43)
PH BLDCOV: 7.29 [PH] (ref 7.19–7.49)
PO2 BLDCOV: 30 MM HG (ref 15–45)
SAO2 % BLDCOV: 16.1 ML/DL

## 2019-02-06 PROCEDURE — 82805 BLOOD GASES W/O2 SATURATION: CPT | Performed by: OBSTETRICS & GYNECOLOGY

## 2019-02-06 PROCEDURE — 59400 OBSTETRICAL CARE: CPT | Performed by: OBSTETRICS & GYNECOLOGY

## 2019-02-06 RX ORDER — LIDOCAINE HYDROCHLORIDE 10 MG/ML
INJECTION, SOLUTION EPIDURAL; INFILTRATION; INTRACAUDAL; PERINEURAL
Status: COMPLETED
Start: 2019-02-06 | End: 2019-02-06

## 2019-02-06 RX ORDER — MAGNESIUM HYDROXIDE/ALUMINUM HYDROXICE/SIMETHICONE 120; 1200; 1200 MG/30ML; MG/30ML; MG/30ML
15 SUSPENSION ORAL EVERY 6 HOURS PRN
Status: DISCONTINUED | OUTPATIENT
Start: 2019-02-06 | End: 2019-02-08 | Stop reason: HOSPADM

## 2019-02-06 RX ORDER — CARBOPROST TROMETHAMINE 250 UG/ML
250 INJECTION, SOLUTION INTRAMUSCULAR ONCE
Status: DISCONTINUED | OUTPATIENT
Start: 2019-02-06 | End: 2019-02-08 | Stop reason: HOSPADM

## 2019-02-06 RX ORDER — CALCIUM CARBONATE 200(500)MG
1000 TABLET,CHEWABLE ORAL DAILY PRN
Status: DISCONTINUED | OUTPATIENT
Start: 2019-02-06 | End: 2019-02-08 | Stop reason: HOSPADM

## 2019-02-06 RX ORDER — DOCUSATE SODIUM 100 MG/1
100 CAPSULE, LIQUID FILLED ORAL 2 TIMES DAILY
Status: DISCONTINUED | OUTPATIENT
Start: 2019-02-06 | End: 2019-02-08 | Stop reason: HOSPADM

## 2019-02-06 RX ORDER — ONDANSETRON 2 MG/ML
4 INJECTION INTRAMUSCULAR; INTRAVENOUS EVERY 8 HOURS PRN
Status: DISCONTINUED | OUTPATIENT
Start: 2019-02-06 | End: 2019-02-08 | Stop reason: HOSPADM

## 2019-02-06 RX ORDER — SENNOSIDES 8.6 MG
1 TABLET ORAL
Status: DISCONTINUED | OUTPATIENT
Start: 2019-02-06 | End: 2019-02-08 | Stop reason: HOSPADM

## 2019-02-06 RX ORDER — BISACODYL 10 MG
10 SUPPOSITORY, RECTAL RECTAL DAILY PRN
Status: DISCONTINUED | OUTPATIENT
Start: 2019-02-06 | End: 2019-02-08 | Stop reason: HOSPADM

## 2019-02-06 RX ORDER — MISOPROSTOL 100 UG/1
600 TABLET ORAL ONCE
Status: COMPLETED | OUTPATIENT
Start: 2019-02-06 | End: 2019-02-06

## 2019-02-06 RX ORDER — CARBOPROST TROMETHAMINE 250 UG/ML
INJECTION, SOLUTION INTRAMUSCULAR
Status: COMPLETED
Start: 2019-02-06 | End: 2019-02-06

## 2019-02-06 RX ORDER — TRISODIUM CITRATE DIHYDRATE AND CITRIC ACID MONOHYDRATE 500; 334 MG/5ML; MG/5ML
30 SOLUTION ORAL 4 TIMES DAILY PRN
Status: DISCONTINUED | OUTPATIENT
Start: 2019-02-06 | End: 2019-02-08 | Stop reason: HOSPADM

## 2019-02-06 RX ORDER — OXYCODONE HYDROCHLORIDE AND ACETAMINOPHEN 5; 325 MG/1; MG/1
1 TABLET ORAL EVERY 4 HOURS PRN
Status: DISCONTINUED | OUTPATIENT
Start: 2019-02-06 | End: 2019-02-08 | Stop reason: HOSPADM

## 2019-02-06 RX ORDER — ACETAMINOPHEN 325 MG/1
650 TABLET ORAL EVERY 6 HOURS PRN
Status: DISCONTINUED | OUTPATIENT
Start: 2019-02-06 | End: 2019-02-08 | Stop reason: HOSPADM

## 2019-02-06 RX ORDER — OXYCODONE HYDROCHLORIDE AND ACETAMINOPHEN 5; 325 MG/1; MG/1
2 TABLET ORAL EVERY 4 HOURS PRN
Status: DISCONTINUED | OUTPATIENT
Start: 2019-02-06 | End: 2019-02-08 | Stop reason: HOSPADM

## 2019-02-06 RX ORDER — DIAPER,BRIEF,INFANT-TODD,DISP
1 EACH MISCELLANEOUS AS NEEDED
Status: DISCONTINUED | OUTPATIENT
Start: 2019-02-06 | End: 2019-02-08 | Stop reason: HOSPADM

## 2019-02-06 RX ORDER — SIMETHICONE 80 MG
80 TABLET,CHEWABLE ORAL 4 TIMES DAILY PRN
Status: DISCONTINUED | OUTPATIENT
Start: 2019-02-06 | End: 2019-02-08 | Stop reason: HOSPADM

## 2019-02-06 RX ORDER — METHYLERGONOVINE MALEATE 0.2 MG/ML
INJECTION INTRAVENOUS
Status: DISCONTINUED
Start: 2019-02-06 | End: 2019-02-06 | Stop reason: WASHOUT

## 2019-02-06 RX ORDER — CEFOXITIN SODIUM 1 G/50ML
1000 INJECTION, SOLUTION INTRAVENOUS ONCE
Status: COMPLETED | OUTPATIENT
Start: 2019-02-06 | End: 2019-02-06

## 2019-02-06 RX ORDER — IBUPROFEN 600 MG/1
600 TABLET ORAL EVERY 6 HOURS PRN
Status: DISCONTINUED | OUTPATIENT
Start: 2019-02-06 | End: 2019-02-08 | Stop reason: HOSPADM

## 2019-02-06 RX ORDER — DIPHENHYDRAMINE HCL 25 MG
25 TABLET ORAL EVERY 6 HOURS PRN
Status: DISCONTINUED | OUTPATIENT
Start: 2019-02-06 | End: 2019-02-08 | Stop reason: HOSPADM

## 2019-02-06 RX ADMIN — OXYCODONE HYDROCHLORIDE AND ACETAMINOPHEN 2 TABLET: 5; 325 TABLET ORAL at 14:04

## 2019-02-06 RX ADMIN — CEFOXITIN SODIUM 1000 MG: 1 POWDER, FOR SOLUTION INTRAVENOUS at 07:07

## 2019-02-06 RX ADMIN — DOCUSATE SODIUM 100 MG: 100 CAPSULE, LIQUID FILLED ORAL at 08:54

## 2019-02-06 RX ADMIN — LIDOCAINE HYDROCHLORIDE 300 MG: 10 INJECTION, SOLUTION EPIDURAL; INFILTRATION; INTRACAUDAL; PERINEURAL at 07:20

## 2019-02-06 RX ADMIN — BENZOCAINE AND LEVOMENTHOL: 200; 5 SPRAY TOPICAL at 11:04

## 2019-02-06 RX ADMIN — ONDANSETRON 4 MG: 2 INJECTION INTRAMUSCULAR; INTRAVENOUS at 00:00

## 2019-02-06 RX ADMIN — SODIUM CHLORIDE, SODIUM LACTATE, POTASSIUM CHLORIDE, AND CALCIUM CHLORIDE 125 ML/HR: .6; .31; .03; .02 INJECTION, SOLUTION INTRAVENOUS at 02:58

## 2019-02-06 RX ADMIN — CARBOPROST TROMETHAMINE 250 MCG: 250 INJECTION, SOLUTION INTRAMUSCULAR at 07:20

## 2019-02-06 RX ADMIN — DOCUSATE SODIUM 100 MG: 100 CAPSULE, LIQUID FILLED ORAL at 17:51

## 2019-02-06 RX ADMIN — MISOPROSTOL 600 MCG: 200 TABLET ORAL at 07:25

## 2019-02-06 RX ADMIN — ROPIVACAINE HYDROCHLORIDE: 2 INJECTION, SOLUTION EPIDURAL; INFILTRATION at 05:16

## 2019-02-06 RX ADMIN — Medication 30 MILLI-UNITS/MIN: at 06:34

## 2019-02-06 RX ADMIN — OXYCODONE HYDROCHLORIDE AND ACETAMINOPHEN 2 TABLET: 5; 325 TABLET ORAL at 20:16

## 2019-02-06 NOTE — DISCHARGE INSTRUCTIONS
Vaginal Delivery   WHAT YOU SHOULD KNOW:   A vaginal delivery is the birth of your baby through your vagina (birth canal)  AFTER YOU LEAVE:   Medicines:  · NSAIDs  help decrease swelling and pain or fever  This medicine is available with or without a doctor's order  NSAIDs can cause stomach bleeding or kidney problems in certain people  If you take blood thinner medicine, always ask your healthcare provider if NSAIDs are safe for you  Always read the medicine label and follow directions  · Take your medicine as directed  Call your healthcare provider if you think your medicine is not helping or if you have side effects  Tell him if you are allergic to any medicine  Keep a list of the medicines, vitamins, and herbs you take  Include the amounts, and when and why you take them  Bring the list or the pill bottles to follow-up visits  Carry your medicine list with you in case of an emergency  Follow up with your primary healthcare provider:  Most women need to return 6 weeks after a vaginal delivery  Ask about how to care for your wounds or stitches  Write down your questions so you remember to ask them during your visits  Activity:  Rest as much as possible  Try to keep all activities short  You may be able to do some exercise soon after you have your baby  Talk with your primary healthcare provider before you start exercising  If you work outside the home, ask when you can return to your job  Kegel exercises:  Kegel exercises may help your vaginal and rectal muscles heal faster  You can do Kegel exercises by tightening and relaxing the muscles around your vagina  Kegel exercises help make the muscles stronger  Breast care:  When your milk comes in, your breasts may feel full and hard  Ask how to care for your breasts, even if you are not breastfeeding  Constipation:  Do not try to push the bowel movement out if it is too hard   High-fiber foods, extra liquids, and regular exercise can help you prevent constipation  Examples of high-fiber foods are fruit and bran  Prune juice and water are good liquids to drink  Regular exercise helps your digestive system work  You may also be told to take over-the-counter fiber and stool softener medicines  Take these items as directed  Hemorrhoids:  Pregnancy can cause severe hemorrhoids  You may have rectal pain because of the hemorrhoids  Ask how to prevent or treat hemorrhoids  Perineum care: Your perineum is the area between your vagina and anus  Keep the area clean and dry to help it heal and to prevent infection  Wash the area gently with soap and water when you bathe or shower  Rinse your perineum with warm water when you use the toilet  Your primary healthcare provider may suggest you use a warm sitz bath to help decrease pain  A sitz bath is a bathtub or basin filled to hip level  Stay in the sitz bath for 20 to 30 minutes, or as directed  Vaginal discharge: You will have vaginal discharge, called lochia, after your delivery  The lochia is bright red the first day or two after the birth  By the fourth day, the amount decreases, and it turns red-brown  Use a sanitary pad rather than a tampon to prevent a vaginal infection  It is normal to have lochia up to 8 weeks after your baby is born  Monthly periods: Your period may start again within 7 to 12 weeks after your baby is born  If you are breastfeeding, it may take longer for your period to start again  You can still get pregnant again even though you do not have your monthly period  Talk with your primary healthcare provider about a birth control method that will be good for you if you do not want to get pregnant  Mood changes: Many new mothers have some kind of mood changes after delivery  Some of these changes occur because of lack of sleep, hormone changes, and caring for a new baby  Some mood changes can be more serious, such as postpartum depression   Talk with your primary healthcare provider if you feel unable to care for yourself or your baby  Sexual activity:  You may need to avoid sex for 6 to 7 weeks after you have your baby  You may notice you have a decreased desire for sex, or sex may be painful  You may need to use a vaginal lubricant (gel) to help make sex more comfortable  Contact your primary healthcare provider if:   · You have heavy vaginal bleeding that fills 1 or more sanitary pads in 1 hour  · You have a fever  · Your pain does not go away, or gets worse  · The skin between your vagina and rectum is swollen, warm, or red  · You have swollen, hard, or painful breasts  · You feel very sad or depressed  · You feel more tired than usual      · You have questions or concerns about your condition or care  Seek care immediately or call 911 if:   · You have pus or yellow drainage coming from your vagina or wound  · You are urinating very little, or not at all  · Your arm or leg feels warm, tender, and painful  It may look swollen and red  · You feel lightheaded, have sudden and worsening chest pain, or trouble breathing  You may have more pain when you take deep breaths or cough, or you may cough up blood  © 2014 [address] is for End User's use only and may not be sold, redistributed or otherwise used for commercial purposes  All illustrations and images included in CareNotes® are the copyrighted property of [employer]  or [employer]  The above information is an  only  It is not intended as medical advice for individual conditions or treatments  Talk to your doctor, nurse or pharmacist before following any medical regimen to see if it is safe and effective for you

## 2019-02-06 NOTE — ANESTHESIA POSTPROCEDURE EVALUATION
Post-Op Assessment Note      CV Status:  Stable    Mental Status:  Alert and awake    Hydration Status:  Stable    PONV Controlled:  None    Airway Patency:  Patent    Post Op Vitals Reviewed: Yes          Staff: Anesthesiologist     Post-op block assessment: catheter intact and site cleaned      Vitals:    02/06/19 0815   BP: 117/73   Pulse: (!) 107   Resp:    Temp:    SpO2:        BP      Temp      Pulse    Resp      SpO2

## 2019-02-06 NOTE — L&D DELIVERY NOTE
DELIVERY NOTE  Adele Ma 19 y o  female MRN: [id number]  Unit/Bed#: -01 Encounter: [id number]    Obstetrician:  Ginette Groves    Assistant: [facility]    Pre-Delivery Diagnosis: Term pregnancy  Induced labor  Single fetus  Chronic HTN  Gordons Syndrome    Post-Delivery Diagnosis: Same as above - Delivered  Viable male fetus  3b degree laceration  Vacuum assisted vaginal delivery    Procedure: Low vacuum assisted vaginal delivery  Repair 3b degree spontaneous laceration      Delivery Date and Time: 2019 at 7065    Indications for instrumental delivery: Prolonged second stage of labor  Shortening of the second stage for maternal benefit, maternal exhaustion    Estimated Blood Loss:  927IG           Complications:  None    Brief description of labor:  Please see additional notes for details of the labor course  Description of Procedure: With maternal expulsive efforts, the patient delivered a viable male   The position at delivery was MC  The fetal vertex delivered spontaneously  There was a nuchal cord, 1 loop around the fetal neck-tight, requiring cord to be doubly clamped and cut  The anterior shoulder delivered atraumatically with maternal expulsive forces and the assistance of gentle downward traction  The posterior shoulder delivered with maternal expulsive forces and the assistance of gentle upward traction  The remainder of the fetus delivered spontaneously  Upon delivery, the  was placed on the mother's abdomen and the umbilical cord was clamped and cut  The  resuscitator evaluated the  at bedside  Arterial and venous cord blood gases and cord blood were collected for analysis  These were sent to the lab  Active management of the third stage of labor included uterine massage and administration of IV Pitocin at 250 jacob-units per minute  The uterus was noted to contract down well   The placenta delivered spontaneously and was noted to have a centrally-inserted 3-vessel cord  It was sent for storage  The vagina, cervix, perineum, and rectum were inspected and there was noted to be a 3b laceration  Laceration Repair  Patient was comfortable with epidural at that time  A 3b lac was identified and required repair  The laceration was repaired with 2-0 Vicryl at the sphincter in PISA fashion, repaired end-to-end  The remained of the lac was repaired with 3-0 Vicryl rapid  The repair inspected and found to have good tissue reapproximation and hemostasis  Hemostasis was confirmed at the conclusion of this procedure  Patient was noticed several gushes of blood, clots were swept to the lower uterine segment and mild atony was noted  Patient was administered 250 mcg of intramuscular Hemabate and 600 mcg of Cytotec per rectum  After sweeping all clots and pharmacotherapy, lower urine segment had improved tone and bleeding diminished  At the conclusion of the delivery, all needle, sponge, and instrument counts were noted to be correct  The patient tolerated the procedure well and was allowed to recover in labor and delivery room  Dr Adal Castillo MD was present      Shital You MD  OB/GYN PGY-3  2/6/2019 7:58 AM

## 2019-02-06 NOTE — NURSING NOTE
Patient unable to void after multiple attempts and c/o discomfort  Bladder was scanned for 500ml residual urine  Straight cathed with michele for 800 ml clear yellow urine  Catheter left in at this time

## 2019-02-06 NOTE — OB LABOR/OXYTOCIN SAFETY PROGRESS
Oxytocin Safety Progress Check Note - Carina Rodrigues 19 y o  female MRN: [id number]    Unit/Bed#: -01 Encounter: [id number]    Obstetric History       T0      L0     SAB0   TAB0   Ectopic0   Multiple0   Live Births0      Gestational Age: 38w1d  Dose (essence-units/min) Oxytocin: 30 essence-units/min  Contraction Frequency (minutes): 2-3  Contraction Quality: Moderate  Tachysystole: No   Dilation: 7        Effacement (%): 80  Station: -1  Baseline Rate: 140 bpm  Fetal Heart Rate: 172 BPM  FHR Category: Category I          Notes/comments:   Patient reports feeling "pressure in my butt" with contractions  Cervical exam has changed  Significant molding noted on fetal scalp  Possible station change to 0 station but difficult to be certain due to molding  She has 2 late decels and was repositioned on maternal right with the peanut ball  Will continue to monitor and reassess in 2 hours  Discussed with Dr Eliseo Pacheco MD 2019 2:29 AM

## 2019-02-06 NOTE — LACTATION NOTE
This note was copied from a baby's chart  CONSULT - LACTATION  Baby Boy  Alyssa Kang) Miravich 0 days male MRN: [id number]    [facility] Room / Bed: (N)/(N) Encounter: [id number]    Maternal Information     MOTHER:  Adalgisa Dinh  Maternal Age: 19 y o    OB History: #: 1, Date: 19, Sex: Male, Weight: 3317 g (7 lb 5 oz), GA: 38w1d, Delivery: Vaginal, Vacuum (Extractor), Apgar1: 7, Apgar5: 8, Living: Living, Birth Comments: None   Previouse breast reduction surgery? No    Lactation history:   Has patient previously breast fed: No   How long had patient previously breast fed:     Previous breast feeding complications:       Past Surgical History:   Procedure Laterality Date    TOOTH EXTRACTION      WISDOM TOOTH EXTRACTION         Birth information:  YOB: 2021   Time of birth: 6:44 AM   Sex: male   Delivery type: Vaginal, Vacuum (Extractor)   Birth Weight: 3317 g (7 lb 5 oz)   Percent of Weight Change: 0%     Gestational Age: 43w4d   [unfilled]    Assessment     Breast and nipple assessment: normal assessment     Assessment: normal assessment    Feeding assessment: feeding well  LATCH:  Latch: Grasps breast, tongue down, lips flanged, rhythmic sucking   Audible Swallowing: Spontaneous and intermittent (24 hours old)   Type of Nipple: Everted (After stimulation)   Comfort (Breast/Nipple): Soft/non-tender   Hold (Positioning): Full assist, staff holds infant at breast   LATCH Score: 8          Feeding recommendations:  breast feed on demand     Met with mother  Provided mother with Ready, Set, Baby booklet  Discussed Skin to Skin contact an benefits to mom and baby  Talked about the delay of the first bath until baby has adjusted  Spoke about the benefits of rooming in  Feeding on cue and what that means for recognizing infant's hunger  Avoidance of pacifiers for the first month discussed   Talked about exclusive breastfeeding for the first 6 months  Positioning and latch reviewed as well as showing images of other feeding positions  Discussed the properties of a good latch in any position  Reviewed hand/manual expression  Discussed s/s that baby is getting enough milk and some s/s that breastfeeding dyad may need further help  Gave information on common concerns, what to expect the first few weeks after delivery, preparing for other caregivers, and how partners can help  Resources for support also provided  Discussed 2nd night syndrome and ways to calm infant  Hand out given  Information on hand expression given  Discussed benefits of knowing how to manually express breast including stimulating milk supply, softening nipple for latch and evacuating breast in the event of engorgement  Assisted Mom with latching infant in football hold  Discussed belly to belly and one of infant's hands on each side of breast for good alignment  Mom needs full assist to support infant at this time  Enc her to call for lactation support throughout her stay to help her become more confident and independent in her breastfeeding skills       Tushar Ledbetter RN 2/6/2019 2:20 PM

## 2019-02-06 NOTE — PLAN OF CARE
BIRTH - VAGINAL/ SECTION     Fetal and maternal status remain reassuring during the birth process Completed     Emotionally satisfying birthing experience for mother/fetus Completed        Labor & Delivery     Manages discomfort Completed     Patient vital signs are stable Completed          DISCHARGE PLANNING     Discharge to home or other facility with appropriate resources Progressing        INFECTION - ADULT     Absence or prevention of progression during hospitalization Progressing     Absence of fever/infection during neutropenic period Progressing        Knowledge Deficit     Patient/family/caregiver demonstrates understanding of disease process, treatment plan, medications, and discharge instructions Progressing     Verbalizes understanding of labor plan Progressing        PAIN - ADULT     Verbalizes/displays adequate comfort level or baseline comfort level Progressing        POSTPARTUM     Experiences normal postpartum course Progressing     Appropriate maternal -  bonding Progressing     Establishment of infant feeding pattern Progressing     Incision(s), wounds(s) or drain site(s) healing without S/S of infection Progressing        Potential for Falls     Patient will remain free of falls Progressing        SAFETY ADULT     Maintain or return to baseline ADL function Progressing     Maintain or return mobility status to optimal level Progressing

## 2019-02-06 NOTE — OB LABOR/OXYTOCIN SAFETY PROGRESS
Oxytocin Safety Progress Check Note - Shayan Carson 19 y o  female MRN: [id number]    Unit/Bed#: -01 Encounter: [id number]    Obstetric History       T0      L0     SAB0   TAB0   Ectopic0   Multiple0   Live Births0      Gestational Age: 38w1d  Dose (essence-units/min) Oxytocin: 30 essence-units/min  Contraction Frequency (minutes): 2-3  Contraction Quality: Moderate  Tachysystole: No   Dilation: 9        Effacement (%): 100  Station: 1  Baseline Rate: 145 bpm  Fetal Heart Rate: 148 BPM  FHR Category: Category I          Notes/comments:   Patient is comfortable with her epidural but is feeling pressure  Pelvis feels tight  Patient was examined by Dr Megan Porter  Will continue to monitor and recheck in approximately 30 minutes               Ximena Hines MD 2019 4:36 AM

## 2019-02-06 NOTE — OB LABOR/OXYTOCIN SAFETY PROGRESS
Oxytocin Safety Progress Check Note - Jack Watts 19 y o  female MRN: [id number]    Unit/Bed#: -01 Encounter: [id number]    Obstetric History       T0      L0     SAB0   TAB0   Ectopic0   Multiple0   Live Births0      Gestational Age: 38w0d  Dose (essence-units/min) Oxytocin: 20 essence-units/min  Contraction Frequency (minutes): 2-3 5  Contraction Quality: Mild  Tachysystole: No   Dilation: 2        Effacement (%): 80  Station: -3  Baseline Rate: 145 bpm  Fetal Heart Rate: 159 BPM  FHR Category: Category II          Notes/comments:   Patient is comfortable with her epidural  Aviles balloon still in place  Category 2 strip due to intermittent late decelerations with accelerations and moderate variability  Discussed with Dr Mckenna Lewis MD 2019 7:20 PM

## 2019-02-06 NOTE — DISCHARGE SUMMARY
Discharge Summary - Mayte Jones 19 y o  female MRN: [id number]    Unit/Bed#: -01 Encounter: [id number]    Admission Date: 2019     Discharge Date: 2019    Admitting Diagnosis: 45 week gestation, Milon Mediate syndrome, cHTN, obesity, asthma    Discharge Diagnosis: same    Procedures: vacuum, low    Attending: Ernestine Ellis MD    Hospital Course:     Mayte Jones is a 19 y o   who was admitted at 39 wks for IOL cHTN  She underwent an vacuum assisted vaginal delivery with subsequent 3b lac  She received cefoxitin 1g IV   was transferred to  nursery  Patient tolerated the procedure well and was transferred to recovery in stable condition  On day of discharge, she was ambulating and able to reasonably perform all ADLs  She was voiding and had appropriate bowel function  Pain was well controlled  She was discharged home on postpartum day #2 without complications  Patient was instructed to follow up with her OB as an outpatient and was given appropriate warnings to call provider if she develops signs of infection or uncontrolled pain  Complications: None    Condition at discharge: good     Discharge instructions/Information to patient and family:   See after visit summary for information provided to patient and family  Provisions for Follow-Up Care:  See after visit summary for information related to follow-up care and any pertinent home health orders  Disposition: Home    Planned Readmission: No    Discharge Medications: For a complete list of the patient's medications, please refer to her med tatiana Norman MD  OB/GYN PGY-2  2019 7:47 AM

## 2019-02-06 NOTE — OB LABOR/OXYTOCIN SAFETY PROGRESS
Oxytocin Safety Progress Check Note - Nicho Long 19 y o  female MRN: [id number]    Unit/Bed#: -01 Encounter: [id number]    Obstetric History       T0      L0     SAB0   TAB0   Ectopic0   Multiple0   Live Births0      Gestational Age: 38w0d  Dose (essence-units/min) Oxytocin: 30 essence-units/min  Contraction Frequency (minutes): 2-3  Contraction Quality: Mild  Tachysystole: No   Dilation: 5        Effacement (%): 80  Station: -1  Baseline Rate: 145 bpm  Fetal Heart Rate: 140 BPM  FHR Category: Category I          Notes/comments:   AROM for clear fluid  IUPC placed  Discussed with Dr Quita Velarde MD 2019 10:46 PM

## 2019-02-06 NOTE — OB LABOR/OXYTOCIN SAFETY PROGRESS
Oxytocin Safety Progress Check Note - Alysa Granado 19 y o  female MRN: [id number]    Unit/Bed#: -01 Encounter: [id number]    Obstetric History       T0      L0     SAB0   TAB0   Ectopic0   Multiple0   Live Births0      Gestational Age: 38w0d  Dose (essence-units/min) Oxytocin: 26 essence-units/min  Contraction Frequency (minutes): 2-3  Contraction Quality: Mild  Tachysystole: No   Dilation: 2        Effacement (%): 80  Station: -3  Baseline Rate: 145 bpm  Fetal Heart Rate: 130 BPM  FHR Category: Category I          Notes/comments:   Patient is comfortable with the epidural  Aviles balloon still in place  Cervix checked and feels thinned around the FB  External os approximately 2-3cm at this time  Contractions every 2-2 5 minutes  Pit at 32  Will continue to monitor and reassess in 2 hours unless otherwise indicated             Mali Miller MD 2019 9:38 PM

## 2019-02-06 NOTE — OB LABOR/OXYTOCIN SAFETY PROGRESS
Oxytocin Safety Progress Check Note - Nenita Ng 19 y o  female MRN: [id number]    Unit/Bed#: -01 Encounter: [id number]    Obstetric History       T0      L0     SAB0   TAB0   Ectopic0   Multiple0   Live Births0      Gestational Age: 38w1d  Dose (essence-units/min) Oxytocin: 30 essence-units/min  Contraction Frequency (minutes): 2-4  Contraction Quality: Moderate  Tachysystole: No   Dilation: 8        Effacement (%): 90  Station: 0  Baseline Rate: 150 bpm  Fetal Heart Rate: 163 BPM  FHR Category: Category I          Notes/comments:   Patient reports increased pressure including between contractions  Category 1 strip  Contractions are every 2-1/2 minutes on 30 of Pitocin  Will continue to monitor and reassess as indicated              Idalia Valencia MD 2019 3:38 AM

## 2019-02-06 NOTE — OB LABOR/OXYTOCIN SAFETY PROGRESS
Oxytocin Safety Progress Check Note - Talia Singleton 19 y o  female MRN: [id number]    Unit/Bed#: -01 Encounter: [id number]    Obstetric History       T0      L0     SAB0   TAB0   Ectopic0   Multiple0   Live Births0      Gestational Age: 38w1d  Dose (essence-units/min) Oxytocin: 30 essence-units/min  Contraction Frequency (minutes): 2-3  Contraction Quality: Moderate  Tachysystole: No   Dilation: 6        Effacement (%): 80  Station: -1  Baseline Rate: 145 bpm  Fetal Heart Rate: 142 BPM  FHR Category: Category I          Notes/comments:   Patient is resting comfortably with her epidural  Category 1 strip  Cervical exam has changed  Will continue pitocin at 30 milliunits per minute  Contractions adequate  Will continue to monitor and reassess in 2 hours unless otherwise indicated  Discussed with Dr Dorena Osgood Fredna Leas, MD 2019 1:09 AM

## 2019-02-07 LAB
ERYTHROCYTE [DISTWIDTH] IN BLOOD BY AUTOMATED COUNT: 15.3 % (ref 11.6–15.1)
HCT VFR BLD AUTO: 24.4 % (ref 34.8–46.1)
HGB BLD-MCNC: 7.6 G/DL (ref 11.5–15.4)
MCH RBC QN AUTO: 27.1 PG (ref 26.8–34.3)
MCHC RBC AUTO-ENTMCNC: 31.1 G/DL (ref 31.4–37.4)
MCV RBC AUTO: 87 FL (ref 82–98)
PLATELET # BLD AUTO: 186 THOUSANDS/UL (ref 149–390)
PMV BLD AUTO: 10.9 FL (ref 8.9–12.7)
RBC # BLD AUTO: 2.8 MILLION/UL (ref 3.81–5.12)
WBC # BLD AUTO: 14.21 THOUSAND/UL (ref 4.31–10.16)

## 2019-02-07 PROCEDURE — 85027 COMPLETE CBC AUTOMATED: CPT | Performed by: OBSTETRICS & GYNECOLOGY

## 2019-02-07 RX ORDER — HYDROCHLOROTHIAZIDE 12.5 MG/1
12.5 TABLET ORAL DAILY
Status: DISCONTINUED | OUTPATIENT
Start: 2019-02-07 | End: 2019-02-07

## 2019-02-07 RX ORDER — FERROUS SULFATE 325(65) MG
325 TABLET ORAL 2 TIMES DAILY WITH MEALS
Status: DISCONTINUED | OUTPATIENT
Start: 2019-02-08 | End: 2019-02-08 | Stop reason: HOSPADM

## 2019-02-07 RX ORDER — HYDROCHLOROTHIAZIDE 12.5 MG/1
12.5 TABLET ORAL
Status: DISCONTINUED | OUTPATIENT
Start: 2019-02-08 | End: 2019-02-08 | Stop reason: HOSPADM

## 2019-02-07 RX ADMIN — HYDROCHLOROTHIAZIDE 12.5 MG: 12.5 TABLET ORAL at 09:22

## 2019-02-07 RX ADMIN — DOCUSATE SODIUM 100 MG: 100 CAPSULE, LIQUID FILLED ORAL at 07:45

## 2019-02-07 RX ADMIN — IBUPROFEN 600 MG: 600 TABLET ORAL at 19:59

## 2019-02-07 RX ADMIN — IBUPROFEN 600 MG: 600 TABLET ORAL at 07:45

## 2019-02-07 RX ADMIN — WITCH HAZEL 1 PAD: 500 SOLUTION RECTAL; TOPICAL at 19:59

## 2019-02-07 RX ADMIN — DOCUSATE SODIUM 100 MG: 100 CAPSULE, LIQUID FILLED ORAL at 18:00

## 2019-02-07 NOTE — PLAN OF CARE
DISCHARGE PLANNING     Discharge to home or other facility with appropriate resources Progressing        INFECTION - ADULT     Absence or prevention of progression during hospitalization Progressing     Absence of fever/infection during neutropenic period Progressing        Knowledge Deficit     Patient/family/caregiver demonstrates understanding of disease process, treatment plan, medications, and discharge instructions Progressing     Verbalizes understanding of labor plan Progressing        PAIN - ADULT     Verbalizes/displays adequate comfort level or baseline comfort level Progressing        POSTPARTUM     Experiences normal postpartum course Progressing     Appropriate maternal -  bonding Progressing     Establishment of infant feeding pattern Progressing     Incision(s), wounds(s) or drain site(s) healing without S/S of infection Progressing        Potential for Falls     Patient will remain free of falls Progressing        Prexisting or High Potential for Compromised Skin Integrity     Skin integrity is maintained or improved Progressing        SAFETY ADULT     Maintain or return to baseline ADL function Progressing     Maintain or return mobility status to optimal level Progressing

## 2019-02-07 NOTE — PROGRESS NOTES
Postpartum Progress Note - OB/GYN   Speedy Frias 19 y o  female MRN: [id number]  Unit/Bed#: -01 Encounter: [id number]    Postpartum Day:1    Procedure: Vacuum Vaginal Delivery    Subjective ROS  Pain: yes, minimal  Tolerating Oral Intake: yes   Voiding: yes, michele in place  Flatus: yes  Bowel Movement: no  Ambulating: yes  Breastfeeding: yes  Chest Pain: no  Shortness of Breath: no  Leg Pain/Discomfort: no  Lochia: moderate      Vitals: /53   Pulse (!) 108   Temp 98 4 °F (36 9 °C) (Oral)   Resp 18   Ht 5' 2" (1 575 m)   Wt 110 kg (242 lb)   LMP  (LMP Unknown)       Intake/Output Summary (Last 24 hours) at 02/07/19 0624  Last data filed at 02/07/19 0545   Gross per 24 hour   Intake           998 09 ml   Output             1600 ml   Net          -601 91 ml       Physical Exam  General: appears well, NAD, alert and oriented x 3, pleasant and cooperative   Cardiovascular: Regular, Rate and Rhythm, no murmur, gallop or rub   Lungs: Clear to Auscultation Bilaterally, no wheezing, rhonchi or rales   Abdomen: Soft, non-distended, non-tender, no rebound, guarding or CVA tenderness, bowel sounds+  Fundus: Firm & Non-Tender  Fundal Location:     -1 cm below the umbilicus  Lower Extremities: Non-Tender, no edema      Labs:   Admission on 02/04/2019   Component Date Value    WBC 02/04/2019 13 15*    RBC 02/04/2019 4 18     Hemoglobin 02/04/2019 11 0*    Hematocrit 02/04/2019 36 2     MCV 02/04/2019 87     MCH 02/04/2019 26 3*    MCHC 02/04/2019 30 4*    RDW 02/04/2019 14 7     Platelets 51/57/5179 212     MPV 02/04/2019 10 8     ABO Grouping 02/04/2019 O     Rh Factor 02/04/2019 Positive     Antibody Screen 02/04/2019 Negative     Specimen Expiration Date 02/04/2019 65315299     Sodium 02/04/2019 136     Potassium 02/04/2019 5 0     Chloride 02/04/2019 109*    CO2 02/04/2019 17*    ANION GAP 02/04/2019 10     BUN 02/04/2019 9     Creatinine 02/04/2019 0 64     Glucose 02/04/2019 104     Calcium 2019 9 1     AST 2019 16     ALT 2019 12     Alkaline Phosphatase 2019 133*    Total Protein 2019 6 8     Albumin 2019 2 7*    Total Bilirubin 2019 0 11*    eGFR 2019 129     RPR 2019 Non-Reactive     Creatinine, Ur 2019 39 4     Protein Urine Random 2019 <6     Prot/Creat Ratio, Ur 2019 <0 15*    HIV-1 Antibody 2018 non-reactive     pH, Cord Thee 2019 7 285     pCO2, Cord Thee 2019 38 1     pO2, Cord Thee 2019 30 0     HCO3, Cord Thee 2019 17 7     Base Exc, Cord Thee 2019 -8 2*    O2 Cont, Cord Thee 2019 16 1     O2 HGB,VENOUS CORD 2019 69 6        Assessment:  19 y o   s/p VAVD Postpartum day  1    Plan:  - Produced 45 cc/hr of urine overnight, will consider void trial this morning  Continue routine care, Advance diet and Encourage ambulation    Merton Lesches, MD [address] PGY1  2019  6:24 AM

## 2019-02-08 VITALS
SYSTOLIC BLOOD PRESSURE: 130 MMHG | BODY MASS INDEX: 44.53 KG/M2 | HEART RATE: 100 BPM | RESPIRATION RATE: 20 BRPM | DIASTOLIC BLOOD PRESSURE: 60 MMHG | WEIGHT: 242 LBS | TEMPERATURE: 98.5 F | HEIGHT: 62 IN | OXYGEN SATURATION: 99 %

## 2019-02-08 RX ORDER — DIAPER,BRIEF,INFANT-TODD,DISP
1 EACH MISCELLANEOUS AS NEEDED
Qty: 30 G | Refills: 0 | Status: SHIPPED | OUTPATIENT
Start: 2019-02-08 | End: 2019-11-11

## 2019-02-08 RX ORDER — DOCUSATE SODIUM 100 MG/1
100 CAPSULE, LIQUID FILLED ORAL 2 TIMES DAILY
Qty: 60 CAPSULE | Refills: 2 | Status: SHIPPED | OUTPATIENT
Start: 2019-02-08 | End: 2019-11-11

## 2019-02-08 RX ORDER — IBUPROFEN 600 MG/1
600 TABLET ORAL EVERY 6 HOURS PRN
Qty: 30 TABLET | Refills: 1 | Status: SHIPPED | OUTPATIENT
Start: 2019-02-08 | End: 2019-11-11

## 2019-02-08 RX ORDER — SENNOSIDES 8.6 MG
1 TABLET ORAL
Qty: 120 EACH | Refills: 0 | Status: CANCELLED
Start: 2019-02-08

## 2019-02-08 RX ORDER — ACETAMINOPHEN 325 MG/1
325 TABLET ORAL EVERY 4 HOURS PRN
Qty: 30 TABLET | Refills: 0 | Status: CANCELLED
Start: 2019-02-08

## 2019-02-08 RX ORDER — CALCIUM CARBONATE 200(500)MG
1000 TABLET,CHEWABLE ORAL DAILY PRN
Refills: 0 | Status: CANCELLED
Start: 2019-02-08

## 2019-02-08 RX ORDER — SIMETHICONE 80 MG
80 TABLET,CHEWABLE ORAL 4 TIMES DAILY PRN
Qty: 30 TABLET | Refills: 0 | Status: CANCELLED
Start: 2019-02-08

## 2019-02-08 RX ADMIN — DOCUSATE SODIUM 100 MG: 100 CAPSULE, LIQUID FILLED ORAL at 09:24

## 2019-02-08 RX ADMIN — FERROUS SULFATE TAB 325 MG (65 MG ELEMENTAL FE) 325 MG: 325 (65 FE) TAB at 07:24

## 2019-02-08 NOTE — PROGRESS NOTES
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/61  2  3b Lac: s/p Cefoxitin, Colace bid   3  Continue routine post partum care   Encourage ambulation   Encourage breastfeeding    Subjective: Patient is doing well  She says that her pain is well controlled and that she is voiding without difficulty  She is excited to go home  Pain: yes, cramping, improved with meds  Tolerating PO: yes  Voiding: yes  Flatus: yes  BM: no, declined laxative  Ambulating: yes  Breastfeeding:  yes  Chest pain: no  Shortness of breath: no  Leg pain: no  Lochia: minimal    Objective:     Vitals: /61   Pulse 104 Comment: radial  Temp 98 6 °F (37 °C) (Oral)   Resp 18   Ht 5' 2" (1 575 m)   Wt 110 kg (242 lb)   LMP  (LMP Unknown) Comment: conceived on ocps (missed pills)  SpO2 99%   Breastfeeding? Yes   BMI 44 26 kg/m²       Intake/Output Summary (Last 24 hours) at 02/08/19 0636  Last data filed at 02/07/19 1230   Gross per 24 hour   Intake                0 ml   Output             1625 ml   Net            -1625 ml       Lab Results   Component Value Date    WBC 14 21 (H) 02/07/2019    HGB 7 6 (L) 02/07/2019    HCT 24 4 (L) 02/07/2019    MCV 87 02/07/2019     02/07/2019       Physical Exam:     Gen: AAOx3, NAD  CV: RRR  Lungs: CTA b/l  Abd: Soft, non-tender, non-distended, no rebound or guarding  Uterine fundus firm and non-tender, 1 cm below the umbilicus     Ext: Non tender    Idalia Holguin MD  2/8/2019  6:36 AM

## 2019-02-08 NOTE — PLAN OF CARE
DISCHARGE PLANNING     Discharge to home or other facility with appropriate resources Completed        INFECTION - ADULT     Absence or prevention of progression during hospitalization Completed     Absence of fever/infection during neutropenic period Completed        Knowledge Deficit     Patient/family/caregiver demonstrates understanding of disease process, treatment plan, medications, and discharge instructions Completed     Verbalizes understanding of labor plan Completed        PAIN - ADULT     Verbalizes/displays adequate comfort level or baseline comfort level Completed        POSTPARTUM     Experiences normal postpartum course Completed     Appropriate maternal -  bonding Completed     Establishment of infant feeding pattern Completed     Incision(s), wounds(s) or drain site(s) healing without S/S of infection Completed        Potential for Falls     Patient will remain free of falls Completed        Prexisting or High Potential for Compromised Skin Integrity     Skin integrity is maintained or improved Completed        SAFETY ADULT     Maintain or return to baseline ADL function Completed     Maintain or return mobility status to optimal level Completed

## 2019-02-08 NOTE — LACTATION NOTE
This note was copied from a baby's chart  Met with mother to go over feeding log since birth for the first week  Emphasized 8 or more (12) feedings in a 24 hour period, what to expect for the number of diapers per day of life and the progression of properties of the  stooling pattern  Discussed s/s that breastfeeding is going well after day 4 and when to get help from a pediatrician or lactation support person after day 4  Booklet included Breast Pumping Instructions, When You Go Back to Work or School, and Breastfeeding Resources for after discharge including access to the number for the [employer]  Powerpoints given on mom/ care class and breastfeeding class at patient request     Discussed s/s engorgement and how to manage with medications and cool compresses as well as s/s mastitis and when to contact physician  Discussed a good feeding as at least 15 min and enc a deep latch at each feeding to promote adequate milk transfer  Enc Mom to call for lactation support before going home

## 2019-02-15 LAB — PLACENTA IN STORAGE: NORMAL

## 2019-02-26 ENCOUNTER — TELEPHONE (OUTPATIENT)
Dept: NEPHROLOGY | Facility: CLINIC | Age: 19
End: 2019-02-26

## 2019-02-27 ENCOUNTER — POSTPARTUM VISIT (OUTPATIENT)
Dept: OBGYN CLINIC | Facility: CLINIC | Age: 19
End: 2019-02-27

## 2019-02-27 ENCOUNTER — OFFICE VISIT (OUTPATIENT)
Dept: NEPHROLOGY | Facility: CLINIC | Age: 19
End: 2019-02-27
Payer: COMMERCIAL

## 2019-02-27 VITALS
WEIGHT: 225.2 LBS | BODY MASS INDEX: 41.44 KG/M2 | HEIGHT: 62 IN | DIASTOLIC BLOOD PRESSURE: 70 MMHG | SYSTOLIC BLOOD PRESSURE: 136 MMHG

## 2019-02-27 VITALS
BODY MASS INDEX: 41.26 KG/M2 | SYSTOLIC BLOOD PRESSURE: 120 MMHG | WEIGHT: 224.2 LBS | HEIGHT: 62 IN | DIASTOLIC BLOOD PRESSURE: 80 MMHG

## 2019-02-27 DIAGNOSIS — N25.89 PSEUDOHYPOALDOSTERONISM, TYPE 2: Primary | ICD-10-CM

## 2019-02-27 DIAGNOSIS — Z3A.37 37 WEEKS GESTATION OF PREGNANCY: ICD-10-CM

## 2019-02-27 DIAGNOSIS — Z87.59 STATUS POST VACUUM-ASSISTED VAGINAL DELIVERY: ICD-10-CM

## 2019-02-27 PROCEDURE — 99213 OFFICE O/P EST LOW 20 MIN: CPT | Performed by: PEDIATRICS

## 2019-02-27 PROCEDURE — 99024 POSTOP FOLLOW-UP VISIT: CPT | Performed by: OBSTETRICS & GYNECOLOGY

## 2019-02-27 RX ORDER — HYDROCHLOROTHIAZIDE 12.5 MG/1
12.5 TABLET ORAL DAILY
Qty: 90 TABLET | Refills: 3
Start: 2019-02-27 | End: 2019-03-04 | Stop reason: SDUPTHER

## 2019-02-27 NOTE — PATIENT INSTRUCTIONS
1  Darnell's syndrome: Blood pressures remain relatively stable   Would like for Laurence to check her blood pressure and continue to monitor trend  Blood work was stable post-partum  Continue on current dose of 12 5 mg for now  Follow up in 3 months

## 2019-02-27 NOTE — LETTER
March 5, 2019     MD Mira Willett [phone]    Patient: Minerva Kaur   YOB: 2000   Date of Visit: 2/27/2019       Dear Dr Charlene Beal: Thank you for referring Antonio Ayala to me for evaluation  Below are my notes for this consultation  If you have questions, please do not hesitate to call me  I look forward to following your patient along with you  Sincerely,        Charlotte Prado MD        CC: No Recipients  Charlotte Prado MD  3/5/2019  3:33 PM  Sign at close encounter    Pediatric Nephrology Follow Up   Elizabeth Noe    [id number]    Date:3/5/2019        Assessment/Plan   Assessment:   22-year-old female with history of  Pseudohypoaldosteronism type 2 here for follow-up post partum  Plan:    Pseudo hypoaldosteronism type 2  Patient Instructions   1  Darnell's syndrome: Blood pressures remain relatively stable   Would like for [facility] to check her blood pressure and continue to monitor trend  Blood work was stable post-partum  Continue on current dose of 12 5 mg for now  Follow up in 3 months  HPI: Minerva Kaur is a 19 y  o female who presents for follow up of   Chief Complaint   Patient presents with    Follow-up     Minerva Kaur is accompanied by Her mother who assists in providing the history today  [facility] states that she has been doing well overall since her delivery and her last visit in Nephrology Clinic  She denies any recent emergency room visits  [facility] states that she has continued to take 12 5 mg tablet of hydrochlorothiazide without any issues  She denies regular headaches or dizziness  Has not been checking her blood pressures at home as frequently as she had been prior to delivering  Sabi Stafford her mother recall fairly well-controlled blood pressures during her hospital stay for her delivery    Trying to encourage her to drink enough fluids during the day per mom  Currently breast feeding exclusively  Adjusting well postpartum  Review of Systems  Constitutional:   Negative for fevers, fatigue or malaise  HEENT: negative for vision or hearing changes, rhinorrhea, congestion or sore throat  Respiratory: negative for cough or shortness of breath? ?  Cardiovascular: negative for chest pain, facial or lower extremity edema  Gastrointestinal: negative for abdominal pain, nausea, vomiting, diarrhea or constipation  Genitourinary: negative for dysuria, hematuria, urgency, frequency or foamy urine  Endocrine: negative for changes in weight  Musculoskeletal: negative for joint pain or swelling, back pain  Neurologic: negative for headache, dizziness  Hematologic: negative for bruising or bleeding  Integumentary: negative for rashes  Psychiatric/Behavioral: no behavioral changes    The remainder of review of systems as noted per HPI  ?           Past Medical History:   Diagnosis Date    Asthma     prn - not used recently    Contraception     Elevated glycated hemoglobin     History of asthma     History of UTI     3 times/yr - last 4/2018    Hypertension     chronic    Injury of foot, right     Injury of knee, right     Injury, ankle     Kidney stone     Late menses     Migraine     1-2 times/month    Obesity     Pain in finger of right hand     Pain in right wrist     Pseudohypoaldosteronism, type 2     Severe headache     Sprain and strain of deltoid (ligament) of ankle      Past Surgical History:   Procedure Laterality Date    TOOTH EXTRACTION      WISDOM TOOTH EXTRACTION        Family History   Problem Relation Age of Onset    Hypertension Mother     Nephrolithiasis Mother     No Known Problems Father     Hypertension Maternal Grandfather     Heart disease Maternal Grandfather     Hypertension Paternal Grandmother     Hypertension Paternal Grandfather     Heart disease Paternal Grandfather     Diabetes Maternal Uncle     Breast cancer Neg Hx         paternal cousin      Social History     Socioeconomic History    Marital status: Single     Spouse name: Not on file    Number of children: Not on file    Years of education: Not on file    Highest education level: Not on file   Occupational History    Not on file   Social Needs    Financial resource strain: Not on file    Food insecurity:     Worry: Not on file     Inability: Not on file    Transportation needs:     Medical: Not on file     Non-medical: Not on file   Tobacco Use    Smoking status: Never Smoker    Smokeless tobacco: Never Used   Substance and Sexual Activity    Alcohol use: No    Drug use: No    Sexual activity: Yes     Partners: Male   Lifestyle    Physical activity:     Days per week: Not on file     Minutes per session: Not on file    Stress: Not on file   Relationships    Social connections:     Talks on phone: Not on file     Gets together: Not on file     Attends Yazdanism service: Not on file     Active member of club or organization: Not on file     Attends meetings of clubs or organizations: Not on file     Relationship status: Not on file    Intimate partner violence:     Fear of current or ex partner: Not on file     Emotionally abused: Not on file     Physically abused: Not on file     Forced sexual activity: Not on file   Other Topics Concern    Not on file   Social History Narrative    Not on file       Allergies   Allergen Reactions    Nitrofurantoin Hives     Reaction Date: 02Oct2017;     Pollen Extract Allergic Rhinitis        Current Outpatient Medications:     Blood Pressure Monitoring (BLOOD PRESSURE KIT) POLLY, 1 kit by Does not apply route as needed (for blood pressure checks), Disp: 1 Device, Rfl: 0    Ferrous Fumarate-Vitamin C (CRISTIAN-SEQUELS PO), Take 1 caplet by mouth daily, Disp: , Rfl:     ibuprofen (MOTRIN) 600 mg tablet, Take 1 tablet (600 mg total) by mouth every 6 (six) hours as needed for mild pain, Disp: 30 tablet, Rfl: 1    Prenatal MV-Min-Fe Fum-FA-DHA (PRENATAL+DHA PO), Take 1 tablet by mouth daily, Disp: , Rfl:     aspirin (ECOTRIN LOW STRENGTH) 81 mg EC tablet, Take 81 mg by mouth 2 (two) times a day  , Disp: , Rfl:     docusate sodium (COLACE) 100 mg capsule, Take 1 capsule (100 mg total) by mouth 2 (two) times a day (Patient not taking: Reported on 2/27/2019), Disp: 60 capsule, Rfl: 2    hydrochlorothiazide (HYDRODIURIL) 12 5 mg tablet, Take 1 tablet (12 5 mg total) by mouth daily, Disp: 90 tablet, Rfl: 3    hydrocortisone 1 % cream, Apply 1 application topically as needed for rash (after each bowel movement for skin irritation ) (Patient not taking: Reported on 2/27/2019), Disp: 30 g, Rfl: 0    ondansetron (ZOFRAN-ODT) 8 mg disintegrating tablet, as needed, Disp: , Rfl: 1     Objective   Vitals:    02/27/19 1127   BP: 120/80     Height:5' 2" (1 575 m)  Weight:102 kg (224 lb 3 2 oz)  BMI: Body mass index is 41 01 kg/m²      Physical Exam:  General:  Obese  Awake, alert and in no acute distress  HEENT:  +Glasses  Normocephalic, atraumatic, pupils equally round and reactive to light, extraocular movement intact, conjunctiva clear with no discharge  Ears normally set  Nares patent with no discharge  Mucous membranes moist and oropharynx is clear with no erythema or exudate present  Normal dentition  Chest: Normal without deformity  Neck: supple, symmetric with no masses, no cervical lymphadenopathy  Lungs: clear to auscultation bilaterally with no wheezes, rales or rhonchi  Cardiovascular:   Normal S1 and S2  No murmurs, rubs or gallops  Regular rate and rhythm  Abdomen:  Soft, nontender, and nondistended  Normoactive bowel sounds  No hepatosplenomegaly present  Genitourinary:  Deferred  Back:  Straight without deformity  Skin: warm and well perfused  No rashes present  Extremities:  No cyanosis, clubbing or edema  Pulses 2+ bilaterally  Musculoskeletal:   Full range of motion all four extremities    No joint swelling or tenderness noted  Neurologic: grossly normal neurologic exam with no deficits noted    Psychiatric: normal mood and affect     Lab Results:   Lab Results   Component Value Date    WBC 14 21 (H) 02/07/2019    HGB 7 6 (L) 02/07/2019    HCT 24 4 (L) 02/07/2019    MCV 87 02/07/2019     02/07/2019     Lab Results   Component Value Date    GLUCOSE 87 11/01/2015    CALCIUM 9 1 02/04/2019     11/01/2015    K 5 0 02/04/2019    CO2 17 (L) 02/04/2019     (H) 02/04/2019    BUN 9 02/04/2019    CREATININE 0 64 02/04/2019     Lab Results   Component Value Date    CALCIUM 9 1 02/04/2019    PHOS 3 9 10/21/2014         Imaging: none   Other Studies: none    All laboratory results and imaging was reviewed by me and summarized above

## 2019-02-28 ENCOUNTER — TELEPHONE (OUTPATIENT)
Dept: NEPHROLOGY | Facility: CLINIC | Age: 19
End: 2019-02-28

## 2019-02-28 NOTE — TELEPHONE ENCOUNTER
Per Milady at [address] [place] [address]  child is not covered under this insurance because the primary subscriber is listed under the patients mother  Grandchildren are NOT covered by any means therefor genetic testing is not an option at this time  Mom has been made aware of this and verbalized understanding, there are no questions or concerns at this time       Reference # [id number]

## 2019-03-01 ENCOUNTER — TELEPHONE (OUTPATIENT)
Dept: POSTPARTUM | Facility: CLINIC | Age: 19
End: 2019-03-01

## 2019-03-01 NOTE — TELEPHONE ENCOUNTER
Estephania Kaplan is EBF her 2 week old baby without issues  She will need to be away from him overnight in a few weeks and is trying to pump in order to store milk for that time  She is pumping twice a day between feedings and she is expressing 2 ounces each session  She feels she should be able to express more milk at this time and is asking for help  Her baby is fed on demand, is content and is growing well  When pumping, she is using her Medela Pump in Style with 27mm flanges  She feels she needs an even bigger flange for the left breast since it is still rubbing and uncomfortable  She is not doing hands on pumping or using the cycles of her pump to help remove more milk  I reassured Estephania Kaplan that her milk supply is meeting all of her baby's needs and then some  We reviewed effective hands on pumping, including hand expression and I referred her to the [employer] on both  We also discussed paced bottle feeding and how it can help support breastfeeding  I offered an appointment for further support if needed  Estephania Hernandezor verbalized understanding and will call back as needed

## 2019-03-04 DIAGNOSIS — O10.919 CHRONIC HYPERTENSION AFFECTING PREGNANCY: ICD-10-CM

## 2019-03-04 RX ORDER — HYDROCHLOROTHIAZIDE 12.5 MG/1
12.5 TABLET ORAL DAILY
Qty: 90 TABLET | Refills: 3 | Status: SHIPPED | OUTPATIENT
Start: 2019-03-04 | End: 2019-07-30 | Stop reason: SDUPTHER

## 2019-03-05 NOTE — PROGRESS NOTES
Pediatric Nephrology Follow Up   Paulie Rivera    MYA:[id number]    Date:3/5/2019        Assessment/Plan   Assessment:   57-year-old female with history of  Pseudohypoaldosteronism type 2 here for follow-up post partum  Plan:    Pseudo hypoaldosteronism type 2  Patient Instructions   1  Darnell's syndrome: Blood pressures remain relatively stable   Would like for [facility] to check her blood pressure and continue to monitor trend  Blood work was stable post-partum  Continue on current dose of 12 5 mg for now  Follow up in 3 months  HPI: Wilbert Phillip is a 19 y  o female who presents for follow up of   Chief Complaint   Patient presents with    Follow-up     Wilbert Phillip is accompanied by Her mother who assists in providing the history today  [facility] states that she has been doing well overall since her delivery and her last visit in Nephrology Clinic  She denies any recent emergency room visits  [facility] states that she has continued to take 12 5 mg tablet of hydrochlorothiazide without any issues  She denies regular headaches or dizziness  Has not been checking her blood pressures at home as frequently as she had been prior to delivering  [place] her mother recall fairly well-controlled blood pressures during her hospital stay for her delivery  Trying to encourage her to drink enough fluids during the day per mom  Currently breast feeding exclusively  Adjusting well postpartum  Review of Systems  Constitutional:   Negative for fevers, fatigue or malaise  HEENT: negative for vision or hearing changes, rhinorrhea, congestion or sore throat  Respiratory: negative for cough or shortness of breath? ?  Cardiovascular: negative for chest pain, facial or lower extremity edema  Gastrointestinal: negative for abdominal pain, nausea, vomiting, diarrhea or constipation  Genitourinary: negative for dysuria, hematuria, urgency, frequency or foamy urine  Endocrine: negative for changes in weight  Musculoskeletal: negative for joint pain or swelling, back pain  Neurologic: negative for headache, dizziness  Hematologic: negative for bruising or bleeding  Integumentary: negative for rashes  Psychiatric/Behavioral: no behavioral changes    The remainder of review of systems as noted per HPI  ?           Past Medical History:   Diagnosis Date    Asthma     prn - not used recently    Contraception     Elevated glycated hemoglobin     History of asthma     History of UTI     3 times/yr - last 4/2018    Hypertension     chronic    Injury of foot, right     Injury of knee, right     Injury, ankle     Kidney stone     Late menses     Migraine     1-2 times/month    Obesity     Pain in finger of right hand     Pain in right wrist     Pseudohypoaldosteronism, type 2     Severe headache     Sprain and strain of deltoid (ligament) of ankle      Past Surgical History:   Procedure Laterality Date    TOOTH EXTRACTION      WISDOM TOOTH EXTRACTION        Family History   Problem Relation Age of Onset    Hypertension Mother     Nephrolithiasis Mother     No Known Problems Father     Hypertension Maternal Grandfather     Heart disease Maternal Grandfather     Hypertension Paternal Grandmother     Hypertension Paternal Grandfather     Heart disease Paternal Grandfather     Diabetes Maternal Uncle     Breast cancer Neg Hx         paternal cousin      Social History     Socioeconomic History    Marital status: Single     Spouse name: Not on file    Number of children: Not on file    Years of education: Not on file    Highest education level: Not on file   Occupational History    Not on file   Social Needs    Financial resource strain: Not on file    Food insecurity:     Worry: Not on file     Inability: Not on file    Transportation needs:     Medical: Not on file     Non-medical: Not on file   Tobacco Use    Smoking status: Never Smoker    Smokeless tobacco: Never Used   Substance and Sexual Activity    Alcohol use: No    Drug use: No    Sexual activity: Yes     Partners: Male   Lifestyle    Physical activity:     Days per week: Not on file     Minutes per session: Not on file    Stress: Not on file   Relationships    Social connections:     Talks on phone: Not on file     Gets together: Not on file     Attends Gnosticism service: Not on file     Active member of club or organization: Not on file     Attends meetings of clubs or organizations: Not on file     Relationship status: Not on file    Intimate partner violence:     Fear of current or ex partner: Not on file     Emotionally abused: Not on file     Physically abused: Not on file     Forced sexual activity: Not on file   Other Topics Concern    Not on file   Social History Narrative    Not on file       Allergies   Allergen Reactions    Nitrofurantoin Hives     Reaction Date: [id number];     Pollen Extract Allergic Rhinitis        Current Outpatient Medications:     Blood Pressure Monitoring (BLOOD PRESSURE KIT) POLLY, 1 kit by Does not apply route as needed (for blood pressure checks), Disp: 1 Device, Rfl: 0    Ferrous Fumarate-Vitamin C (CRISTIAN-SEQUELS PO), Take 1 caplet by mouth daily, Disp: , Rfl:     ibuprofen (MOTRIN) 600 mg tablet, Take 1 tablet (600 mg total) by mouth every 6 (six) hours as needed for mild pain, Disp: 30 tablet, Rfl: 1    Prenatal MV-Min-Fe Fum-FA-DHA (PRENATAL+DHA PO), Take 1 tablet by mouth daily, Disp: , Rfl:     aspirin (ECOTRIN LOW STRENGTH) 81 mg EC tablet, Take 81 mg by mouth 2 (two) times a day  , Disp: , Rfl:     docusate sodium (COLACE) 100 mg capsule, Take 1 capsule (100 mg total) by mouth 2 (two) times a day (Patient not taking: Reported on 2/27/2019), Disp: 60 capsule, Rfl: 2    hydrochlorothiazide (HYDRODIURIL) 12 5 mg tablet, Take 1 tablet (12 5 mg total) by mouth daily, Disp: 90 tablet, Rfl: 3    hydrocortisone 1 % cream, Apply 1 application topically as needed for rash (after each bowel movement for skin irritation ) (Patient not taking: Reported on 2/27/2019), Disp: 30 g, Rfl: 0    ondansetron (ZOFRAN-ODT) 8 mg disintegrating tablet, as needed, Disp: , Rfl: 1     Objective   Vitals:    02/27/19 1127   BP: 120/80     Height:5' 2" (1 575 m)  Weight:102 kg (224 lb 3 2 oz)  BMI: Body mass index is 41 01 kg/m²      Physical Exam:  General:  Obese  Awake, alert and in no acute distress  HEENT:  +Glasses  Normocephalic, atraumatic, pupils equally round and reactive to light, extraocular movement intact, conjunctiva clear with no discharge  Ears normally set  Nares patent with no discharge  Mucous membranes moist and oropharynx is clear with no erythema or exudate present  Normal dentition  Chest: Normal without deformity  Neck: supple, symmetric with no masses, no cervical lymphadenopathy  Lungs: clear to auscultation bilaterally with no wheezes, rales or rhonchi  Cardiovascular:   Normal S1 and S2  No murmurs, rubs or gallops  Regular rate and rhythm  Abdomen:  Soft, nontender, and nondistended  Normoactive bowel sounds  No hepatosplenomegaly present  Genitourinary:  Deferred  Back:  Straight without deformity  Skin: warm and well perfused  No rashes present  Extremities:  No cyanosis, clubbing or edema  Pulses 2+ bilaterally  Musculoskeletal:   Full range of motion all four extremities  No joint swelling or tenderness noted  Neurologic: grossly normal neurologic exam with no deficits noted    Psychiatric: normal mood and affect     Lab Results:   Lab Results   Component Value Date    WBC 14 21 (H) 02/07/2019    HGB 7 6 (L) 02/07/2019    HCT 24 4 (L) 02/07/2019    MCV 87 02/07/2019     02/07/2019     Lab Results   Component Value Date    GLUCOSE 87 11/01/2015    CALCIUM 9 1 02/04/2019     11/01/2015    K 5 0 02/04/2019    CO2 17 (L) 02/04/2019     (H) 02/04/2019    BUN 9 02/04/2019    CREATININE 0 64 02/04/2019     Lab Results   Component Value Date    CALCIUM 9 1 02/04/2019    PHOS 3 9 10/21/2014         Imaging: none   Other Studies: none    All laboratory results and imaging was reviewed by me and summarized above

## 2019-03-18 ENCOUNTER — POSTPARTUM VISIT (OUTPATIENT)
Dept: OBGYN CLINIC | Facility: CLINIC | Age: 19
End: 2019-03-18

## 2019-03-18 VITALS
DIASTOLIC BLOOD PRESSURE: 78 MMHG | BODY MASS INDEX: 42.36 KG/M2 | SYSTOLIC BLOOD PRESSURE: 128 MMHG | WEIGHT: 230.2 LBS | HEIGHT: 62 IN

## 2019-03-18 PROBLEM — Z3A.37 37 WEEKS GESTATION OF PREGNANCY: Status: RESOLVED | Noted: 2018-08-12 | Resolved: 2019-03-18

## 2019-03-18 PROBLEM — R31.0 GROSS HEMATURIA: Status: RESOLVED | Noted: 2018-11-27 | Resolved: 2019-03-18

## 2019-03-18 PROCEDURE — 99024 POSTOP FOLLOW-UP VISIT: CPT | Performed by: OBSTETRICS & GYNECOLOGY

## 2019-03-18 NOTE — PROGRESS NOTES
This is a 43-year-old white female, she is approximately 6 weeks status post vacuum assisted vaginal delivery  She is doing well  She is still nursing  She is also pumping  She denies any problem with postpartum depression or baby blues  Her bleeding has now stopped  She denies any major  GI complaint  She is contemplating using the 3 year intrauterine device for contraception  Currently she will use condoms  She continues to lose weight  Her infant is thriving and doing well  Examination of the breasts her abdomen and the pelvis all within normal limits  The patient will let us know her decision for method of contraception  She will continue to follow with her primary physician for hypertension which is good today  She return to office in 6 months for yearly exam with no other issues

## 2019-05-07 ENCOUNTER — PROCEDURE VISIT (OUTPATIENT)
Dept: OBGYN CLINIC | Facility: CLINIC | Age: 19
End: 2019-05-07

## 2019-05-07 VITALS — BODY MASS INDEX: 43.58 KG/M2 | HEIGHT: 62 IN | WEIGHT: 236.8 LBS

## 2019-05-07 DIAGNOSIS — Z30.430 ENCOUNTER FOR INSERTION OF INTRAUTERINE CONTRACEPTIVE DEVICE (IUD): Primary | ICD-10-CM

## 2019-05-28 ENCOUNTER — OFFICE VISIT (OUTPATIENT)
Dept: NEPHROLOGY | Facility: CLINIC | Age: 19
End: 2019-05-28
Payer: COMMERCIAL

## 2019-05-28 ENCOUNTER — APPOINTMENT (OUTPATIENT)
Dept: LAB | Facility: HOSPITAL | Age: 19
End: 2019-05-28
Attending: PEDIATRICS
Payer: COMMERCIAL

## 2019-05-28 ENCOUNTER — TRANSCRIBE ORDERS (OUTPATIENT)
Dept: LAB | Facility: HOSPITAL | Age: 19
End: 2019-05-28

## 2019-05-28 VITALS
SYSTOLIC BLOOD PRESSURE: 118 MMHG | HEIGHT: 63 IN | DIASTOLIC BLOOD PRESSURE: 62 MMHG | HEART RATE: 62 BPM | BODY MASS INDEX: 40.86 KG/M2 | WEIGHT: 230.6 LBS

## 2019-05-28 DIAGNOSIS — Z00.00 ROUTINE GENERAL MEDICAL EXAMINATION AT A HEALTH CARE FACILITY: Primary | ICD-10-CM

## 2019-05-28 DIAGNOSIS — N25.89 PSEUDOHYPOALDOSTERONISM, TYPE 2: Primary | ICD-10-CM

## 2019-05-28 LAB
ALBUMIN SERPL BCP-MCNC: 3.5 G/DL (ref 3.5–5)
ALP SERPL-CCNC: 121 U/L (ref 46–116)
ALT SERPL W P-5'-P-CCNC: 19 U/L (ref 12–78)
ANION GAP SERPL CALCULATED.3IONS-SCNC: 4 MMOL/L (ref 4–13)
AST SERPL W P-5'-P-CCNC: 13 U/L (ref 5–45)
BILIRUB SERPL-MCNC: 0.16 MG/DL (ref 0.2–1)
BUN SERPL-MCNC: 11 MG/DL (ref 5–25)
CALCIUM SERPL-MCNC: 8.7 MG/DL (ref 8.3–10.1)
CHLORIDE SERPL-SCNC: 113 MMOL/L (ref 100–108)
CO2 SERPL-SCNC: 24 MMOL/L (ref 21–32)
CREAT SERPL-MCNC: 0.67 MG/DL (ref 0.6–1.3)
GFR SERPL CREATININE-BSD FRML MDRD: 127 ML/MIN/1.73SQ M
GLUCOSE SERPL-MCNC: 106 MG/DL (ref 65–140)
POTASSIUM SERPL-SCNC: 5.4 MMOL/L (ref 3.5–5.3)
PROT SERPL-MCNC: 7 G/DL (ref 6.4–8.2)
SODIUM SERPL-SCNC: 141 MMOL/L (ref 136–145)

## 2019-05-28 PROCEDURE — 36415 COLL VENOUS BLD VENIPUNCTURE: CPT

## 2019-05-28 PROCEDURE — 99213 OFFICE O/P EST LOW 20 MIN: CPT | Performed by: PEDIATRICS

## 2019-05-28 PROCEDURE — 80053 COMPREHEN METABOLIC PANEL: CPT

## 2019-05-28 RX ORDER — ALBUTEROL SULFATE 90 UG/1
AEROSOL, METERED RESPIRATORY (INHALATION) EVERY 6 HOURS PRN
Refills: 12 | COMMUNITY
Start: 2019-05-20 | End: 2020-11-03 | Stop reason: ALTCHOICE

## 2019-06-04 ENCOUNTER — TELEPHONE (OUTPATIENT)
Dept: NEPHROLOGY | Facility: CLINIC | Age: 19
End: 2019-06-04

## 2019-06-10 ENCOUNTER — PROCEDURE VISIT (OUTPATIENT)
Dept: OBGYN CLINIC | Facility: CLINIC | Age: 19
End: 2019-06-10
Payer: COMMERCIAL

## 2019-06-10 VITALS
SYSTOLIC BLOOD PRESSURE: 146 MMHG | BODY MASS INDEX: 43.39 KG/M2 | HEIGHT: 62 IN | DIASTOLIC BLOOD PRESSURE: 74 MMHG | WEIGHT: 235.8 LBS

## 2019-06-10 DIAGNOSIS — Z30.430 ENCOUNTER FOR IUD INSERTION: Primary | ICD-10-CM

## 2019-06-10 PROCEDURE — 58300 INSERT INTRAUTERINE DEVICE: CPT | Performed by: OBSTETRICS & GYNECOLOGY

## 2019-06-13 ENCOUNTER — TELEPHONE (OUTPATIENT)
Dept: NEPHROLOGY | Facility: CLINIC | Age: 19
End: 2019-06-13

## 2019-06-17 NOTE — OB LABOR/OXYTOCIN SAFETY PROGRESS
Oxytocin Safety Progress Check Note - Deborah Barker 19 y o  female MRN: [id number]    Unit/Bed#: -01 Encounter: [id number]    Obstetric History       T0      L0     SAB0   TAB0   Ectopic0   Multiple0   Live Births0      Gestational Age: 38w0d  Dose (essence-units/min) Oxytocin: 30 essence-units/min  Contraction Frequency (minutes): 2-3  Contraction Quality: Mild  Tachysystole: No   Dilation: 5        Effacement (%): 80  Station: -1  Baseline Rate: 145 bpm  Fetal Heart Rate: 140 BPM  FHR Category: Category I          Notes/comments:   Patient is comfortable with her epidural   Aviles balloon came out with assistance of manual traction  Patient is significantly changed from her previous exam   Will plan for AROM  Discussed with Dr Milo Shah MD 2019 10:39 PM Home

## 2019-07-15 ENCOUNTER — OFFICE VISIT (OUTPATIENT)
Dept: OBGYN CLINIC | Facility: CLINIC | Age: 19
End: 2019-07-15
Payer: COMMERCIAL

## 2019-07-15 VITALS
HEIGHT: 62 IN | DIASTOLIC BLOOD PRESSURE: 84 MMHG | WEIGHT: 238 LBS | SYSTOLIC BLOOD PRESSURE: 126 MMHG | BODY MASS INDEX: 43.79 KG/M2

## 2019-07-15 DIAGNOSIS — Z30.431 IUD CHECK UP: Primary | ICD-10-CM

## 2019-07-15 PROBLEM — Z87.59 STATUS POST VACUUM-ASSISTED VAGINAL DELIVERY: Status: RESOLVED | Noted: 2019-02-06 | Resolved: 2019-07-15

## 2019-07-15 PROBLEM — O10.919 CHRONIC HYPERTENSION AFFECTING PREGNANCY: Status: RESOLVED | Noted: 2018-08-12 | Resolved: 2019-07-15

## 2019-07-15 PROCEDURE — 99213 OFFICE O/P EST LOW 20 MIN: CPT | Performed by: OBSTETRICS & GYNECOLOGY

## 2019-07-15 NOTE — PROGRESS NOTES
This is a 77-year-old white female now returns for IUD follow-up  This is inserted approximately 5 weeks ago  There is no problem with intercourse still some irregular bleeding which is improving  Transabdominal ultrasound shows proper placement of the IUD without evidence perforation  The cervix is closed IUD string is seen  The patient is doing well  She will keep her yearly exam for September

## 2019-07-15 NOTE — PATIENT INSTRUCTIONS
Doing well off the IUD insertion  Bleeding is improving  Exam was normal   IUD string was seen    Return to office in 3 months for yearly exam

## 2019-07-30 ENCOUNTER — APPOINTMENT (OUTPATIENT)
Dept: LAB | Age: 19
End: 2019-07-30
Payer: COMMERCIAL

## 2019-07-30 ENCOUNTER — OFFICE VISIT (OUTPATIENT)
Dept: NEPHROLOGY | Facility: CLINIC | Age: 19
End: 2019-07-30
Payer: COMMERCIAL

## 2019-07-30 VITALS
HEIGHT: 63 IN | HEART RATE: 72 BPM | SYSTOLIC BLOOD PRESSURE: 114 MMHG | WEIGHT: 241 LBS | DIASTOLIC BLOOD PRESSURE: 68 MMHG | BODY MASS INDEX: 42.7 KG/M2

## 2019-07-30 DIAGNOSIS — N25.89 PSEUDOHYPOALDOSTERONISM, TYPE 2: ICD-10-CM

## 2019-07-30 DIAGNOSIS — N25.89 PSEUDOHYPOALDOSTERONISM, TYPE 2: Primary | ICD-10-CM

## 2019-07-30 LAB
ANION GAP SERPL CALCULATED.3IONS-SCNC: 4 MMOL/L (ref 4–13)
BUN SERPL-MCNC: 12 MG/DL (ref 5–25)
CALCIUM SERPL-MCNC: 8.9 MG/DL (ref 8.3–10.1)
CHLORIDE SERPL-SCNC: 110 MMOL/L (ref 100–108)
CO2 SERPL-SCNC: 23 MMOL/L (ref 21–32)
CREAT SERPL-MCNC: 0.66 MG/DL (ref 0.6–1.3)
GFR SERPL CREATININE-BSD FRML MDRD: 127 ML/MIN/1.73SQ M
GLUCOSE SERPL-MCNC: 99 MG/DL (ref 65–140)
POTASSIUM SERPL-SCNC: 5.3 MMOL/L (ref 3.5–5.3)
SODIUM SERPL-SCNC: 137 MMOL/L (ref 136–145)

## 2019-07-30 PROCEDURE — 80048 BASIC METABOLIC PNL TOTAL CA: CPT

## 2019-07-30 PROCEDURE — 36415 COLL VENOUS BLD VENIPUNCTURE: CPT

## 2019-07-30 PROCEDURE — 99214 OFFICE O/P EST MOD 30 MIN: CPT | Performed by: PEDIATRICS

## 2019-07-30 RX ORDER — HYDROCHLOROTHIAZIDE 12.5 MG/1
12.5 TABLET ORAL DAILY
Qty: 90 TABLET | Refills: 3 | Status: SHIPPED | OUTPATIENT
Start: 2019-07-30 | End: 2019-11-11 | Stop reason: SDUPTHER

## 2019-07-30 NOTE — LETTER
July 30, 2019     E[facility] [address] [address]    Patient: Augusto Ness   YOB: 2000   Date of Visit: 7/30/2019       Dear Dr Sylvain Villeda: Thank you for referring Raj Obregon to me for evaluation  Below are my notes for this consultation  If you have questions, please do not hesitate to call me  I look forward to following your patient along with you  Sincerely,        Robert Rogers MD        CC: No Recipients  Robert Rogers MD  7/30/2019 11:06 AM  Sign at close encounter    Pediatric Nephrology Follow Up   Emily Burton    XLT:[id number]    Date:7/30/2019        Assessment/Plan   Assessment:  22year old female with pseudohypoaldosteronism type II here for follow up  Plan:  Diagnoses and all orders for this visit:    Pseudohypoaldosteronism, type 2  -     hydrochlorothiazide (HYDRODIURIL) 12 5 mg tablet; Take 1 tablet (12 5 mg total) by mouth daily  -     Basic metabolic panel; Future      Patient Instructions   1  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   Would like for Laurence to continue to intermittently check her blood pressure and continue to monitor trend- send BPs in for review in 1 week    Repeat BMP to reassess potassium   Continue on current dose of 12 5 mg for now (refills provided today) and if home BPs trend up or if BMP is abnormal, will adjust medications     Will transition to adult nephrology and plan for follow up in 4 months  HPI: Augusto Ness is a 22 y  o female who presents for follow up of   Chief Complaint   Patient presents with    Follow-up     Laurence has been doing well overall with no complaints  States that she feels overall well with her medication regimen  No missed doses of her hydrochlorothiazide  Intermittently checking her blood pressures at this time  Blood pressures on average have been 120s to 130s/ 70 to 80s    No complaints of dizziness  Had one migraine several days ago  No recent fevers or illnesses  Obtain a new job and is planning on starting school again this upcoming month with 18 credits this semester  Trying to stay well hydrated with HCTZ and denies any issues at this time  Recently had IUD placed for contraception control per [facility]  Review of Systems  Constitutional:   Negative for fevers, fatigue   HEENT: negative for rhinorrhea, congestion or sore throat  Respiratory: negative for cough or shortness of breath? ?  Cardiovascular: negative for chest pain, facial or lower extremity edema  Gastrointestinal: negative for abdominal pain, nausea, vomiting, diarrhea or constipation  Genitourinary: negative for dysuria, hematuria, urgency, frequency or foamy urine  Endocrine: negative for changes in weight  Musculoskeletal: negative for joint pain or swelling, back pain  Neurologic: negative for dizziness  See HPI  Hematologic: negative for bruising or bleeding  Integumentary: negative for rashes  Psychiatric/Behavioral: no behavioral changes    The remainder of review of systems as noted per HPI  ?           Past Medical History:   Diagnosis Date    Asthma     prn - not used recently    Contraception     Elevated glycated hemoglobin     History of asthma     History of UTI     3 times/yr - last 4/2018    Hypertension     chronic    Injury of foot, right     Injury of knee, right     Injury, ankle     Kidney stone     Late menses     Migraine     1-2 times/month    Obesity     Pain in finger of right hand     Pain in right wrist     Pseudohypoaldosteronism, type 2     Severe headache     Sprain and strain of deltoid (ligament) of ankle      Past Surgical History:   Procedure Laterality Date    TOOTH EXTRACTION      WISDOM TOOTH EXTRACTION        Family History   Problem Relation Age of Onset    Hypertension Mother     Nephrolithiasis Mother     No Known Problems Father     Hypertension Maternal Grandfather     Heart disease Maternal Grandfather     Hypertension Paternal Grandmother     Hypertension Paternal Grandfather     Heart disease Paternal Grandfather     Diabetes Maternal Uncle     Breast cancer Neg Hx         paternal cousin      Social History     Socioeconomic History    Marital status: Single     Spouse name: Not on file    Number of children: Not on file    Years of education: Not on file    Highest education level: Not on file   Occupational History    Not on file   Social Needs    Financial resource strain: Not on file    Food insecurity:     Worry: Not on file     Inability: Not on file    Transportation needs:     Medical: Not on file     Non-medical: Not on file   Tobacco Use    Smoking status: Never Smoker    Smokeless tobacco: Never Used   Substance and Sexual Activity    Alcohol use: No    Drug use: No    Sexual activity: Not Currently     Partners: Male   Lifestyle    Physical activity:     Days per week: Not on file     Minutes per session: Not on file    Stress: Not on file   Relationships    Social connections:     Talks on phone: Not on file     Gets together: Not on file     Attends Mandaen service: Not on file     Active member of club or organization: Not on file     Attends meetings of clubs or organizations: Not on file     Relationship status: Not on file    Intimate partner violence:     Fear of current or ex partner: Not on file     Emotionally abused: Not on file     Physically abused: Not on file     Forced sexual activity: Not on file   Other Topics Concern    Not on file   Social History Narrative    Not on file       Allergies   Allergen Reactions    Nitrofurantoin Hives     Reaction Date: 02Oct2017;     Pollen Extract Allergic Rhinitis        Current Outpatient Medications:     albuterol (PROVENTIL HFA,VENTOLIN HFA) 90 mcg/act inhaler, INHALE 1-2 PUFFS BY MOUTH 4 TIMES A DAY, Disp: , Rfl: 12    Blood Pressure Monitoring (BLOOD PRESSURE KIT) POLLY, 1 kit by Does not apply route as needed (for blood pressure checks), Disp: 1 Device, Rfl: 0    hydrochlorothiazide (HYDRODIURIL) 12 5 mg tablet, Take 1 tablet (12 5 mg total) by mouth daily, Disp: 90 tablet, Rfl: 3    levonorgestrel (KYLEENA) 19 5 MG intrauterine device, 1 Intra Uterine Device by Intrauterine route once, Disp: , Rfl:     ondansetron (ZOFRAN-ODT) 8 mg disintegrating tablet, as needed, Disp: , Rfl: 1    aspirin (ECOTRIN LOW STRENGTH) 81 mg EC tablet, Take 81 mg by mouth 2 (two) times a day  , Disp: , Rfl:     docusate sodium (COLACE) 100 mg capsule, Take 1 capsule (100 mg total) by mouth 2 (two) times a day (Patient not taking: Reported on 2/27/2019), Disp: 60 capsule, Rfl: 2    Ferrous Fumarate-Vitamin C (CRISTIAN-SEQUELS PO), Take 1 caplet by mouth daily, Disp: , Rfl:     hydrocortisone 1 % cream, Apply 1 application topically as needed for rash (after each bowel movement for skin irritation ) (Patient not taking: Reported on 2/27/2019), Disp: 30 g, Rfl: 0    ibuprofen (MOTRIN) 600 mg tablet, Take 1 tablet (600 mg total) by mouth every 6 (six) hours as needed for mild pain (Patient not taking: Reported on 7/15/2019), Disp: 30 tablet, Rfl: 1    Prenatal MV-Min-Fe Fum-FA-DHA (PRENATAL+DHA PO), Take 1 tablet by mouth daily, Disp: , Rfl:      Objective   Vitals:    07/30/19 0923   BP: 114/68   Pulse: 72     Height:5' 3" (1 6 m)  Weight:109 kg (241 lb)  BMI: Body mass index is 42 69 kg/m²      Physical Exam:  General: Obese  Awake, alert and in no acute distress  HEENT:  +Glasses  Normocephalic, atraumatic, pupils equally round and reactive to light, extraocular movement intact, conjunctiva clear with no discharge  Ears normally set with tympanic membranes visualized  Tympanic membranes without erythema or effusion and canals clear  Nares patent with no discharge  Mucous membranes moist and oropharynx is clear with no erythema or exudate present  Normal dentition    Chest: Normal without deformity  Neck: supple, symmetric with no masses, no cervical lymphadenopathy  Lungs: clear to auscultation bilaterally with no wheezes, rales or rhonchi  Cardiovascular:   Normal S1 and S2  No murmurs, rubs or gallops  Regular rate and rhythm  Abdomen:  Soft, nontender, and nondistended  Normoactive bowel sounds  No hepatosplenomegaly present  Genitourinary:  Deferred  Back:  Straight without deformity  Skin: warm and well perfused  No rashes present  Extremities:  No cyanosis, clubbing or edema  Pulses 2+ bilaterally  Musculoskeletal:   Full range of motion all four extremities  No joint swelling or tenderness noted  Neurologic: grossly normal neurologic exam with no deficits noted    Psychiatric: normal mood and affect     Lab Results:     Lab Results   Component Value Date    GLUCOSE 87 11/01/2015    CALCIUM 8 7 05/28/2019     11/01/2015    K 5 4 (H) 05/28/2019    CO2 24 05/28/2019     (H) 05/28/2019    BUN 11 05/28/2019    CREATININE 0 67 05/28/2019     Lab Results   Component Value Date    CALCIUM 8 7 05/28/2019    PHOS 3 9 10/21/2014         Imaging: none  Other Studies: none    All laboratory results and imaging was reviewed by me and summarized above

## 2019-07-30 NOTE — PATIENT INSTRUCTIONS
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

## 2019-07-31 ENCOUNTER — TELEPHONE (OUTPATIENT)
Dept: NEPHROLOGY | Facility: CLINIC | Age: 19
End: 2019-07-31

## 2019-07-31 NOTE — TELEPHONE ENCOUNTER
----- Message from Liya Bush MD sent at 7/31/2019  7:54 AM EDT -----  Please let her know that labs are stable

## 2019-08-05 ENCOUNTER — TELEPHONE (OUTPATIENT)
Dept: PEDIATRICS CLINIC | Facility: CLINIC | Age: 19
End: 2019-08-05

## 2019-08-05 NOTE — TELEPHONE ENCOUNTER
----- Message from Laura Jauregui MD sent at 8/5/2019  9:29 AM EDT -----  Regarding: RE: Non-Urgent Medical Question  Contact: [phone]  Can someone reach out to this mom to have her son scheduled for his well child check?     ----- Message -----  From: Escobar Mauro  Sent: 8/5/2019   9:05 AM EDT  To: Laura Jauregui MD  Subject: FW: Non-Urgent Medical Question                      ----- Message -----  From: Ena Banks  Sent: 8/5/2019   8:59 AM EDT  To: [facility] [place]  Subject: Non-Urgent Freddy Elias,     Sorry to bother you, but I just called [facility] to try and get [facility] in with you on Thursday and they said youre already booked  I just wanted to double check because they lady responded so quickly to my question it didnt even sound like she checked  If you are its not problem but I wanted to check with you before I called back asking more questions  If you dont know thats fine to and Ill still call them to double check  Thanks!    Oniel Speaks

## 2019-09-23 ENCOUNTER — ANNUAL EXAM (OUTPATIENT)
Dept: OBGYN CLINIC | Facility: CLINIC | Age: 19
End: 2019-09-23
Payer: COMMERCIAL

## 2019-09-23 VITALS
DIASTOLIC BLOOD PRESSURE: 76 MMHG | BODY MASS INDEX: 43.34 KG/M2 | SYSTOLIC BLOOD PRESSURE: 116 MMHG | WEIGHT: 244.6 LBS | HEIGHT: 63 IN

## 2019-09-23 DIAGNOSIS — Z01.419 WOMEN'S ANNUAL ROUTINE GYNECOLOGICAL EXAMINATION: Primary | ICD-10-CM

## 2019-09-23 PROCEDURE — G0145 SCR C/V CYTO,THINLAYER,RESCR: HCPCS | Performed by: OBSTETRICS & GYNECOLOGY

## 2019-09-23 PROCEDURE — 99395 PREV VISIT EST AGE 18-39: CPT | Performed by: OBSTETRICS & GYNECOLOGY

## 2019-09-23 NOTE — PATIENT INSTRUCTIONS
Patient was informed of a stable gyn examination  She was also aware the IUD string had retracted  Transabdominal ultrasound showed proper placement of the IUD without evidence of perforation  We discussion about her increased weight  She will strongly consider make an appointment for the Hospital medical weight loss program   A Pap smear was performed she should return my office in 1 year

## 2019-09-23 NOTE — PROGRESS NOTES
Assessment/Plan:    The patient was informed of a stable gyn examination  We had a discussion about her weight and increased BMI  I suggested she consider the Hospital medical weight loss program   She was X excited about this and states she will call us upon appointment  The IUD string could not be seen from the cervix  Transabdominal ultrasound showed that the IUD was in place without evidence of perforation  There is no cervical motion tenderness or pain  The patient is aware of this fact  We will address is fact later artery each yearly visit  A Pap smear was performed she should return my office in 1 year  Subjective:      Patient ID: Marilee Wilson is a 22 y o  female  HPI    This is a 68-year-old white female, she is a  1 para 1 1 vaginal delivery approximately 8 months ago  Her current method of contraception includes the IUD  She denies any major gynecological  GI complaint  There is no problem with intimacy  She is happy with her current method of contraception  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, 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  She is taking Hydrea diarrheal for elevated blood pressure  This is working well for her blood pressure, she had a normal blood pressure today  She still 12 by increasing weight, her BMI is 43 3 today  She denies any problem with intimacy  There are no new major family illnesses report this time  The following portions of the patient's history were reviewed and updated as appropriate: allergies, current medications, past family history, past medical history, past social history, past surgical history and problem list     Review of Systems      Objective:      /76   Ht 5' 3" (1 6 m)   Wt 111 kg (244 lb 9 6 oz)   LMP 2019 (Exact Date)   BMI 43 33 kg/m²          Physical Exam   Constitutional: She is oriented to person, place, and time  She appears well-developed and well-nourished     HENT:   Head: Normocephalic and atraumatic  Neck: Normal range of motion  Neck supple  Cardiovascular: Normal rate, regular rhythm and normal heart sounds  Pulmonary/Chest: Effort normal and breath sounds normal    Abdominal: Soft  Bowel sounds are normal  She exhibits no distension and no mass  There is no tenderness  There is no rebound and no guarding  No hernia  Hernia confirmed negative in the right inguinal area and confirmed negative in the left inguinal area  Genitourinary: Rectum normal, vagina normal and uterus normal  No labial fusion  There is no rash, tenderness, lesion or injury on the right labia  There is no rash, tenderness, lesion or injury on the left labia  Uterus is not deviated, not enlarged, not fixed and not tender  Cervix exhibits no motion tenderness, no discharge and no friability  Right adnexum displays no mass, no tenderness and no fullness  Left adnexum displays no mass, no tenderness and no fullness  No erythema, tenderness or bleeding in the vagina  No foreign body in the vagina  No signs of injury around the vagina  No vaginal discharge found  Genitourinary Comments: A Pap smear was performed  The IUD string could not be seen  The adnexa was clear bilaterally  The patient states her menstrual cycles are consistent with the IUD being present  A transabdominal ultrasound showed the IUD was present a picture was taken  This is shown to the patient  There was no evidence of perforation  Musculoskeletal: Normal range of motion  Lymphadenopathy: No inguinal adenopathy noted on the right or left side  Neurological: She is alert and oriented to person, place, and time  Skin: Skin is warm and dry  Psychiatric: She has a normal mood and affect   Her behavior is normal

## 2019-09-26 LAB
LAB AP GYN PRIMARY INTERPRETATION: NORMAL
LAB AP LMP: NORMAL
Lab: NORMAL

## 2019-10-11 ENCOUNTER — OFFICE VISIT (OUTPATIENT)
Dept: URGENT CARE | Age: 19
End: 2019-10-11
Payer: COMMERCIAL

## 2019-10-11 ENCOUNTER — APPOINTMENT (OUTPATIENT)
Dept: RADIOLOGY | Age: 19
End: 2019-10-11
Attending: FAMILY MEDICINE
Payer: COMMERCIAL

## 2019-10-11 VITALS
RESPIRATION RATE: 20 BRPM | HEIGHT: 62 IN | WEIGHT: 244 LBS | OXYGEN SATURATION: 98 % | TEMPERATURE: 97.4 F | BODY MASS INDEX: 44.9 KG/M2 | HEART RATE: 91 BPM | DIASTOLIC BLOOD PRESSURE: 59 MMHG | SYSTOLIC BLOOD PRESSURE: 126 MMHG

## 2019-10-11 DIAGNOSIS — T14.90XA INJURY: ICD-10-CM

## 2019-10-11 DIAGNOSIS — S93.402A SPRAIN OF LEFT ANKLE, UNSPECIFIED LIGAMENT, INITIAL ENCOUNTER: Primary | ICD-10-CM

## 2019-10-11 PROCEDURE — 99213 OFFICE O/P EST LOW 20 MIN: CPT | Performed by: PHYSICIAN ASSISTANT

## 2019-10-11 PROCEDURE — 73630 X-RAY EXAM OF FOOT: CPT

## 2019-10-11 PROCEDURE — S9088 SERVICES PROVIDED IN URGENT: HCPCS | Performed by: PHYSICIAN ASSISTANT

## 2019-10-11 PROCEDURE — 73610 X-RAY EXAM OF ANKLE: CPT

## 2019-10-11 NOTE — PROGRESS NOTES
[facility]        NAME: Lashay Holguin is a 22 y o  female  : 2000    MRN: [id number]  DATE: 2019  TIME: 5:37 PM    Assessment and Plan   Sprain of left ankle, unspecified ligament, initial encounter [S93 402A]  1  Sprain of left ankle, unspecified ligament, initial encounter  XR foot 3+ vw left    XR ankle 3+ vw left    Ambulatory referral to Podiatry         Patient Instructions       Follow up with PCP in 3-5 days  Proceed to  ER if symptoms worsen  Chief Complaint     Chief Complaint   Patient presents with    Foot Injury     FELL DOWN STAIRS AND HURT LEFT FOOT / [place]   Ankle Injury         History of Present Illness       Patient for evaluation of pain and swelling in her left lateral ankle  Does have a history of prior ankle injuries with the last 1 the left being at least 3 years ago  She was going up the steps and she twisted as shoe was turning quickly and overturned her foot ankle  Review of Systems   Review of Systems   Constitutional: Negative  Musculoskeletal: Positive for arthralgias and gait problem  Skin: Negative for color change and wound  Neurological: Negative for numbness           Current Medications       Current Outpatient Medications:     albuterol (PROVENTIL HFA,VENTOLIN HFA) 90 mcg/act inhaler, INHALE 1-2 PUFFS BY MOUTH 4 TIMES A DAY, Disp: , Rfl: 12    aspirin (ECOTRIN LOW STRENGTH) 81 mg EC tablet, Take 81 mg by mouth 2 (two) times a day  , Disp: , Rfl:     Blood Pressure Monitoring (BLOOD PRESSURE KIT) POLLY, 1 kit by Does not apply route as needed (for blood pressure checks) (Patient not taking: Reported on 2019), Disp: 1 Device, Rfl: 0    docusate sodium (COLACE) 100 mg capsule, Take 1 capsule (100 mg total) by mouth 2 (two) times a day (Patient not taking: Reported on 2019), Disp: 60 capsule, Rfl: 2    Ferrous Fumarate-Vitamin C (CRISTIAN-SEQUELS PO), Take 1 caplet by mouth daily, Disp: , Rfl:   hydrochlorothiazide (HYDRODIURIL) 12 5 mg tablet, Take 1 tablet (12 5 mg total) by mouth daily, Disp: 90 tablet, Rfl: 3    hydrocortisone 1 % cream, Apply 1 application topically as needed for rash (after each bowel movement for skin irritation ) (Patient not taking: Reported on 2/27/2019), Disp: 30 g, Rfl: 0    ibuprofen (MOTRIN) 600 mg tablet, Take 1 tablet (600 mg total) by mouth every 6 (six) hours as needed for mild pain (Patient not taking: Reported on 7/15/2019), Disp: 30 tablet, Rfl: 1    levonorgestrel (KYLEENA) 19 5 MG intrauterine device, 1 Intra Uterine Device by Intrauterine route once, Disp: , Rfl:     ondansetron (ZOFRAN-ODT) 8 mg disintegrating tablet, as needed, Disp: , Rfl: 1    Prenatal MV-Min-Fe Fum-FA-DHA (PRENATAL+DHA PO), Take 1 tablet by mouth daily, Disp: , Rfl:     Current Allergies     Allergies as of 10/11/2019 - Reviewed 10/11/2019   Allergen Reaction Noted    Nitrofurantoin Hives 10/16/2017    Pollen extract Allergic Rhinitis 12/21/2016            The following portions of the patient's history were reviewed and updated as appropriate: allergies, current medications, past family history, past medical history, past social history, past surgical history and problem list      Past Medical History:   Diagnosis Date    Asthma     prn - not used recently    Contraception     Elevated glycated hemoglobin     History of asthma     History of UTI     3 times/yr - last 4/2018    Hypertension     chronic    Injury of foot, right     Injury of knee, right     Injury, ankle     Kidney stone     Late menses     Migraine     1-2 times/month    Obesity     Pain in finger of right hand     Pain in right wrist     Pseudohypoaldosteronism, type 2     Severe headache     Sprain and strain of deltoid (ligament) of ankle        Past Surgical History:   Procedure Laterality Date    TOOTH EXTRACTION      WISDOM TOOTH EXTRACTION         Family History   Problem Relation Age of Onset    Hypertension Mother     Nephrolithiasis Mother     No Known Problems Father     Hypertension Maternal Grandfather     Heart disease Maternal Grandfather     Hypertension Paternal Grandmother     Hypertension Paternal Grandfather     Heart disease Paternal Grandfather     Diabetes Maternal Uncle     Breast cancer Neg Hx         paternal cousin          Medications have been verified  Objective   /59 (BP Location: Right arm, Patient Position: Sitting, Cuff Size: Extra-Large)   Pulse 91   Temp (!) 97 4 °F (36 3 °C) (Temporal)   Resp 20   Ht 5' 2" (1 575 m)   Wt 111 kg (244 lb)   LMP 09/14/2019 (Exact Date)   SpO2 98%   BMI 44 63 kg/m²        Physical Exam     Physical Exam   Constitutional: She is oriented to person, place, and time  She appears well-developed and well-nourished  No distress  HENT:   Head: Normocephalic and atraumatic  Musculoskeletal:   Range of motion of the left foot ankle intact but limited  There is focal soft tissue swelling of the lateral malleolus  There is some mild ecchymosis present  Positive anterior drawer sign on the left compared to the right  Neurovascularly intact  Neurological: She is alert and oriented to person, place, and time  Skin: Skin is warm and dry  No rash noted  She is not diaphoretic  Psychiatric: She has a normal mood and affect  Her behavior is normal  Judgment and thought content normal    Nursing note and vitals reviewed  X-ray of the left foot ankle show no acute fractures

## 2019-10-11 NOTE — PATIENT INSTRUCTIONS
Rest injured body part  Ibuprofen as directed    Ice 10-15 minutes off and on every 3-4 hours while awake for 48 hours after injury  After 48 hours you may start using warm compresses if appropriate  Compression use Ace wrap for support as needed  DO NOT wear to bed  Air cast as directed     Elevate injured body part as able to help decrease pain and swelling  Follow-up with your podiatrist for further evaluation  Do not bear weight until you are re-evaluated by Orthopedics  Do not rest your axilla (underarm) on the top of the crutches  Support your weight on the hand

## 2019-10-11 NOTE — LETTER
October 11, 2019     Patient: Caden Monge   YOB: 2000   Date of Visit: 10/11/2019       To Whom It May Concern:    Please excuse Santi Stafford from work 10/11/2019 - 10/18/2019 due to injury           Sincerely,        Jordan Grimes PA-C    CC: No Recipients

## 2019-11-11 ENCOUNTER — OFFICE VISIT (OUTPATIENT)
Dept: NEPHROLOGY | Facility: CLINIC | Age: 19
End: 2019-11-11
Payer: COMMERCIAL

## 2019-11-11 VITALS
RESPIRATION RATE: 18 BRPM | SYSTOLIC BLOOD PRESSURE: 104 MMHG | BODY MASS INDEX: 42.7 KG/M2 | HEART RATE: 86 BPM | WEIGHT: 241 LBS | HEIGHT: 63 IN | DIASTOLIC BLOOD PRESSURE: 64 MMHG

## 2019-11-11 DIAGNOSIS — N25.89 PSEUDOHYPOALDOSTERONISM, TYPE 2: Primary | ICD-10-CM

## 2019-11-11 DIAGNOSIS — N13.30 HYDRONEPHROSIS, UNSPECIFIED HYDRONEPHROSIS TYPE: ICD-10-CM

## 2019-11-11 DIAGNOSIS — D64.9 ANEMIA, UNSPECIFIED TYPE: ICD-10-CM

## 2019-11-11 PROCEDURE — 99214 OFFICE O/P EST MOD 30 MIN: CPT | Performed by: INTERNAL MEDICINE

## 2019-11-11 RX ORDER — HYDROCHLOROTHIAZIDE 12.5 MG/1
12.5 TABLET ORAL DAILY
Qty: 90 TABLET | Refills: 3 | Status: SHIPPED | OUTPATIENT
Start: 2019-11-11

## 2019-11-11 NOTE — PATIENT INSTRUCTIONS
1) Avoid NSAIDS - (Example - motrin, advil, ibuprofen, aleve, exederin, etc)  2) Always follow a low salt diet  3) labwork in 1-2 weeks  4) ultrasound prior to next appt   5) appointment in 3-4 months

## 2019-11-11 NOTE — LETTER
November 11, 2019     Eden [address] [address]    Patient: Fabian Soliz   YOB: 2000   Date of Visit: 11/11/2019       Dear Dr Estefani Espino: Thank you for referring Rico Carroll to me for evaluation  Below are my notes for this consultation  If you have questions, please do not hesitate to call me  I look forward to following your patient along with you  Sincerely,        Morris Hilario MD        CC: No Recipients  Morris Hilario MD  11/11/2019 10:35 AM  Sign at close encounter  [address] 22 y o  female MRN: [id number]  11/11/2019    Reason for Visit: Darnell's syndrome    ASSESSMENT and PLAN:    I had the pleasure of seeing Ms Harvey Mccormack today in the renal clinic for the continued management of Darnell's syndrome  25-year-old female who is transitioning care from Pediatric Nephrology, with a PMHx of pseudohypoaldosteronism type II, migraine headaches, social ETOH, non smoker, no drugs, recent pregnancy starting July 2018, and blood pressures was currently continue to decrease  Hydrochlorothiazide was down titrated completely held November 2018  Patient was seen in the ER for noticing blood in her urine all pregnant in November 2018  Underwent a renal ultrasound which showed moderate right-sided hydronephrosis, bilateral ureteral jets, no renal stones  Then at the end of November 2018, patient was seen in the ER due to elevated blood pressures due to concern for preeclampsia  Did not appear that the patient was preeclamptic the patient was discharged in stable condition  Patient was then admitted to the hospital in February 2019 for induction and baby was delivered on 2/6/2019  Patient was restarted on hydrochlorothiazide during the pregnancy      1) Pseudohypoaldosteronism Type II    - first diagnosed at 4 or 13 yo  - mother also with same disease  - was on HCTZ 25 mg prior to pregnancy  - then held during pregnancy and restarted  - BP controlled in office    Plan:  - cont HCTZ 12 5 mg daily  - recheck labs this week  - ultrasound to f/u R sided prior hydro    2) R sided hydro    - secondary to stone vs pregnancy  - repeat u/s as above    3) migraines    - has migraines 1-2 times per month  - takes motrin if needed; but sleep helps    4) currently in school - for criminal justice    5) HTN - controlled  See above    It was a pleasure evaluating your patient in the office today  Thank you for allowing our team to participate in the care of Ms Merlene Newell  Please do not hesitate to contact our team if further issues/questions shall arise in the interim  No problem-specific Assessment & Plan notes found for this encounter  HPI:    Patient denies complaints  No recent fevers, chills, nausea, vomiting, diarrhea  PATIENT INSTRUCTIONS:    There are no Patient Instructions on file for this visit  OBJECTIVE:  Current Weight: Weight - Scale: 109 kg (241 lb)  Vitals:    11/11/19 0955   Weight: 109 kg (241 lb)    Body mass index is 44 08 kg/m²  REVIEW OF SYSTEMS:    Review of Systems   Constitutional: Negative  Negative for fatigue  HENT: Negative  Eyes: Negative  Respiratory: Negative  Negative for shortness of breath  Cardiovascular: Negative  Negative for leg swelling  Gastrointestinal: Negative  Endocrine: Negative  Genitourinary: Negative  Negative for difficulty urinating  Musculoskeletal: Negative  Skin: Negative  Allergic/Immunologic: Negative  Neurological: Negative  Hematological: Negative  Psychiatric/Behavioral: Negative  All other systems reviewed and are negative  PHYSICAL EXAM:      Physical Exam   Constitutional: She is oriented to person, place, and time  She appears well-developed and well-nourished  No distress  HENT:   Head: Normocephalic and atraumatic     Mouth/Throat: No oropharyngeal exudate  Eyes: Conjunctivae are normal  Right eye exhibits no discharge  Left eye exhibits no discharge  No scleral icterus  Neck: Normal range of motion  Neck supple  No JVD present  Cardiovascular: Normal rate and regular rhythm  Exam reveals no gallop and no friction rub  No murmur heard  Pulmonary/Chest: Effort normal and breath sounds normal  No respiratory distress  She has no wheezes  She has no rales  Abdominal: Soft  Bowel sounds are normal  She exhibits no distension  There is no tenderness  There is no rebound  Musculoskeletal: Normal range of motion  She exhibits no edema, tenderness or deformity  Neurological: She is alert and oriented to person, place, and time  Coordination normal    Skin: Skin is warm and dry  No rash noted  She is not diaphoretic  No erythema  No pallor  Psychiatric: She has a normal mood and affect  Her behavior is normal  Judgment and thought content normal    Nursing note and vitals reviewed        Medications:    Current Outpatient Medications:     albuterol (PROVENTIL HFA,VENTOLIN HFA) 90 mcg/act inhaler, INHALE 1-2 PUFFS BY MOUTH 4 TIMES A DAY, Disp: , Rfl: 12    aspirin (ECOTRIN LOW STRENGTH) 81 mg EC tablet, Take 81 mg by mouth 2 (two) times a day  , Disp: , Rfl:     Blood Pressure Monitoring (BLOOD PRESSURE KIT) POLLY, 1 kit by Does not apply route as needed (for blood pressure checks) (Patient not taking: Reported on 9/23/2019), Disp: 1 Device, Rfl: 0    docusate sodium (COLACE) 100 mg capsule, Take 1 capsule (100 mg total) by mouth 2 (two) times a day (Patient not taking: Reported on 2/27/2019), Disp: 60 capsule, Rfl: 2    Ferrous Fumarate-Vitamin C (CRISTIAN-SEQUELS PO), Take 1 caplet by mouth daily, Disp: , Rfl:     hydrochlorothiazide (HYDRODIURIL) 12 5 mg tablet, Take 1 tablet (12 5 mg total) by mouth daily, Disp: 90 tablet, Rfl: 3    hydrocortisone 1 % cream, Apply 1 application topically as needed for rash (after each bowel movement for skin irritation ) (Patient not taking: Reported on 2/27/2019), Disp: 30 g, Rfl: 0    ibuprofen (MOTRIN) 600 mg tablet, Take 1 tablet (600 mg total) by mouth every 6 (six) hours as needed for mild pain (Patient not taking: Reported on 7/15/2019), Disp: 30 tablet, Rfl: 1    levonorgestrel (KYLEENA) 19 5 MG intrauterine device, 1 Intra Uterine Device by Intrauterine route once, Disp: , Rfl:     ondansetron (ZOFRAN-ODT) 8 mg disintegrating tablet, as needed, Disp: , Rfl: 1    Prenatal MV-Min-Fe Fum-FA-DHA (PRENATAL+DHA PO), Take 1 tablet by mouth daily, Disp: , Rfl:     Laboratory Results:        Invalid input(s): ALBUMIN    Results for orders placed or performed in visit on 09/23/19   Liquid-based pap, screening   Result Value Ref Range    Case Report       Gynecologic Cytology Report                       Case: [id number]                                  Authorizing Provider:  Elizabeth Walters MD         Collected:           09/23/2019 0953              Ordering Location:     [facility]      Received:            09/23/2019 0953              First Screen:          Misty Hernandes CT                                                         Specimen:    LIQUID-BASED PAP, SCREENING, Cervix                                                        Primary Interpretation Negative for intraepithelial lesion or malignancy     Specimen Adequacy       Satisfactory for evaluation  Endocervical/transformation zone component present  Additional Information       [employer] FDA approved ,  and ThinPrep Imaging Duo System are utilized with strict adherence to the 's instruction manual to prepare gynecologic and non-gynecologic cytology specimens for the production of ThinPrep slides as well as for gynecologic ThinPrep imaging  These processes have been validated by our laboratory and/or by the     The Pap test is not a diagnostic procedure and should not be used as the sole means to detect cervical cancer  It is only a screening procedure to aid in the detection of cervical cancer and its precursors  Both false-negative and false-positive results have been experienced  Your patient's test result should be interpreted in this context together with the history and clinical findings        LMP 9/14/2019

## 2019-11-11 NOTE — PROGRESS NOTES
NEPHROLOGY OFFICE VISIT   Deion He 22 y o  female MRN: [id number]  11/11/2019    Reason for Visit: Darnell's syndrome    ASSESSMENT and PLAN:    I had the pleasure of seeing Ms Amie Enriquez today in the renal clinic for the continued management of Darnell's syndrome  25-year-old female who is transitioning care from Pediatric Nephrology, with a PMHx of pseudohypoaldosteronism type II, migraine headaches, social ETOH, non smoker, no drugs, recent pregnancy starting July 2018, and blood pressures was currently continue to decrease  Hydrochlorothiazide was down titrated completely held November 2018  Patient was seen in the ER for noticing blood in her urine all pregnant in November 2018  Underwent a renal ultrasound which showed moderate right-sided hydronephrosis, bilateral ureteral jets, no renal stones  Then at the end of November 2018, patient was seen in the ER due to elevated blood pressures due to concern for preeclampsia  Did not appear that the patient was preeclamptic the patient was discharged in stable condition  Patient was then admitted to the hospital in February 2019 for induction and baby was delivered on 2/6/2019  Patient was restarted on hydrochlorothiazide during the pregnancy  1) Pseudohypoaldosteronism Type II    - first diagnosed at 4 or 13 yo  - mother also with same disease  - was on HCTZ 25 mg prior to pregnancy  - then held during pregnancy and restarted  - BP controlled in office    Plan:  - cont HCTZ 12 5 mg daily  - recheck labs this week  - ultrasound to f/u R sided prior hydro    2) R sided hydro    - secondary to stone vs pregnancy  - repeat u/s as above    3) migraines    - has migraines 1-2 times per month  - takes motrin if needed; but sleep helps    4) currently in school - for criminal justice    5) HTN - controlled  See above    6) anemia    - check CBC    It was a pleasure evaluating your patient in the office today   Thank you for allowing our team to participate in the care of Ms Deion He  Please do not hesitate to contact our team if further issues/questions shall arise in the interim  No problem-specific Assessment & Plan notes found for this encounter  HPI:    Patient denies complaints  No recent fevers, chills, nausea, vomiting, diarrhea  PATIENT INSTRUCTIONS:    There are no Patient Instructions on file for this visit  OBJECTIVE:  Current Weight: Weight - Scale: 109 kg (241 lb)  Vitals:    11/11/19 0955   Weight: 109 kg (241 lb)    Body mass index is 44 08 kg/m²  REVIEW OF SYSTEMS:    Review of Systems   Constitutional: Negative  Negative for fatigue  HENT: Negative  Eyes: Negative  Respiratory: Negative  Negative for shortness of breath  Cardiovascular: Negative  Negative for leg swelling  Gastrointestinal: Negative  Endocrine: Negative  Genitourinary: Negative  Negative for difficulty urinating  Musculoskeletal: Negative  Skin: Negative  Allergic/Immunologic: Negative  Neurological: Negative  Hematological: Negative  Psychiatric/Behavioral: Negative  All other systems reviewed and are negative  PHYSICAL EXAM:      Physical Exam   Constitutional: She is oriented to person, place, and time  She appears well-developed and well-nourished  No distress  HENT:   Head: Normocephalic and atraumatic  Mouth/Throat: No oropharyngeal exudate  Eyes: Conjunctivae are normal  Right eye exhibits no discharge  Left eye exhibits no discharge  No scleral icterus  Neck: Normal range of motion  Neck supple  No JVD present  Cardiovascular: Normal rate and regular rhythm  Exam reveals no gallop and no friction rub  No murmur heard  Pulmonary/Chest: Effort normal and breath sounds normal  No respiratory distress  She has no wheezes  She has no rales  Abdominal: Soft  Bowel sounds are normal  She exhibits no distension  There is no tenderness  There is no rebound     Musculoskeletal: Normal range of motion  She exhibits no edema, tenderness or deformity  Neurological: She is alert and oriented to person, place, and time  Coordination normal    Skin: Skin is warm and dry  No rash noted  She is not diaphoretic  No erythema  No pallor  Psychiatric: She has a normal mood and affect  Her behavior is normal  Judgment and thought content normal    Nursing note and vitals reviewed        Medications:    Current Outpatient Medications:     albuterol (PROVENTIL HFA,VENTOLIN HFA) 90 mcg/act inhaler, INHALE 1-2 PUFFS BY MOUTH 4 TIMES A DAY, Disp: , Rfl: 12    aspirin (ECOTRIN LOW STRENGTH) 81 mg EC tablet, Take 81 mg by mouth 2 (two) times a day  , Disp: , Rfl:     Blood Pressure Monitoring (BLOOD PRESSURE KIT) POLLY, 1 kit by Does not apply route as needed (for blood pressure checks) (Patient not taking: Reported on 9/23/2019), Disp: 1 Device, Rfl: 0    docusate sodium (COLACE) 100 mg capsule, Take 1 capsule (100 mg total) by mouth 2 (two) times a day (Patient not taking: Reported on 2/27/2019), Disp: 60 capsule, Rfl: 2    Ferrous Fumarate-Vitamin C (CRISTIAN-SEQUELS PO), Take 1 caplet by mouth daily, Disp: , Rfl:     hydrochlorothiazide (HYDRODIURIL) 12 5 mg tablet, Take 1 tablet (12 5 mg total) by mouth daily, Disp: 90 tablet, Rfl: 3    hydrocortisone 1 % cream, Apply 1 application topically as needed for rash (after each bowel movement for skin irritation ) (Patient not taking: Reported on 2/27/2019), Disp: 30 g, Rfl: 0    ibuprofen (MOTRIN) 600 mg tablet, Take 1 tablet (600 mg total) by mouth every 6 (six) hours as needed for mild pain (Patient not taking: Reported on 7/15/2019), Disp: 30 tablet, Rfl: 1    levonorgestrel (KYLEENA) 19 5 MG intrauterine device, 1 Intra Uterine Device by Intrauterine route once, Disp: , Rfl:     ondansetron (ZOFRAN-ODT) 8 mg disintegrating tablet, as needed, Disp: , Rfl: 1    Prenatal MV-Min-Fe Fum-FA-DHA (PRENATAL+DHA PO), Take 1 tablet by mouth daily, Disp: , Rfl:     Laboratory Results:        Invalid input(s): ALBUMIN    Results for orders placed or performed in visit on 09/23/19   Liquid-based pap, screening   Result Value Ref Range    Case Report       Gynecologic Cytology Report                       Case: [id number]                                  Authorizing Provider:  Maranda Recinos MD         Collected:           09/23/2019 0953              Ordering Location:     [facility]      Received:            09/23/2019 5147              First Screen:          [employer], [place]                                                         Specimen:    LIQUID-BASED PAP, SCREENING, Cervix                                                        Primary Interpretation Negative for intraepithelial lesion or malignancy     Specimen Adequacy       Satisfactory for evaluation  Endocervical/transformation zone component present  Additional Information       [employer] FDA approved ,  and ThinPrep Imaging Duo System are utilized with strict adherence to the 's instruction manual to prepare gynecologic and non-gynecologic cytology specimens for the production of ThinPrep slides as well as for gynecologic ThinPrep imaging  These processes have been validated by our laboratory and/or by the   The Pap test is not a diagnostic procedure and should not be used as the sole means to detect cervical cancer  It is only a screening procedure to aid in the detection of cervical cancer and its precursors  Both false-negative and false-positive results have been experienced  Your patient's test result should be interpreted in this context together with the history and clinical findings        LMP 9/14/2019

## 2020-02-25 ENCOUNTER — LAB (OUTPATIENT)
Dept: LAB | Age: 20
End: 2020-02-25
Payer: COMMERCIAL

## 2020-02-25 DIAGNOSIS — N25.89 PSEUDOHYPOALDOSTERONISM, TYPE 2: ICD-10-CM

## 2020-02-25 DIAGNOSIS — D64.9 ANEMIA, UNSPECIFIED TYPE: ICD-10-CM

## 2020-02-25 LAB
ANION GAP SERPL CALCULATED.3IONS-SCNC: 6 MMOL/L (ref 4–13)
BUN SERPL-MCNC: 12 MG/DL (ref 5–25)
CALCIUM SERPL-MCNC: 9 MG/DL (ref 8.3–10.1)
CHLORIDE SERPL-SCNC: 113 MMOL/L (ref 100–108)
CO2 SERPL-SCNC: 22 MMOL/L (ref 21–32)
CREAT SERPL-MCNC: 0.74 MG/DL (ref 0.6–1.3)
ERYTHROCYTE [DISTWIDTH] IN BLOOD BY AUTOMATED COUNT: 15.2 % (ref 11.6–15.1)
GFR SERPL CREATININE-BSD FRML MDRD: 116 ML/MIN/1.73SQ M
GLUCOSE SERPL-MCNC: 85 MG/DL (ref 65–140)
HCT VFR BLD AUTO: 42.8 % (ref 34.8–46.1)
HGB BLD-MCNC: 12.4 G/DL (ref 11.5–15.4)
MAGNESIUM SERPL-MCNC: 2.3 MG/DL (ref 1.6–2.6)
MCH RBC QN AUTO: 24.8 PG (ref 26.8–34.3)
MCHC RBC AUTO-ENTMCNC: 29 G/DL (ref 31.4–37.4)
MCV RBC AUTO: 86 FL (ref 82–98)
PHOSPHATE SERPL-MCNC: 3.5 MG/DL (ref 2.7–4.5)
PLATELET # BLD AUTO: 228 THOUSANDS/UL (ref 149–390)
PMV BLD AUTO: 10.6 FL (ref 8.9–12.7)
POTASSIUM SERPL-SCNC: 5 MMOL/L (ref 3.5–5.3)
RBC # BLD AUTO: 5 MILLION/UL (ref 3.81–5.12)
SODIUM SERPL-SCNC: 141 MMOL/L (ref 136–145)
WBC # BLD AUTO: 5.97 THOUSAND/UL (ref 4.31–10.16)

## 2020-02-25 PROCEDURE — 36415 COLL VENOUS BLD VENIPUNCTURE: CPT

## 2020-02-25 PROCEDURE — 83735 ASSAY OF MAGNESIUM: CPT

## 2020-02-25 PROCEDURE — 85027 COMPLETE CBC AUTOMATED: CPT

## 2020-02-25 PROCEDURE — 84100 ASSAY OF PHOSPHORUS: CPT

## 2020-02-25 PROCEDURE — 80048 BASIC METABOLIC PNL TOTAL CA: CPT

## 2020-02-26 ENCOUNTER — TELEPHONE (OUTPATIENT)
Dept: NEPHROLOGY | Facility: CLINIC | Age: 20
End: 2020-02-26

## 2020-02-26 NOTE — TELEPHONE ENCOUNTER
Pt advised that labs are stable  [facility]     ----- Message from Bethann Hammans, MD sent at 2/26/2020  3:58 PM EST -----  Hello    Patient normally is followed up by Ms Veda Lewis  Can you please let the patient know that the renal function is stable  Potassium level stable   -is the patient on call back list for next month or the month after?   For appointment    Thank you    np

## 2020-02-26 NOTE — RESULT ENCOUNTER NOTE
Hello    Patient normally is followed up by Ms Darius Ames  Can you please let the patient know that the renal function is stable  Potassium level stable   -is the patient on call back list for next month or the month after?   For appointment    Thank you    np

## 2020-03-18 ENCOUNTER — TELEPHONE (OUTPATIENT)
Dept: NEPHROLOGY | Facility: CLINIC | Age: 20
End: 2020-03-18

## 2020-03-18 NOTE — TELEPHONE ENCOUNTER
I left a message for patient asking her to call back  We are seeing if she would be ok doing a tele-med call with Dr Samson Ramirez

## 2020-11-03 ENCOUNTER — ANNUAL EXAM (OUTPATIENT)
Dept: OBGYN CLINIC | Facility: CLINIC | Age: 20
End: 2020-11-03
Payer: COMMERCIAL

## 2020-11-03 ENCOUNTER — OFFICE VISIT (OUTPATIENT)
Dept: FAMILY MEDICINE CLINIC | Facility: CLINIC | Age: 20
End: 2020-11-03
Payer: COMMERCIAL

## 2020-11-03 VITALS
WEIGHT: 247.2 LBS | OXYGEN SATURATION: 98 % | SYSTOLIC BLOOD PRESSURE: 122 MMHG | BODY MASS INDEX: 43.8 KG/M2 | TEMPERATURE: 97.5 F | HEIGHT: 63 IN | RESPIRATION RATE: 16 BRPM | DIASTOLIC BLOOD PRESSURE: 86 MMHG | HEART RATE: 88 BPM

## 2020-11-03 VITALS
WEIGHT: 249.2 LBS | SYSTOLIC BLOOD PRESSURE: 110 MMHG | TEMPERATURE: 98.3 F | HEIGHT: 63 IN | BODY MASS INDEX: 44.16 KG/M2 | DIASTOLIC BLOOD PRESSURE: 80 MMHG

## 2020-11-03 DIAGNOSIS — G43.909 MIGRAINE WITHOUT STATUS MIGRAINOSUS, NOT INTRACTABLE, UNSPECIFIED MIGRAINE TYPE: ICD-10-CM

## 2020-11-03 DIAGNOSIS — Z23 NEED FOR VACCINATION: ICD-10-CM

## 2020-11-03 DIAGNOSIS — Z01.419 WOMEN'S ANNUAL ROUTINE GYNECOLOGICAL EXAMINATION: Primary | ICD-10-CM

## 2020-11-03 DIAGNOSIS — Z00.00 ANNUAL PHYSICAL EXAM: Primary | ICD-10-CM

## 2020-11-03 DIAGNOSIS — M25.50 PAIN IN JOINT, MULTIPLE SITES: ICD-10-CM

## 2020-11-03 DIAGNOSIS — F32.1 DEPRESSION, MAJOR, SINGLE EPISODE, MODERATE (HCC): ICD-10-CM

## 2020-11-03 DIAGNOSIS — N25.89 PSEUDOHYPOALDOSTERONISM, TYPE 2: ICD-10-CM

## 2020-11-03 DIAGNOSIS — Z11.3 SCREENING FOR STDS (SEXUALLY TRANSMITTED DISEASES): ICD-10-CM

## 2020-11-03 PROCEDURE — 90682 RIV4 VACC RECOMBINANT DNA IM: CPT | Performed by: FAMILY MEDICINE

## 2020-11-03 PROCEDURE — 99385 PREV VISIT NEW AGE 18-39: CPT | Performed by: FAMILY MEDICINE

## 2020-11-03 PROCEDURE — G0145 SCR C/V CYTO,THINLAYER,RESCR: HCPCS | Performed by: OBSTETRICS & GYNECOLOGY

## 2020-11-03 PROCEDURE — 90471 IMMUNIZATION ADMIN: CPT | Performed by: FAMILY MEDICINE

## 2020-11-03 PROCEDURE — 99203 OFFICE O/P NEW LOW 30 MIN: CPT | Performed by: FAMILY MEDICINE

## 2020-11-03 PROCEDURE — 99395 PREV VISIT EST AGE 18-39: CPT | Performed by: OBSTETRICS & GYNECOLOGY

## 2020-11-03 PROCEDURE — 87591 N.GONORRHOEAE DNA AMP PROB: CPT | Performed by: OBSTETRICS & GYNECOLOGY

## 2020-11-03 PROCEDURE — 87491 CHLMYD TRACH DNA AMP PROBE: CPT | Performed by: OBSTETRICS & GYNECOLOGY

## 2020-11-03 RX ORDER — SUMATRIPTAN 25 MG/1
25 TABLET, FILM COATED ORAL ONCE AS NEEDED
Qty: 10 TABLET | Refills: 1 | Status: SHIPPED | OUTPATIENT
Start: 2020-11-03 | End: 2021-01-19 | Stop reason: SDUPTHER

## 2020-11-06 LAB
C TRACH DNA SPEC QL NAA+PROBE: NEGATIVE
N GONORRHOEA DNA SPEC QL NAA+PROBE: NEGATIVE

## 2020-11-09 LAB
LAB AP GYN PRIMARY INTERPRETATION: NORMAL
LAB AP LMP: NORMAL
Lab: NORMAL

## 2021-01-08 ENCOUNTER — TELEPHONE (OUTPATIENT)
Dept: FAMILY MEDICINE CLINIC | Facility: CLINIC | Age: 21
End: 2021-01-08

## 2021-01-08 ENCOUNTER — TELEMEDICINE (OUTPATIENT)
Dept: FAMILY MEDICINE CLINIC | Facility: CLINIC | Age: 21
End: 2021-01-08
Payer: COMMERCIAL

## 2021-01-08 VITALS
WEIGHT: 245 LBS | SYSTOLIC BLOOD PRESSURE: 131 MMHG | HEIGHT: 63 IN | HEART RATE: 95 BPM | DIASTOLIC BLOOD PRESSURE: 90 MMHG | TEMPERATURE: 98.3 F | BODY MASS INDEX: 43.41 KG/M2

## 2021-01-08 DIAGNOSIS — B34.9 VIRAL INFECTION, UNSPECIFIED: Primary | ICD-10-CM

## 2021-01-08 DIAGNOSIS — B34.9 VIRAL INFECTION, UNSPECIFIED: ICD-10-CM

## 2021-01-08 PROCEDURE — 99213 OFFICE O/P EST LOW 20 MIN: CPT | Performed by: FAMILY MEDICINE

## 2021-01-08 PROCEDURE — U0003 INFECTIOUS AGENT DETECTION BY NUCLEIC ACID (DNA OR RNA); SEVERE ACUTE RESPIRATORY SYNDROME CORONAVIRUS 2 (SARS-COV-2) (CORONAVIRUS DISEASE [COVID-19]), AMPLIFIED PROBE TECHNIQUE, MAKING USE OF HIGH THROUGHPUT TECHNOLOGIES AS DESCRIBED BY CMS-2020-01-R: HCPCS | Performed by: FAMILY MEDICINE

## 2021-01-08 PROCEDURE — U0005 INFEC AGEN DETEC AMPLI PROBE: HCPCS | Performed by: FAMILY MEDICINE

## 2021-01-08 NOTE — PATIENT INSTRUCTIONS
[address]    Your healthcare provider and/or public health staff have evaluated you and have determined that you do not need to remain in the hospital at this time  At this time you can be isolated at home where you will be monitored by staff from your local or state health department  You should carefully follow the prevention and isolation steps below until a healthcare provider or local or state health department says that you can return to your normal activities  Stay home except to get medical care    People who are mildly ill with COVID-19 are able to isolate at home during their illness  You should restrict activities outside your home, except for getting medical care  Do not go to work, school, or public areas  Avoid using public transportation, ride-sharing, or taxis  Separate yourself from other people and animals in your home    People: As much as possible, you should stay in a specific room and away from other people in your home  Also, you should use a separate bathroom, if available  Animals: You should restrict contact with pets and other animals while you are sick with COVID-19, just like you would around other people  Although there have not been reports of pets or other animals becoming sick with COVID-19, it is still recommended that people sick with COVID-19 limit contact with animals until more information is known about the virus  When possible, have another member of your household care for your animals while you are sick  If you are sick with COVID-19, avoid contact with your pet, including petting, snuggling, being kissed or licked, and sharing food  If you must care for your pet or be around animals while you are sick, wash your hands before and after you interact with pets and wear a facemask  See COVID-19 and Animals for more information      Call ahead before visiting your doctor    If you have a medical appointment, call the healthcare provider and tell them that you have or may have COVID-19  This will help the healthcare providers office take steps to keep other people from getting infected or exposed  Wear a facemask    You should wear a facemask when you are around other people (e g , sharing a room or vehicle) or pets and before you enter a healthcare providers office  If you are not able to wear a facemask (for example, because it causes trouble breathing), then people who live with you should not stay in the same room with you, or they should wear a facemask if they enter your room  Cover your coughs and sneezes    Cover your mouth and nose with a tissue when you cough or sneeze  Throw used tissues in a lined trash can  Immediately wash your hands with soap and water for at least 20 seconds or, if soap and water are not available, clean your hands with an alcohol-based hand  that contains at least 60% alcohol  Clean your hands often    Wash your hands often with soap and water for at least 20 seconds, especially after blowing your nose, coughing, or sneezing; going to the bathroom; and before eating or preparing food  If soap and water are not readily available, use an alcohol-based hand  with at least 60% alcohol, covering all surfaces of your hands and rubbing them together until they feel dry  Soap and water are the best option if hands are visibly dirty  Avoid touching your eyes, nose, and mouth with unwashed hands  Avoid sharing personal household items    You should not share dishes, drinking glasses, cups, eating utensils, towels, or bedding with other people or pets in your home  After using these items, they should be washed thoroughly with soap and water  Clean all high-touch surfaces everyday    High touch surfaces include counters, tabletops, doorknobs, bathroom fixtures, toilets, phones, keyboards, tablets, and bedside tables  Also, clean any surfaces that may have blood, stool, or body fluids on them   Use a household cleaning spray or wipe, according to the label instructions  Labels contain instructions for safe and effective use of the cleaning product including precautions you should take when applying the product, such as wearing gloves and making sure you have good ventilation during use of the product  Monitor your symptoms    Seek prompt medical attention if your illness is worsening (e g , difficulty breathing)  Before seeking care, call your healthcare provider and tell them that you have, or are being evaluated for, COVID-19  Put on a facemask before you enter the facility  These steps will help the healthcare providers office to keep other people in the office or waiting room from getting infected or exposed  Ask your healthcare provider to call the local or [facility] health department  Persons who are placed under active monitoring or facilitated self-monitoring should follow instructions provided by their local health department or occupational health professionals, as appropriate  If you have a medical emergency and need to call 911, notify the dispatch personnel that you have, or are being evaluated for COVID-19  If possible, put on a facemask before emergency medical services arrive      Discontinuing home isolation    Patients with confirmed COVID-19 should remain under home isolation precautions until the following conditions are met:   - They have had no fever for at least 24 hours (that is one full day of no fever without the use medicine that reduces fevers)  AND  - other symptoms have improved (for example, when their cough or shortness of breath have improved)  AND  - If had mild or moderate illness, at least 10 days have passed since their symptoms first appeared or if severe illness (needed oxygen) or immunosuppressed, at least 20 days have passed since symptoms first appeared  Patients with confirmed COVID-19 should also notify close contacts (including their workplace) and ask that they self-quarantine  Currently, close contact is defined as being within 6 feet for 15 minutes or more from the period 24 hours starting 48 hours before symptom onset to the time at which the patient went into isolation  Close contacts of patients diagnosed with COVID-19 should be instructed by the patient to self-quarantine for 14 days from the last time of their last contact with the patient  Source: [employer]    COVID TESTING TRIAGE:    Other Screening: (Travel, Work, School, etc  with no known direct exposure to [address]) - Refer to [employer] ([facility], [facility], [employer]) Call ahead to see if these locations provide these services  In an effort to best serve our community in this time of increased COVID activity, [facility] will only be providing COVID testing for those individuals who are symptomatic or have had a direct exposure to a COVID case  Unfortunately, due to resource constraints we are unable to provide testing for asymptomatic individuals who need a swab for clearance to travel, or return to work or school  Please note that [facility] Employee's in need of testing for return to work in a Network position can continue to obtain testing through this hotline  Thank you in advance for your understanding and cooperation during these challenging times  Symptomatic Patient or Exposed Patient(Close Contact with COVID + Person):  Testing Sites:   Centralized Testing [facility]Address: [address], [place]  oHours: Monday-Friday 8:00A - 5:00P  [address] (Specialty Pavilion)oAddress: [address] [facility] NEURORE[facility]AB [place], [place] [address]AHRRXI: Monday-Friday 8:00A - 5:00P  [facility]: [address] [facility] , [place], [place] [address]VWUYGA: Monday-Friday 8:00A - 5:00P   [address] [facility]oss[facility]soAddress: [address] [facility], [place] [address]QUNIVD: Monday-Friday 8:00A - 5:00P  [address]: [address] [facility], [facility], [place] [address]B Hours: Monday-Friday 8:00A - 5:00P   [employer] (OPENING 11/30)oAddress: [address], [place] [address]YDFNXH: Monday-Friday 8:00A - 7:00P & Saturday 8:00A - 1:00P   [facility] and [address] [place] [address] (OPENING 11/30)oAddress: [address], [facility]  [address](located in the parking lot behind the building)oHours: Monday-Friday 8:00A - 5:00P   [facility] Sites:  [address] Now [address] [facility]) oAddress: [address], [address]  [facility] [address], [place], [address]: Monday-Friday 7:30A - 10:30P & Sat/Sun 8:00A - 8:00P   [address]  [address] [facility] ([facility])oAddress: [address] [place] [address]kRhgwr: Monday-Friday 7:30A - 10:30P & Sat/Sun 8A - 8P  [address] Now- [place]oAddress: [address] [facility] [place] [address]dNzpmo: Monday-Friday 8:00A - 8:00P & Sat/Sun 8:00A - 4:00P  [address] ([address] Center)oAddress: [address], [address]: Monday-Friday 8:00A - 8:00P & Sat/Sun 8:00A - 4:00P  [address] Now- [place] oAddress: [address] ([address] Level) Nina Fant: Monday-Friday 8:00A - 8:00P  [address] Now-[place]soAddress: [address] [place], [place] [address]OEOTPC: Monday-Friday 8:00A-8:00P & Sat/Sun 8:00A - 4:00P  [address] Now- [place]Address: [address] [facility] [address]mBqepv: Monday-Friday 8:00A - 8:00P & Sat/Sun 8:00A - 4:00P  [address], [place], [place] [address]OVUEZM: Monday-Friday 8:00A - 8:00P  [address] Now- [place]Address: [place], [place] [address]yGpgca: 7 days a week 8:00A - 8:00P    [address]: [facility], [place], [place] [address]sIqhxc: Monday-Friday 8:00A - 8:00P     [facility]noAddress: [address] [place], [place] [address]wKoimg: Monday-Friday 8:00A - 8:00P & Sat/Sun 8:00A - 4:00P   [facility] (Outpatient Center)oAddress: [address] [place], [place] [address]FIXOXS: Monday-Friday 8:00A - 8:00P  [address] Now- [place]Address: [address] [place], [place] [address]FMDREQ: Monday-Friday 8:00A - 8:00P & Sat/Sun 8:00A - 4:00PSt  [facility] Now- [place]oAddress: [address] [place], [place] [address]RXRRIJ: 7 days a week 8:00A - 8:00P    [facility] Now- [facility] HospitalloAddress: [address], [address] [address]RSDGCJ: 7 days a week 8:00A - 8:00P  [address]: [address], [facility] [address], [place], [place] [address]CMFLJT: Monday-Friday 7:30A - 10:30P & Sat/Sun 8:00A - 8:00P    Possible side effects of bamlanivimab are: Allergic reactions   Allergic reactions can happen during and after infusion with bamlanivimab which include:    fever, chills, nausea, headache, shortness of breath, low blood pressure, wheezing, swelling of your lips, face, or  throat, rash including hives, itching, muscle aches, and dizziness  The side effects of getting any medicine by vein may include brief pain, bleeding, bruising of the skin, soreness,  swelling, and possible infection at the infusion site  These are not all the possible side effects of bamlanivimab  Not a lot of people have been given bamlanivimab  Serious and unexpected side effects may happen  Marcy Cousins is still being studied so it is possible that all of  the risks are not known at this time  Please note that this drug was approved under the Emergency Use Authorization   of the FDA and has not gone through the full, formal FDA approval process    It is possible that bamlanivimab could interfere with your body's own ability to fight off a future infection of  SARS-CoV-2   Similarly, bamlanivimab may reduce your bodys immune response to a vaccine for SARS-CoV-2  Specific studies have not been conducted to address these possible risks  Currently there is no data or safety or efficacy of COVID-19 vaccination in persons who received monoclonal antibodies   as part of COVID-19 treatment  Treatment should be deferred for at least 90 days to avoid interference of the treatment   with vaccine-induced immune responses (this is based on estimated half-life of therapies and evidence suggesting reinfection   is uncommon within 90 days of initial infection)  The patient consents to proceed with bamlanivimab infusion  Table 2: Treatment-emergent Adverse Events Reported in at Least 1% of All  Subjects in [facility]-*         Bamlinivimab  Symptom Placebo 700 mg (N=101) 2000 mg (N=107) 7000 mg (N=101) Total (N=309)   Nausea 4% 3% 4% 5% 4%   Diarrhea 5% 1% 2% 7% 3%   Dizziness 2% 3% 3% 3% 3%   Headache 2% 3% 2% 0% 2%   Pruritis 1% 2% 3% 0% 2%   Vomiting 3% 1% 3% 1% 2%     *[email or web address]-factsheet-hcp  pdf    Instructions for Bamlanivimab infusion at [facility]    1  Once you arrive at [facility], please park in the small parking lot at the [address] [facility] entrance  Do not park in the main parking lot or in the parking garage  2  When you arrive, please call the infusion nurse at [phone] to be escorted into the clinic  3  It will be approximately a 4-hour visit  4  No visitors can accompany you into the clinic  If you need a , they will need to wait in the car until treatment is over or we can call them 30 minutes before you will be ready for   5  Please bring something to occupy your time for 4 hours, i e  book, i-Pad, phone, headphones, etc   6  Please make sure you bring your mask  You must wear a mask for the duration of your treatment  7  The clinic will have light refreshments and snacks available

## 2021-01-08 NOTE — TELEPHONE ENCOUNTER
Patient called with the following Covid-19 concern:    Patient was exposed to Covid-19? NO  Date of Exposure? Patient has the following symptoms: sore throat, congestion, runny nose,  Nausea and diarrhea   Date symptoms started: 1/7/21    Is the patient a hospital employee? NO    Seen Dr Jorge L Damon    Will route the following message as HIGH priority to a provider currently in the office for review

## 2021-01-08 NOTE — PROGRESS NOTES
COVID-19 Virtual Visit     Assessment/Plan:    Problem List Items Addressed This Visit     None      Visit Diagnoses     Viral infection, unspecified    -  Primary    Relevant Orders    Novel Coronavirus (COVID-19), PCR [facility] - Collected at [facility] or [facility]         Disposition:     I referred patient to one of our centralized sites for a COVID-19 swab  Pending labs  Patient should remain in quarantine until further notice  If her COVID-19 test is negative, she can discontinue quarantine  If her COVID-19 test is positive, she should complete a 10 day isolation through Sunday, 1/17/21, and at the earliest discontinuing isolation on Monday, 1/18/21  If COVID-19 test is negative and sore throat persists, consider Strep throat swab  Monoclonal antibody infusion info given in portal for use PRN  I have spent 25 minutes directly with the patient  Greater than 50% of this time was spent in counseling/coordination of care regarding: prognosis, risks and benefits of treatment options, instructions for management, patient and family education, importance of treatment compliance, risk factor reductions and impressions  Encounter provider Juaquin Walsh DO    Provider located at [address] [place] [id number] [phone]    Recent Visits  No visits were found meeting these conditions  Showing recent visits within past 7 days and meeting all other requirements     Today's Visits  Date Type Provider Dept   01/08/21 Telemedicine Mary Batres DO [facility]   01/08/21 Telephone [facility]   Showing today's visits and meeting all other requirements     Future Appointments  No visits were found meeting these conditions     Showing future appointments within next 150 days and meeting all other requirements      This virtual check-in was done via [employer] and patient was informed that this is a secure, HIPAA-compliant platform  She agrees to proceed  Patient agrees to participate in a virtual check in via telephone or video visit instead of presenting to the office to address urgent/immediate medical needs  Patient is aware this is a billable service  After connecting through [facility], the patient was identified by name and date of birth  Tom Hood was informed that this was a telemedicine visit and that the exam was being conducted confidentially over secure lines  My office door was closed  No one else was in the room  Tom Hood acknowledged consent and understanding of privacy and security of the telemedicine visit  I informed the patient that I have reviewed her record in Epic and presented the opportunity for her to ask any questions regarding the visit today  The patient agreed to participate  Subjective:   Tom Hood is a 23 y o  female who is concerned about COVID-19  Patient's symptoms include chills, fatigue, nasal congestion, rhinorrhea, sore throat ( Left tonsil has white exudate, erythema per mother), abdominal pain (Prior to diarrhea, now resolved), nausea, diarrhea (x 1 today, none yesterday), myalgias (Neck, shoulders) and headache (Unsure if due to chronic migraine)  Patient denies fever, anosmia, loss of taste, cough, shortness of breath, chest tightness and vomiting       Date of symptom onset: 1/7/2021    Exposure:   Contact with a person who is under investigation (PUI) for or who is positive for COVID-19 within the last 14 days?: No    Hospitalized recently for fever and/or lower respiratory symptoms?: No      Currently a healthcare worker that is involved in direct patient care?: No      Works in a special setting where the risk of COVID-19 transmission may be high? (this may include long-term care, correctional and snf facilities; homeless shelters; assisted-living facilities and group homes ): No      Resident in a special setting where the risk of COVID-19 transmission may be high? (this may include long-term care, correctional and care home facilities; homeless shelters; assisted-living facilities and group homes ): No      Not using OTC meds  Lab Results   Component Value Date    SARSCOV2 Negative 08/03/2020     Past Medical History:   Diagnosis Date    Anxiety     Asthma     childhood    Depression     Elevated glycated hemoglobin     History of UTI     3 times/yr - last 4/2018    Hypertension     chronic    Kidney stone 2018    during pregnancy    Migraine     1-2 times/month    Obesity     Pseudohypoaldosteronism, type 2     Severe headache     Vaginal delivery 2019     Past Surgical History:   Procedure Laterality Date    TOOTH EXTRACTION       Current Outpatient Medications   Medication Sig Dispense Refill    hydrochlorothiazide (HYDRODIURIL) 12 5 mg tablet Take 1 tablet (12 5 mg total) by mouth daily 90 tablet 3    levonorgestrel (KYLEENA) 19 5 MG intrauterine device 1 Intra Uterine Device by Intrauterine route once      sertraline (ZOLOFT) 50 mg tablet Take half tab daily for one week, then one daily (Patient taking differently: Take 50 mg by mouth daily Take half tab daily for one week, then one daily) 30 tablet 1    SUMAtriptan (IMITREX) 25 mg tablet Take 1 tablet (25 mg total) by mouth once as needed for migraine May repeat x 1 in one hour  10 tablet 1     No current facility-administered medications for this visit  Allergies   Allergen Reactions    Nitrofurantoin Hives     Oct 2017    Pollen Extract Allergic Rhinitis       Review of Systems   Constitutional: Positive for chills and fatigue  Negative for fever  HENT: Positive for congestion, rhinorrhea and sore throat ( Left tonsil has white exudate, erythema per mother)  Respiratory: Negative for cough, chest tightness and shortness of breath      Gastrointestinal: Positive for abdominal pain (Prior to diarrhea, now resolved), diarrhea (x 1 today, none yesterday) and nausea  Negative for vomiting  Musculoskeletal: Positive for myalgias (Neck, shoulders)  Neurological: Positive for headaches (Unsure if due to chronic migraine)  Objective:    Vitals:    01/08/21 1104 01/08/21 1158   BP: 137/94 131/90   Pulse: 95    Temp: 98 3 °F (36 8 °C)    TempSrc: Oral    Weight: 111 kg (245 lb)    Height: 5' 3 25" (1 607 m)        Physical Exam  Vitals signs and nursing note reviewed  Constitutional:       General: She is not in acute distress  Appearance: Normal appearance  She is well-developed  She is obese  HENT:      Head: Normocephalic and atraumatic  Right Ear: External ear normal       Left Ear: External ear normal       Nose:      Right Sinus: Maxillary sinus tenderness and frontal sinus tenderness present  Left Sinus: Maxillary sinus tenderness present  No frontal sinus tenderness  Mouth/Throat:      Mouth: Mucous membranes are moist       Pharynx: Oropharynx is clear  Posterior oropharyngeal erythema present  No oropharyngeal exudate  Eyes:      Extraocular Movements: Extraocular movements intact  Conjunctiva/sclera: Conjunctivae normal    Neck:      Musculoskeletal: Normal range of motion  Cardiovascular:      Rate and Rhythm: Normal rate  Pulmonary:      Effort: Pulmonary effort is normal  No respiratory distress  Abdominal:      Palpations: Abdomen is soft  Tenderness: There is abdominal tenderness (Mild) in the left lower quadrant  There is no guarding or rebound  Musculoskeletal:         General: No swelling  Right lower leg: No edema  Left lower leg: Edema present  Skin:     General: Skin is warm and dry  Neurological:      General: No focal deficit present  Mental Status: She is alert  Psychiatric:         Mood and Affect: Mood normal        VIRTUAL VISIT DISCLAIMER    Deangeloanupam Hull acknowledges that she has consented to an online visit or consultation   She understands that the online visit is based solely on information provided by her, and that, in the absence of a face-to-face physical evaluation by the physician, the diagnosis she receives is both limited and provisional in terms of accuracy and completeness  This is not intended to replace a full medical face-to-face evaluation by the physician  Guero Morales understands and accepts these terms

## 2021-01-09 LAB — SARS-COV-2 RNA SPEC QL NAA+PROBE: NOT DETECTED

## 2021-01-10 ENCOUNTER — OFFICE VISIT (OUTPATIENT)
Dept: URGENT CARE | Age: 21
End: 2021-01-10
Payer: COMMERCIAL

## 2021-01-10 VITALS
RESPIRATION RATE: 16 BRPM | HEART RATE: 110 BPM | OXYGEN SATURATION: 99 % | HEIGHT: 63 IN | TEMPERATURE: 97.1 F | WEIGHT: 245 LBS | BODY MASS INDEX: 43.41 KG/M2

## 2021-01-10 DIAGNOSIS — J02.0 STREP PHARYNGITIS: Primary | ICD-10-CM

## 2021-01-10 DIAGNOSIS — R09.81 NASAL CONGESTION: ICD-10-CM

## 2021-01-10 PROCEDURE — G0382 LEV 3 HOSP TYPE B ED VISIT: HCPCS | Performed by: PHYSICIAN ASSISTANT

## 2021-01-10 RX ORDER — AMOXICILLIN 500 MG/1
500 CAPSULE ORAL EVERY 8 HOURS SCHEDULED
Qty: 30 CAPSULE | Refills: 0 | Status: SHIPPED | OUTPATIENT
Start: 2021-01-10 | End: 2021-01-20

## 2021-01-10 NOTE — PROGRESS NOTES
[facility]        NAME: Laura Flynn is a 23 y o  female  : 2000    MRN: [id number]  DATE: January 10, 2021  TIME: 10:31 AM    Assessment and Plan   Strep pharyngitis [J02 0]  1  Strep pharyngitis  amoxicillin (AMOXIL) 500 mg capsule    al mag oxide-diphenhydramine-lidocaine viscous (MAGIC MOUTHWASH) 1:1:1 suspension   2  Nasal congestion           Patient Instructions       Follow up with PCP in 3-5 days  Proceed to  ER if symptoms worsen  Chief Complaint     Chief Complaint   Patient presents with    Sore Throat     started thursday morning    Nasal Congestion     negative on friday         History of Present Illness       Patient for evaluation of a sore throat which is persisting since Thursday  Patient did have little bit congestion and was tested for COVID and was negative  She denies any other symptoms  Review of Systems   Review of Systems   Constitutional: Negative  HENT: Positive for congestion and sore throat  Negative for ear discharge, ear pain, postnasal drip, rhinorrhea, sinus pressure, sinus pain and trouble swallowing  Eyes: Negative  Respiratory: Negative  Cardiovascular: Negative  Gastrointestinal: Negative  Neurological: Negative            Current Medications       Current Outpatient Medications:     hydrochlorothiazide (HYDRODIURIL) 12 5 mg tablet, Take 1 tablet (12 5 mg total) by mouth daily, Disp: 90 tablet, Rfl: 3    levonorgestrel (KYLEENA) 19 5 MG intrauterine device, 1 Intra Uterine Device by Intrauterine route once, Disp: , Rfl:     sertraline (ZOLOFT) 50 mg tablet, Take half tab daily for one week, then one daily (Patient taking differently: Take 50 mg by mouth daily Take half tab daily for one week, then one daily), Disp: 30 tablet, Rfl: 1    SUMAtriptan (IMITREX) 25 mg tablet, Take 1 tablet (25 mg total) by mouth once as needed for migraine May repeat x 1 in one hour , Disp: 10 tablet, Rfl: 1    [employer] oxide-diphenhydramine-lidocaine viscous (MAGIC MOUTHWASH) 1:1:1 suspension, Swish and spit 10 mL every 4 (four) hours as needed (sore throat), Disp: 90 mL, Rfl: 0    amoxicillin (AMOXIL) 500 mg capsule, Take 1 capsule (500 mg total) by mouth every 8 (eight) hours for 10 days, Disp: 30 capsule, Rfl: 0    Current Allergies     Allergies as of 01/10/2021 - Reviewed 01/10/2021   Allergen Reaction Noted    Nitrofurantoin Hives 10/16/2017    Pollen extract Allergic Rhinitis 12/21/2016            The following portions of the patient's history were reviewed and updated as appropriate: allergies, current medications, past family history, past medical history, past social history, past surgical history and problem list      Past Medical History:   Diagnosis Date    Anxiety     Asthma     childhood    Depression     Elevated glycated hemoglobin     History of UTI     3 times/yr - last 4/2018    Hypertension     chronic    Kidney stone 2018    during pregnancy    Migraine     1-2 times/month    Obesity     Pseudohypoaldosteronism, type 2     Severe headache     Vaginal delivery 2019       Past Surgical History:   Procedure Laterality Date    TOOTH EXTRACTION         Family History   Problem Relation Age of Onset    Hypertension Mother     Nephrolithiasis Mother     Anxiety disorder Mother     Other Mother         Pseudohypoaldosteronism, type 2    Hypertension Father     Hyperlipidemia Father     Hypertension Maternal Grandfather     Heart disease Maternal Grandfather     Lupus Maternal Grandfather     Hypertension Paternal Grandmother     Hypertension Paternal Grandfather     Heart disease Paternal Grandfather     Diabetes Maternal Uncle     Arthritis Maternal Grandmother     Depression Maternal Grandmother     Anxiety disorder Maternal Grandmother     Diabetes type II Maternal Grandmother     Other Brother         Pseudohypoaldosteronism, type 2    No Known Problems Son     Breast cancer Neg Hx         paternal cousin          Medications have been verified  Objective   Pulse (!) 110   Temp (!) 97 1 °F (36 2 °C)   Resp 16   Ht 5' 3" (1 6 m)   Wt 111 kg (245 lb)   SpO2 99%   BMI 43 40 kg/m²   No LMP recorded  Physical Exam     Physical Exam  Vitals signs and nursing note reviewed  Constitutional:       General: She is not in acute distress  Appearance: Normal appearance  She is well-developed  She is not ill-appearing, toxic-appearing or diaphoretic  HENT:      Head: Normocephalic and atraumatic  Right Ear: Tympanic membrane and ear canal normal  No tenderness  No middle ear effusion  Tympanic membrane is not erythematous  Left Ear: Tympanic membrane and ear canal normal  No tenderness  No middle ear effusion  Tympanic membrane is not erythematous  Nose: Congestion present  No rhinorrhea  Mouth/Throat:      Mouth: Mucous membranes are moist       Pharynx: Oropharyngeal exudate and posterior oropharyngeal erythema present  No uvula swelling  Tonsils: Tonsillar exudate present  2+ on the right  2+ on the left  Eyes:      Extraocular Movements: Extraocular movements intact  Conjunctiva/sclera: Conjunctivae normal       Pupils: Pupils are equal, round, and reactive to light  Cardiovascular:      Rate and Rhythm: Normal rate and regular rhythm  Heart sounds: Normal heart sounds  Pulmonary:      Effort: Pulmonary effort is normal  No respiratory distress  Breath sounds: Normal breath sounds  No stridor  No wheezing, rhonchi or rales  Skin:     General: Skin is warm and dry  Findings: No rash  Neurological:      General: No focal deficit present  Mental Status: She is alert and oriented to person, place, and time  Psychiatric:         Mood and Affect: Mood normal          Behavior: Behavior normal          Thought Content:  Thought content normal          Judgment: Judgment normal

## 2021-01-10 NOTE — RESULT ENCOUNTER NOTE
As her COVID-19 test is negative, she can discontinue quarantine        Message sent to patient via [employer] patient portal

## 2021-01-19 ENCOUNTER — TELEMEDICINE (OUTPATIENT)
Dept: FAMILY MEDICINE CLINIC | Facility: CLINIC | Age: 21
End: 2021-01-19
Payer: COMMERCIAL

## 2021-01-19 VITALS — BODY MASS INDEX: 43.41 KG/M2 | HEIGHT: 63 IN | WEIGHT: 245 LBS

## 2021-01-19 DIAGNOSIS — G43.909 MIGRAINE WITHOUT STATUS MIGRAINOSUS, NOT INTRACTABLE, UNSPECIFIED MIGRAINE TYPE: ICD-10-CM

## 2021-01-19 DIAGNOSIS — F32.1 DEPRESSION, MAJOR, SINGLE EPISODE, MODERATE (HCC): Primary | ICD-10-CM

## 2021-01-19 PROCEDURE — 99214 OFFICE O/P EST MOD 30 MIN: CPT | Performed by: FAMILY MEDICINE

## 2021-01-19 RX ORDER — SUMATRIPTAN 25 MG/1
25 TABLET, FILM COATED ORAL ONCE AS NEEDED
Qty: 10 TABLET | Refills: 1 | Status: SHIPPED | OUTPATIENT
Start: 2021-01-19 | End: 2021-03-09 | Stop reason: SDUPTHER

## 2021-01-19 RX ORDER — SERTRALINE HYDROCHLORIDE 100 MG/1
100 TABLET, FILM COATED ORAL DAILY
Qty: 30 TABLET | Refills: 2 | Status: SHIPPED | OUTPATIENT
Start: 2021-01-19 | End: 2021-04-06 | Stop reason: SDUPTHER

## 2021-01-19 NOTE — PROGRESS NOTES
Virtual Regular Visit      Assessment/Plan:    Problem List Items Addressed This Visit        Cardiovascular and Mediastinum    Migraine without status migrainosus, not intractable     Imitrex w nsaid is working well  Still with 1-2 migraines/ week  She will try magnesium or vit B2  Follow up in March         Relevant Medications    sertraline (ZOLOFT) 100 mg tablet    SUMAtriptan (IMITREX) 25 mg tablet       Other    Depression, major, single episode, moderate ([address] ) - Primary     Improvement since starting zoloft, but PHQ9 still elevated at 14  Increase zoloft to 100 mg daily  Follow up in March, but she is aware to contact me sooner if any worsening of mood or other concerns  Refill sent  Relevant Medications    sertraline (ZOLOFT) 100 mg tablet               Reason for visit is   Chief Complaint   Patient presents with    Virtual Regular Visit     will provide VS at provider visit    Depression     2 month f/u     Medication Refill    Virtual Regular Visit        Encounter provider Barbara Keller MD    Provider located at [address] [place] [id number] [phone]      Recent Visits  No visits were found meeting these conditions  Showing recent visits within past 7 days and meeting all other requirements     Today's Visits  Date Type Provider Dept   01/19/21 Telemedicine Barbara Keller MD [facility] today's visits and meeting all other requirements     Future Appointments  No visits were found meeting these conditions  Showing future appointments within next 150 days and meeting all other requirements        The patient was identified by name and date of birth  Blue Parent was informed that this is a telemedicine visit and that the visit is being conducted through [facility] and patient was informed that this is a secure, HIPAA-compliant platform  She agrees to proceed     My office door was closed  No one else was in the room  She acknowledged consent and understanding of privacy and security of the video platform  The patient has agreed to participate and understands they can discontinue the visit at any time  Patient is aware this is a billable service  Subjective  Fabian Soliz is a 23 y o  female following up on depression  Duc Schroeder PHQ-9 Depression Screening    PHQ-9:   Frequency of the following problems over the past two weeks:      Little interest or pleasure in doing things: 1 - several days  Feeling down, depressed, or hopeless: 1 - several days  Trouble falling or staying asleep, or sleeping too much: 1 - several days  Feeling tired or having little energy: 2 - more than half the days  Poor appetite or overeatin - several days  Feeling bad about yourself - or that you are a failure or have let yourself or your family down: 3 - nearly every day  Trouble concentrating on things, such as reading the newspaper or watching television: 3 - nearly every day  Moving or speaking so slowly that other people could have noticed  Or the opposite - being so fidgety or restless that you have been moving around a lot more than usual: 2 - more than half the days  Thoughts that you would be better off dead, or of hurting yourself in some way: 0 - not at all  PHQ-2 Score: 2  PHQ-9 Score: 14       Pt notes that she feels some improvement with zoloft but still has some days that are low or she doesn't feel she has much energy  Still needs to get her labwork done that was ordered at last OV- she says she'll try to do this this week  No side effects from zoloft  She also notes that the imitrex is helping her with her migraines  Takes with ibuprofen  25 mg imitrex is typically enough  Still getting 1-2 migraines per week    HPI     Past Medical History:   Diagnosis Date    Anxiety     Asthma     childhood    Depression     Elevated glycated hemoglobin     History of UTI     3 times/yr - last 4/2018    Hypertension     chronic    Kidney stone 2018    during pregnancy    Migraine     1-2 times/month    Obesity     Pseudohypoaldosteronism, type 2     Severe headache     Vaginal delivery 2019       Past Surgical History:   Procedure Laterality Date    TOOTH EXTRACTION         Current Outpatient Medications   Medication Sig Dispense Refill    amoxicillin (AMOXIL) 500 mg capsule Take 1 capsule (500 mg total) by mouth every 8 (eight) hours for 10 days 30 capsule 0    hydrochlorothiazide (HYDRODIURIL) 12 5 mg tablet Take 1 tablet (12 5 mg total) by mouth daily 90 tablet 3    levonorgestrel (KYLEENA) 19 5 MG intrauterine device 1 Intra Uterine Device by Intrauterine route once      sertraline (ZOLOFT) 50 mg tablet Take half tab daily for one week, then one daily (Patient taking differently: Take 50 mg by mouth daily ) 30 tablet 1    SUMAtriptan (IMITREX) 25 mg tablet Take 1 tablet (25 mg total) by mouth once as needed for migraine May repeat x 1 in one hour  10 tablet 1     No current facility-administered medications for this visit  Allergies   Allergen Reactions    Nitrofurantoin Hives     Oct 2017    Pollen Extract Allergic Rhinitis       Review of Systems    Video Exam    Vitals:    01/19/21 1347   Weight: 111 kg (245 lb)   Height: 5' 3" (1 6 m)       Physical Exam  Vitals signs and nursing note reviewed  Constitutional:       Appearance: Normal appearance  She is not ill-appearing  HENT:      Head: Normocephalic and atraumatic  Neurological:      Mental Status: She is alert and oriented to person, place, and time  Psychiatric:         Mood and Affect: Mood normal          Behavior: Behavior normal           I spent 15 minutes directly with the patient during this visit      VIRTUAL VISIT [facility] acknowledges that she has consented to an online visit or consultation   She understands that the online visit is based solely on information provided by her, and that, in the absence of a face-to-face physical evaluation by the physician, the diagnosis she receives is both limited and provisional in terms of accuracy and completeness  This is not intended to replace a full medical face-to-face evaluation by the physician  Paige Kern understands and accepts these terms

## 2021-01-19 NOTE — PATIENT INSTRUCTIONS
For headache and migraine prevention I would suggest a combination vitamin supplement which may include 1 or more of the following ingredients:  Magnesium 400 mg and Riboflavin (vitamin B2) 400 - 600 mg

## 2021-01-19 NOTE — ASSESSMENT & PLAN NOTE
Imitrex w nsaid is working well  Still with 1-2 migraines/ week  She will try magnesium or vit B2    Follow up in March

## 2021-01-19 NOTE — ASSESSMENT & PLAN NOTE
Improvement since starting zoloft, but PHQ9 still elevated at 14  Increase zoloft to 100 mg daily  Follow up in March, but she is aware to contact me sooner if any worsening of mood or other concerns  Refill sent

## 2021-01-28 ENCOUNTER — LAB (OUTPATIENT)
Dept: LAB | Facility: CLINIC | Age: 21
End: 2021-01-28
Payer: COMMERCIAL

## 2021-01-28 DIAGNOSIS — M25.50 PAIN IN JOINT, MULTIPLE SITES: ICD-10-CM

## 2021-01-28 DIAGNOSIS — G43.909 MIGRAINE WITHOUT STATUS MIGRAINOSUS, NOT INTRACTABLE, UNSPECIFIED MIGRAINE TYPE: ICD-10-CM

## 2021-01-28 DIAGNOSIS — Z00.00 ANNUAL PHYSICAL EXAM: ICD-10-CM

## 2021-01-28 DIAGNOSIS — F32.1 DEPRESSION, MAJOR, SINGLE EPISODE, MODERATE (HCC): ICD-10-CM

## 2021-01-28 LAB
ALBUMIN SERPL BCP-MCNC: 3.6 G/DL (ref 3.5–5)
ALP SERPL-CCNC: 102 U/L (ref 46–116)
ALT SERPL W P-5'-P-CCNC: 20 U/L (ref 12–78)
ANION GAP SERPL CALCULATED.3IONS-SCNC: 3 MMOL/L (ref 4–13)
AST SERPL W P-5'-P-CCNC: 14 U/L (ref 5–45)
BASOPHILS # BLD AUTO: 0.05 THOUSANDS/ΜL (ref 0–0.1)
BASOPHILS NFR BLD AUTO: 1 % (ref 0–1)
BILIRUB SERPL-MCNC: 0.29 MG/DL (ref 0.2–1)
BUN SERPL-MCNC: 11 MG/DL (ref 5–25)
CALCIUM SERPL-MCNC: 8.9 MG/DL (ref 8.3–10.1)
CHLORIDE SERPL-SCNC: 112 MMOL/L (ref 100–108)
CHOLEST SERPL-MCNC: 180 MG/DL (ref 50–200)
CO2 SERPL-SCNC: 25 MMOL/L (ref 21–32)
CREAT SERPL-MCNC: 0.7 MG/DL (ref 0.6–1.3)
CRP SERPL QL: 14.6 MG/L
EOSINOPHIL # BLD AUTO: 0.1 THOUSAND/ΜL (ref 0–0.61)
EOSINOPHIL NFR BLD AUTO: 1 % (ref 0–6)
ERYTHROCYTE [DISTWIDTH] IN BLOOD BY AUTOMATED COUNT: 13.6 % (ref 11.6–15.1)
GFR SERPL CREATININE-BSD FRML MDRD: 123 ML/MIN/1.73SQ M
GLUCOSE P FAST SERPL-MCNC: 93 MG/DL (ref 65–99)
HCT VFR BLD AUTO: 42.5 % (ref 34.8–46.1)
HDLC SERPL-MCNC: 48 MG/DL
HGB BLD-MCNC: 12.8 G/DL (ref 11.5–15.4)
IMM GRANULOCYTES # BLD AUTO: 0.03 THOUSAND/UL (ref 0–0.2)
IMM GRANULOCYTES NFR BLD AUTO: 0 % (ref 0–2)
LDLC SERPL CALC-MCNC: 115 MG/DL (ref 0–100)
LYMPHOCYTES # BLD AUTO: 2.61 THOUSANDS/ΜL (ref 0.6–4.47)
LYMPHOCYTES NFR BLD AUTO: 35 % (ref 14–44)
MCH RBC QN AUTO: 26.7 PG (ref 26.8–34.3)
MCHC RBC AUTO-ENTMCNC: 30.1 G/DL (ref 31.4–37.4)
MCV RBC AUTO: 89 FL (ref 82–98)
MONOCYTES # BLD AUTO: 0.4 THOUSAND/ΜL (ref 0.17–1.22)
MONOCYTES NFR BLD AUTO: 5 % (ref 4–12)
NEUTROPHILS # BLD AUTO: 4.37 THOUSANDS/ΜL (ref 1.85–7.62)
NEUTS SEG NFR BLD AUTO: 58 % (ref 43–75)
NRBC BLD AUTO-RTO: 0 /100 WBCS
PLATELET # BLD AUTO: 248 THOUSANDS/UL (ref 149–390)
PMV BLD AUTO: 10.2 FL (ref 8.9–12.7)
POTASSIUM SERPL-SCNC: 5 MMOL/L (ref 3.5–5.3)
PROT SERPL-MCNC: 7.1 G/DL (ref 6.4–8.2)
RBC # BLD AUTO: 4.8 MILLION/UL (ref 3.81–5.12)
RHEUMATOID FACT SER QL LA: NEGATIVE
SODIUM SERPL-SCNC: 140 MMOL/L (ref 136–145)
TRIGL SERPL-MCNC: 85 MG/DL
TSH SERPL DL<=0.05 MIU/L-ACNC: 2.19 UIU/ML (ref 0.36–3.74)
WBC # BLD AUTO: 7.56 THOUSAND/UL (ref 4.31–10.16)

## 2021-01-28 PROCEDURE — 80053 COMPREHEN METABOLIC PANEL: CPT

## 2021-01-28 PROCEDURE — 86140 C-REACTIVE PROTEIN: CPT

## 2021-01-28 PROCEDURE — 36415 COLL VENOUS BLD VENIPUNCTURE: CPT

## 2021-01-28 PROCEDURE — 85025 COMPLETE CBC W/AUTO DIFF WBC: CPT

## 2021-01-28 PROCEDURE — 86038 ANTINUCLEAR ANTIBODIES: CPT

## 2021-01-28 PROCEDURE — 84443 ASSAY THYROID STIM HORMONE: CPT

## 2021-01-28 PROCEDURE — 86430 RHEUMATOID FACTOR TEST QUAL: CPT

## 2021-01-28 PROCEDURE — 80061 LIPID PANEL: CPT

## 2021-01-29 LAB — RYE IGE QN: NEGATIVE

## 2021-02-09 ENCOUNTER — PATIENT MESSAGE (OUTPATIENT)
Dept: FAMILY MEDICINE CLINIC | Facility: CLINIC | Age: 21
End: 2021-02-09

## 2021-02-09 DIAGNOSIS — R79.82 ELEVATED C-REACTIVE PROTEIN (CRP): ICD-10-CM

## 2021-02-09 DIAGNOSIS — M25.50 PAIN IN JOINT, MULTIPLE SITES: Primary | ICD-10-CM

## 2021-02-09 NOTE — TELEPHONE ENCOUNTER
From: Guero Morales  To: Kriss Gallo MD  Sent: 2/9/2021 1:18 PM EST  Subject: Test Results Question    Hi Dr Dwight Goldberg,   I was wondering how you interpreted my test results  I saw my c reactive protein was high, but it looks like I tested negative for rheumatoid arthritis and lupus  I was wondering what the next steps are  As my joint pain is still having frequent flare ups to the point of tears and keeping me up at night   Thank you, Arnie Katz

## 2021-03-01 ENCOUNTER — OFFICE VISIT (OUTPATIENT)
Dept: URGENT CARE | Age: 21
End: 2021-03-01
Payer: COMMERCIAL

## 2021-03-01 VITALS
TEMPERATURE: 97.5 F | RESPIRATION RATE: 18 BRPM | HEART RATE: 113 BPM | DIASTOLIC BLOOD PRESSURE: 78 MMHG | OXYGEN SATURATION: 98 % | SYSTOLIC BLOOD PRESSURE: 118 MMHG

## 2021-03-01 DIAGNOSIS — N30.01 ACUTE CYSTITIS WITH HEMATURIA: Primary | ICD-10-CM

## 2021-03-01 LAB
SL AMB  POCT GLUCOSE, UA: NEGATIVE
SL AMB LEUKOCYTE ESTERASE,UA: ABNORMAL
SL AMB POCT BILIRUBIN,UA: ABNORMAL
SL AMB POCT BLOOD,UA: ABNORMAL
SL AMB POCT CLARITY,UA: ABNORMAL
SL AMB POCT COLOR,UA: ABNORMAL
SL AMB POCT KETONES,UA: NEGATIVE
SL AMB POCT NITRITE,UA: NEGATIVE
SL AMB POCT PH,UA: 5
SL AMB POCT SPECIFIC GRAVITY,UA: 1.03
SL AMB POCT URINE HCG: NEGATIVE
SL AMB POCT URINE PROTEIN: NEGATIVE
SL AMB POCT UROBILINOGEN: 0.2

## 2021-03-01 PROCEDURE — 87186 SC STD MICRODIL/AGAR DIL: CPT | Performed by: FAMILY MEDICINE

## 2021-03-01 PROCEDURE — G0382 LEV 3 HOSP TYPE B ED VISIT: HCPCS | Performed by: PHYSICIAN ASSISTANT

## 2021-03-01 PROCEDURE — 81025 URINE PREGNANCY TEST: CPT | Performed by: PHYSICIAN ASSISTANT

## 2021-03-01 PROCEDURE — 87086 URINE CULTURE/COLONY COUNT: CPT | Performed by: FAMILY MEDICINE

## 2021-03-01 PROCEDURE — 81002 URINALYSIS NONAUTO W/O SCOPE: CPT | Performed by: PHYSICIAN ASSISTANT

## 2021-03-01 PROCEDURE — 87077 CULTURE AEROBIC IDENTIFY: CPT | Performed by: FAMILY MEDICINE

## 2021-03-01 RX ORDER — SULFAMETHOXAZOLE AND TRIMETHOPRIM 800; 160 MG/1; MG/1
1 TABLET ORAL EVERY 12 HOURS SCHEDULED
Qty: 14 TABLET | Refills: 0 | Status: SHIPPED | OUTPATIENT
Start: 2021-03-01 | End: 2021-03-08

## 2021-03-01 RX ORDER — PHENAZOPYRIDINE HYDROCHLORIDE 200 MG/1
200 TABLET, FILM COATED ORAL
Qty: 10 TABLET | Refills: 0 | Status: SHIPPED | OUTPATIENT
Start: 2021-03-01 | End: 2021-04-06 | Stop reason: ALTCHOICE

## 2021-03-01 NOTE — PROGRESS NOTES
[facility]        NAME: Santy Damon is a 23 y o  female  : 2000    MRN: [id number]  DATE: 2021  TIME: 2:44 PM    Assessment and Plan   Acute cystitis with hematuria [N30 01]  1  Acute cystitis with hematuria  POCT urine dip    POCT urine HCG    phenazopyridine (PYRIDIUM) 200 mg tablet    sulfamethoxazole-trimethoprim (BACTRIM DS) 800-160 mg per tablet         Patient Instructions        Urine dip in the office today suggestive of urinary tract infection  Urine culture will be sent out for sensitivity  You will be called if antibiotic needs to be changed  Began taking antibiotic as directed  Take with probiotic or yogurt to prevent stomach upset  Begin using Pyridium as needed to help with burning on urination  Continue to stay very well hydrated  Use over-the-counter medication as needed to help with pain  Proceed to the ER with any worsening of symptoms, fever, chills not responsive to over-the-counter medication, inability to void  Follow up with PCP in 3-5 days  Proceed to  ER if symptoms worsen  Chief Complaint     Chief Complaint   Patient presents with    Possible UTI     x this AM         History of Present Illness       17-year-old female presents the office for chief complaint of dysuria and hematuria that began for her this morning  Patient notes that she had once gotten frequent UTIs and that this feels very similar  Patient notes that she has been sexually active denies any of her partners having any STIs  Condoms are used  She has sex with men and women  Patient's last menstrual period was at the end of January  Patient has IUD implanted  Patient denies any flank pain  She states that she has regular vaginal discharge but denies any changes  She is able to eat and drink normally  She is voiding normally  She denies any fever chills  Review of Systems   Review of Systems   Constitutional: Negative for chills and fever  Respiratory: Negative for chest tightness and shortness of breath  Cardiovascular: Negative for chest pain and palpitations  Gastrointestinal: Negative for blood in stool, constipation, nausea and vomiting  Genitourinary: Positive for difficulty urinating, dysuria, hematuria and vaginal discharge  Negative for flank pain and vaginal pain  Neurological: Negative for dizziness and headaches           Current Medications       Current Outpatient Medications:     hydrochlorothiazide (HYDRODIURIL) 12 5 mg tablet, Take 1 tablet (12 5 mg total) by mouth daily, Disp: 90 tablet, Rfl: 3    levonorgestrel (KYLEENA) 19 5 MG intrauterine device, 1 Intra Uterine Device by Intrauterine route once, Disp: , Rfl:     phenazopyridine (PYRIDIUM) 200 mg tablet, Take 1 tablet (200 mg total) by mouth 3 (three) times a day with meals, Disp: 10 tablet, Rfl: 0    sertraline (ZOLOFT) 100 mg tablet, Take 1 tablet (100 mg total) by mouth daily, Disp: 30 tablet, Rfl: 2    sulfamethoxazole-trimethoprim (BACTRIM DS) 800-160 mg per tablet, Take 1 tablet by mouth every 12 (twelve) hours for 7 days, Disp: 14 tablet, Rfl: 0    SUMAtriptan (IMITREX) 25 mg tablet, Take 1 tablet (25 mg total) by mouth once as needed for migraine May repeat x 1 in one hour , Disp: 10 tablet, Rfl: 1    Current Allergies     Allergies as of 03/01/2021 - Reviewed 03/01/2021   Allergen Reaction Noted    Nitrofurantoin Hives 10/16/2017    Pollen extract Allergic Rhinitis 12/21/2016            The following portions of the patient's history were reviewed and updated as appropriate: allergies, current medications, past family history, past medical history, past social history, past surgical history and problem list      Past Medical History:   Diagnosis Date    Anxiety     Asthma     childhood    Depression     Elevated glycated hemoglobin     History of UTI     3 times/yr - last 4/2018    Hypertension     chronic    Kidney stone 2018    during pregnancy  Migraine     1-2 times/month    Obesity     Pseudohypoaldosteronism, type 2     Severe headache     Vaginal delivery 2019       Past Surgical History:   Procedure Laterality Date    TOOTH EXTRACTION         Family History   Problem Relation Age of Onset    Hypertension Mother     Nephrolithiasis Mother     Anxiety disorder Mother     Other Mother         Pseudohypoaldosteronism, type 2    Hypertension Father     Hyperlipidemia Father     Hypertension Maternal Grandfather     Heart disease Maternal Grandfather     Lupus Maternal Grandfather     Hypertension Paternal Grandmother     Hypertension Paternal Grandfather     Heart disease Paternal Grandfather     Diabetes Maternal Uncle     Arthritis Maternal Grandmother     Depression Maternal Grandmother     Anxiety disorder Maternal Grandmother     Diabetes type II Maternal Grandmother     Other Brother         Pseudohypoaldosteronism, type 2    No Known Problems Son     Breast cancer Neg Hx         paternal cousin          Medications have been verified  Objective   /78   Pulse (!) 113   Temp 97 5 °F (36 4 °C)   Resp 18   LMP 01/27/2021   SpO2 98%   Patient's last menstrual period was 01/27/2021  Physical Exam     Physical Exam  Vitals signs and nursing note reviewed  Constitutional:       General: She is not in acute distress  Appearance: She is well-developed  She is not ill-appearing or diaphoretic  HENT:      Head: Normocephalic and atraumatic  Right Ear: Hearing and external ear normal       Left Ear: Hearing and external ear normal       Nose: Nose normal  No mucosal edema or rhinorrhea  Mouth/Throat:      Pharynx: Uvula midline  No oropharyngeal exudate, posterior oropharyngeal erythema or uvula swelling  Eyes:      General: Lids are normal       Conjunctiva/sclera: Conjunctivae normal       Pupils: Pupils are equal, round, and reactive to light  Neck:      Musculoskeletal: Neck supple  Cardiovascular:      Rate and Rhythm: Normal rate and regular rhythm  Heart sounds: Normal heart sounds, S1 normal and S2 normal  No murmur  Pulmonary:      Effort: Pulmonary effort is normal  No respiratory distress  Breath sounds: Normal breath sounds  No stridor  No decreased breath sounds, wheezing or rales  Abdominal:      General: Bowel sounds are normal       Palpations: Abdomen is soft  Tenderness: There is abdominal tenderness in the suprapubic area  There is no right CVA tenderness or left CVA tenderness  Lymphadenopathy:      Cervical: No cervical adenopathy  Skin:     General: Skin is warm and dry  Findings: No rash  Neurological:      Mental Status: She is alert  Psychiatric:         Behavior: Behavior is cooperative

## 2021-03-01 NOTE — PATIENT INSTRUCTIONS
Urine dip in the office today suggestive of urinary tract infection  Urine culture will be sent out for sensitivity  You will be called if antibiotic needs to be changed  Began taking antibiotic as directed  Take with probiotic or yogurt to prevent stomach upset  Begin using Pyridium as needed to help with burning on urination  Continue to stay very well hydrated  Use over-the-counter medication as needed to help with pain  Proceed to the ER with any worsening of symptoms, fever, chills not responsive to over-the-counter medication, inability to void  Urinary Tract Infection in Women   WHAT YOU NEED TO KNOW:   A urinary tract infection (UTI) is caused by bacteria that get inside your urinary tract  Most bacteria that enter your urinary tract come out when you urinate  If the bacteria stay in your urinary tract, you may get an infection  Your urinary tract includes your kidneys, ureters, bladder, and urethra  Urine is made in your kidneys, and it flows from the ureters to the bladder  Urine leaves the bladder through the urethra  A UTI is more common in your lower urinary tract, which includes your bladder and urethra  DISCHARGE INSTRUCTIONS:   Return to the emergency department if:   · You are urinating very little or not at all  · You have a high fever with shaking chills  · You have side or back pain that gets worse  Call your doctor if:   · You have a fever  · You do not feel better after 2 days of taking antibiotics  · You are vomiting  · You have questions or concerns about your condition or care  Medicines:   · Antibiotics  help fight a bacterial infection  If you have UTIs often (called recurrent UTIs), you may be given antibiotics to take regularly  You will be given directions for when and how to use antibiotics  The goal is to prevent UTIs but not cause antibiotic resistance by using antibiotics too often      · Medicines  may be given to decrease pain and burning when you urinate  They will also help decrease the feeling that you need to urinate often  These medicines will make your urine orange or red  · Take your medicine as directed  Contact your healthcare provider if you think your medicine is not helping or if you have side effects  Tell him or her if you are allergic to any medicine  Keep a list of the medicines, vitamins, and herbs you take  Include the amounts, and when and why you take them  Bring the list or the pill bottles to follow-up visits  Carry your medicine list with you in case of an emergency  Follow up with your healthcare provider as directed:  Write down your questions so you remember to ask them during your visits  Prevent another UTI:   · Empty your bladder often  Urinate and empty your bladder as soon as you feel the need  Do not hold your urine for long periods of time  · Wipe from front to back after you urinate or have a bowel movement  This will help prevent germs from getting into your urinary tract through your urethra  · Drink liquids as directed  Ask how much liquid to drink each day and which liquids are best for you  You may need to drink more liquids than usual to help flush out the bacteria  Do not drink alcohol, caffeine, or citrus juices  These can irritate your bladder and increase your symptoms  Your healthcare provider may recommend cranberry juice to help prevent a UTI  · Urinate after you have sex  This can help flush out bacteria passed during sex  · Do not douche or use feminine deodorants  These can change the chemical balance in your vagina  · Change sanitary pads or tampons often  This will help prevent germs from getting into your urinary tract  · Talk to your healthcare provider about your birth control method  You may need to change your method if it is increasing your risk for UTIs  · Wear cotton underwear and clothes that are loose    Tight pants and nylon underwear can trap moisture and cause bacteria to grow  · Vaginal estrogen may be recommended  This medicine helps prevent UTIs in women who have gone through menopause or are in paul-menopause  · Do pelvic muscle exercises often  Pelvic muscle exercises may help you start and stop urinating  Strong pelvic muscles may help you empty your bladder easier  Squeeze these muscles tightly for 5 seconds like you are trying to hold back urine  Then relax for 5 seconds  Gradually work up to squeezing for 10 seconds  Do 3 sets of 15 repetitions a day, or as directed  © Copyright [address] [place] [address] Drive Information is for End User's use only and may not be sold, redistributed or otherwise used for commercial purposes  All illustrations and images included in CareNotes® are the copyrighted property of A D A M , Inc  or [address] [place], [place] [address]gus   The above information is an  only  It is not intended as medical advice for individual conditions or treatments  Talk to your doctor, nurse or pharmacist before following any medical regimen to see if it is safe and effective for you

## 2021-03-09 ENCOUNTER — TELEPHONE (OUTPATIENT)
Dept: FAMILY MEDICINE CLINIC | Facility: CLINIC | Age: 21
End: 2021-03-09

## 2021-03-09 ENCOUNTER — TELEMEDICINE (OUTPATIENT)
Dept: FAMILY MEDICINE CLINIC | Facility: CLINIC | Age: 21
End: 2021-03-09
Payer: COMMERCIAL

## 2021-03-09 DIAGNOSIS — E66.01 OBESITY, MORBID, BMI 40.0-49.9 (HCC): ICD-10-CM

## 2021-03-09 DIAGNOSIS — G43.909 MIGRAINE WITHOUT STATUS MIGRAINOSUS, NOT INTRACTABLE, UNSPECIFIED MIGRAINE TYPE: ICD-10-CM

## 2021-03-09 DIAGNOSIS — F32.1 DEPRESSION, MAJOR, SINGLE EPISODE, MODERATE (HCC): Primary | ICD-10-CM

## 2021-03-09 PROCEDURE — 99214 OFFICE O/P EST MOD 30 MIN: CPT | Performed by: FAMILY MEDICINE

## 2021-03-09 RX ORDER — SUMATRIPTAN 50 MG/1
50 TABLET, FILM COATED ORAL ONCE AS NEEDED
Qty: 10 TABLET | Refills: 3 | Status: SHIPPED | OUTPATIENT
Start: 2021-03-09 | End: 2021-11-09 | Stop reason: SDUPTHER

## 2021-03-09 NOTE — ASSESSMENT & PLAN NOTE
Increase imitrex to 50 mg daily  Take with nsaid  She will let me know if this is not helping  Discussed migraine prophylaxis, but with about one HA a week, will hold for now and work on treating the migraine more effectively before starting a daily med which patient prefers as well  Handout on migraine in after visit summary for patient to review

## 2021-03-09 NOTE — PATIENT INSTRUCTIONS
For depression/anxiety- keep zoloft at 100 mg daily, add wellbutrin xl 150 mg daily in the morning  Schedule with a therapist   Www [employer]  For migraines- increase the imitrex dose to 50 mg along with ibuprofen  Call if this isn't helping  Monitor your BP at home after starting wellbutrin  Call for an appointment with weight management  Migraine Headache   WHAT YOU NEED TO KNOW:   A migraine is a severe headache  The pain can be so severe that it interferes with your daily activities  A migraine can last a few hours up to several days  The exact cause of migraines is not known  DISCHARGE INSTRUCTIONS:   Return to the emergency department if:   · You have a headache that seems different or much worse than your usual migraine headache  · You have a severe headache with a fever or a stiff neck  · You have new problems with speech, vision, balance, or movement  · You feel like you are going to faint, you become confused, or you have a seizure  Contact your healthcare provider or neurologist if:   · Your migraines interfere with your daily activities  · Your medicines or treatments stop working  · You have questions or concerns about your condition or care  Medicines: You may need any of the following  Take medicine as soon as you feel a migraine begin  · Prescription pain medicine  may be given  Do not wait until the pain is severe before you take your medicine  · Migraine medicines  are used to help prevent a migraine or stop it once it starts  · Antinausea medicine  may be given to calm your stomach and to help prevent vomiting  This medicine can also help relieve pain  · Take your medicine as directed  Contact your healthcare provider if you think your medicine is not helping or if you have side effects  Tell him or her if you are allergic to any medicine  Keep a list of the medicines, vitamins, and herbs you take   Include the amounts, and when and why you take them  Bring the list or the pill bottles to follow-up visits  Carry your medicine list with you in case of an emergency  Manage your symptoms:   · Rest in a dark, quiet room  This will help decrease your pain  Sleep may also help relieve the pain  · Apply ice to decrease pain  Use an ice pack, or put crushed ice in a plastic bag  Cover the ice pack with a towel and place it on your head  Apply ice for 15 to 20 minutes every hour  · Apply heat to decrease pain and muscle spasms  Use a small towel dampened with warm water or a heating pad, or sit in a warm bath  Apply heat on the area for 20 to 30 minutes every 2 hours  You may alternate heat and ice  · Keep a migraine record  Write down when your migraines start and stop  Include your symptoms and what you were doing when a migraine began  Record what you ate or drank for 24 hours before the migraine started  Keep track of what you did to treat your migraine and if it worked  Bring the migraine record with you to visits with your healthcare provider  Follow up with your healthcare provider or neurologist as directed:  Bring your migraine record with you  Write down your questions so you remember to ask them during your visits  Prevent another migraine:   · Do not smoke  Nicotine and other chemicals in cigarettes and cigars can trigger a migraine or make it worse  Ask your healthcare provider for information if you currently smoke and need help to quit  E-cigarettes or smokeless tobacco still contain nicotine  Talk to your healthcare provider before you use these products  · Do not drink alcohol  Alcohol can trigger a migraine  It can also keep medicines used to treat your migraines from working  · Get regular exercise  Exercise may help prevent migraines  Talk to your healthcare provider about the best exercise plan for you  Try to get at least 30 minutes of exercise on most days  · Manage stress    Stress may trigger a migraine  Learn new ways to relax, such as deep breathing  · Create a sleep schedule  Go to bed and get up at the same times each day  Do not watch television before bed  · Eat regular meals  Include healthy foods such as include fruit, vegetables, whole-grain breads, low-fat dairy products, beans, lean meat, and fish  Do not have food or drinks that trigger your migraines  © Copyright [address] Information is for End User's use only and may not be sold, redistributed or otherwise used for commercial purposes  All illustrations and images included in CareNotes® are the copyrighted property of [employer]  or [address] [place] [address]  The above information is an  only  It is not intended as medical advice for individual conditions or treatments  Talk to your doctor, nurse or pharmacist before following any medical regimen to see if it is safe and effective for you  Counseling services:    [facility]   82 [facility], [address] [place] [address], [facility], [address] (they have equine therapy too)  [address] [place] [address],  [facility], 120 [facility] [address], [address],  [place], [address]    [address] [place] [phone]     1234 [facility]  200 [facility], [address]  [place] [address]    1135 [facility]   [address], [place], [address]     1200 [facility]   2102 [facility], [address]     Guadalupe Rene, MSW, LSW  (patients age 11-adult)   [address], [address] [place] [address], 243 [facility]  209 [facility], [address]  1000 [facility], [address], [facility], [address]  [facility] (specializing in [facility])  [facility], [address]  [address], [facility], 6100 [facility]   727 [facility], 403 [facility] , [address], [facility], 6[facility]   [phone]       Janice Jennings, MS, [facility], [address] [place] [address]   100 [facility], [address], [facility], [facility]5 [address] [place]  [address] [place] [address]  1160 [facility], [facility] [address]   306 [facility], 7[address] [facility] [address] [place] [id number] [phone]

## 2021-03-09 NOTE — ASSESSMENT & PLAN NOTE
Some improvement, though still w/ low motivation  We reviewed options  She will make appointment with a therapist as well as start wellbutrin xl 150 mg daily in the AM   Follow up in one month  She will message with today's blood pressure but notes she is running low normal at home  Advised to monitor when starting wellbutrin as this can raise BP

## 2021-03-09 NOTE — PROGRESS NOTES
Virtual Regular Visit      Assessment/Plan:    Problem List Items Addressed This Visit        Cardiovascular and Mediastinum    Migraine without status migrainosus, not intractable     Increase imitrex to 50 mg daily  Take with nsaid  She will let me know if this is not helping  Discussed migraine prophylaxis, but with about one HA a week, will hold for now and work on treating the migraine more effectively before starting a daily med which patient prefers as well  Handout on migraine in after visit summary for patient to review  Relevant Medications    SUMAtriptan (IMITREX) 50 mg tablet       Other    Depression, major, single episode, moderate (HCC) - Primary     Some improvement, though still w/ low motivation  We reviewed options  She will make appointment with a therapist as well as start wellbutrin xl 150 mg daily in the AM   Follow up in one month  She will message with today's blood pressure but notes she is running low normal at home  Advised to monitor when starting wellbutrin as this can raise BP  Other Visit Diagnoses     Obesity, morbid, BMI 40 0-49 9 ([address] )        She would like to schedule with weight management  referral order placed  Relevant Orders    Ambulatory referral to Weight Management               Reason for visit is   Chief Complaint   Patient presents with    Virtual Regular Visit        Encounter provider Lori Walker MD    Provider located at [address]  404 [facility] [address] [phone]      Recent Visits  No visits were found meeting these conditions     Showing recent visits within past 7 days and meeting all other requirements     Today's Visits  Date Type Provider Dept   03/09/21 Telephone Sergo France MA [facility]   03/09/21 Telemedicine Lori Walker MD [facility] today's visits and meeting all other requirements     Future Appointments  No visits were found meeting these conditions  Showing future appointments within next 150 days and meeting all other requirements        The patient was identified by name and date of birth  Dianne Merino was informed that this is a telemedicine visit and that the visit is being conducted through [facility] and patient was informed that this is a secure, HIPAA-compliant platform  She agrees to proceed     My office door was closed  No one else was in the room  She acknowledged consent and understanding of privacy and security of the video platform  The patient has agreed to participate and understands they can discontinue the visit at any time  Patient is aware this is a billable service  Subjective  Dianne Merino is a 23 y o  female  Following up for depression  At last OV increased zoloft to 100 mg daily  She thinks she is doing better overall  She still feels unmotivated  Feels tired, hard to get out of bed and get moving  Has a toddler, not as much sleep  Still feels like she doesn't want to do things- "overly lazy feeling"  Anxiety feels a bit better  She does still find she overreacts  A few weeks ago, she had an issue with school, was worried about money for school- she was panicking  BP has been consistently controlled on hctz 12 5 mg  Checking regularly  Willing to see therapist     She is still getting migraines  The imitrex helps when she takes it with advil  She is still getting HA once a week or so  The 25 mg of imitrex doesn't always seem enough  She sometimes gets nausea with migraine, not always    She is trying to watch diet with migraines    She would like referral to weight loss program       HPI     Past Medical History:   Diagnosis Date    Anxiety     Asthma     childhood    Depression     Elevated glycated hemoglobin     History of UTI     3 times/yr - last 4/2018    Hypertension     chronic    Kidney stone 2018    during pregnancy    Migraine     1-2 times/month    Obesity     Pseudohypoaldosteronism, type 2     Severe headache     Vaginal delivery 2019       Past Surgical History:   Procedure Laterality Date    TOOTH EXTRACTION         Current Outpatient Medications   Medication Sig Dispense Refill    hydrochlorothiazide (HYDRODIURIL) 12 5 mg tablet Take 1 tablet (12 5 mg total) by mouth daily 90 tablet 3    levonorgestrel (KYLEENA) 19 5 MG intrauterine device 1 Intra Uterine Device by Intrauterine route once      phenazopyridine (PYRIDIUM) 200 mg tablet Take 1 tablet (200 mg total) by mouth 3 (three) times a day with meals 10 tablet 0    sertraline (ZOLOFT) 100 mg tablet Take 1 tablet (100 mg total) by mouth daily 30 tablet 2    SUMAtriptan (IMITREX) 50 mg tablet Take 1 tablet (50 mg total) by mouth once as needed for migraine May repeat x 1 in one hour  10 tablet 3     No current facility-administered medications for this visit  Allergies   Allergen Reactions    Nitrofurantoin Hives     Oct 2017    Pollen Extract Allergic Rhinitis       Review of Systems    Video Exam    There were no vitals filed for this visit  - pt will send message  Physical Exam  Nursing note reviewed  Constitutional:       General: She is not in acute distress  Appearance: She is well-developed  She is not ill-appearing  HENT:      Head: Normocephalic and atraumatic  Eyes:      Conjunctiva/sclera: Conjunctivae normal    Pulmonary:      Effort: Pulmonary effort is normal  No respiratory distress  Neurological:      Mental Status: She is alert and oriented to person, place, and time  Psychiatric:         Mood and Affect: Mood normal          Behavior: Behavior normal           I spent 25 minutes directly with the patient during this visit      VIRTUAL VISIT 69 [facility] acknowledges that she has consented to an online visit or consultation   She understands that the online visit is based solely on information provided by her, and that, in the absence of a face-to-face physical evaluation by the physician, the diagnosis she receives is both limited and provisional in terms of accuracy and completeness  This is not intended to replace a full medical face-to-face evaluation by the physician   Mace understands and accepts these terms

## 2021-03-10 DIAGNOSIS — Z23 ENCOUNTER FOR IMMUNIZATION: ICD-10-CM

## 2021-03-12 ENCOUNTER — IMMUNIZATIONS (OUTPATIENT)
Dept: FAMILY MEDICINE CLINIC | Facility: HOSPITAL | Age: 21
End: 2021-03-12

## 2021-03-12 DIAGNOSIS — Z23 ENCOUNTER FOR IMMUNIZATION: Primary | ICD-10-CM

## 2021-03-12 PROCEDURE — 0001A SARS-COV-2 / COVID-19 MRNA VACCINE (PFIZER-BIONTECH) 30 MCG: CPT

## 2021-03-12 PROCEDURE — 91300 SARS-COV-2 / COVID-19 MRNA VACCINE (PFIZER-BIONTECH) 30 MCG: CPT

## 2021-04-02 NOTE — PROGRESS NOTES
Assessment and Plan:   Ms Mila Thakkar is a 64-year-old female with history significant for obesity, who presents for further evaluation of diffuse joint pains  She is referred by Dr Raisa Arboleda for a rheumatology consult  Junie Mejia presents today for further evaluation of diffuse arthralgias which she has been experiencing over the past 2 years, but with more prominent flare-ups noted in the past 4-5 months  She has noticed mild puffiness of certain joints such as her hands and wrists, but no prominent joint swelling and she does not describe prolonged morning stiffness affecting her joints either  There is no synovitis on her examination today and overall my suspicion for an underlying autoimmune etiology contributing to her joint pains is low  We will complete some additional workup to further evaluate and I will contact her with the results  If it is unrevealing then I requested she follow up with her primary care physician to discuss symptomatic management for the joint pains     - In regards to the elevated C reactive protein this is a minimal elevation and is a nonspecific marker  She does not describe complaints that would be concerning in the context of this value and it is possible for it to be elevated as a result of her body habitus  Plan:  Diagnoses and all orders for this visit:    Pain in joint, multiple sites  -     Cyclic citrul peptide antibody, IgG; Future  -     HLA-B27 antigen; Future  -     Sjogren's Antibodies; Future  -     Ambulatory referral to Rheumatology  -     XR hand 3+ vw right; Future  -     XR hand 3+ vw left; Future  -     XR knee 3 vw left non injury; Future  -     XR knee 3 vw right non injury; Future  -     Ferritin;  Future    Elevated C-reactive protein (CRP)  -     Ambulatory referral to Rheumatology    Class 3 severe obesity without serious comorbidity with body mass index (BMI) of 45 0 to 49 9 in adult, unspecified obesity type ([facility] )      I have personally reviewed pertinent films in PACS of the left ankle x-ray which is unremarkable  Activities as tolerated  Exercise: try to maintain a low impact exercise regimen as much as possible  Walk for 30 minutes a day for at least 3 days a week  Continue other medications as prescribed by PCP and other specialists  RTC PRN depending on results  HPI  Ms Dominique Villarreal is a 68-year-old female with history significant for obesity, who presents for further evaluation of diffuse joint pains  She is referred by Dr Philomena Gracia for a rheumatology consult  Patient reports she has overall had joint pains for about 1 and half to 2 years now, but it had been more intermittent and less intense in nature  She reports over the past 4-5 months although the pain is still intermittent, the intensity has been more severe  She may experience 4-5 days of severe pain where she does not feel like moving her joints and this will resolve spontaneously  She cannot attribute any aggravating factors for her symptoms  The joint pains will affect her hands, wrists, shoulders, knees and ankles  No significant joint pains in her elbows, hips or feet  She has had chronic swelling of her bilateral ankles which does not really change and at times when her hands and wrists are painful she does feel like they are slightly puffy  She does intermittently experience morning stiffness in her neck and upper back region that takes about 15 minutes to resolve  On days where her joint pains flares up she will try to take Tylenol or ibuprofen but this does not really help  She denies fevers, unintentional weight loss, dry eyes, significant dry mouth, inflammatory eye disease, skin rash, psoriasis or inflammatory bowel disease  She reports a history of osteoarthritis in her grandmother and a possible lupus diagnosis in her grandfather      In view of her joint pains she was seen by her primary care physician and had testing done which showed a slightly elevated C reactive protein of 14 6  An MARIA DOLORES, rheumatoid factor, CBC, CMP and TSH were unremarkable  The following portions of the patient's history were reviewed and updated as appropriate: allergies, current medications, past family history, past medical history, past social history, past surgical history and problem list       Review of Systems  Constitutional: Negative for weight change, fevers, chills, night sweats, fatigue  ENT/Mouth: Negative for hearing changes, ear pain, nasal congestion, sinus pain, hoarseness, sore throat, rhinorrhea, swallowing difficulty  Eyes: Negative for pain, redness, discharge, vision changes  Cardiovascular: Negative for chest pain, SOB, palpitations  Respiratory: Negative for cough, sputum, wheezing, dyspnea  Gastrointestinal: Negative for nausea, vomiting, diarrhea, constipation, pain, heartburn  Genitourinary: Negative for dysuria, urinary frequency, hematuria  Musculoskeletal: As per HPI  Skin: Negative for skin rash, color changes  Neuro: Negative for weakness, numbness, tingling, loss of consciousness  Psych: Negative for anxiety, depression  Heme/Lymph: Negative for easy bruising, bleeding, lymphadenopathy          Past Medical History:   Diagnosis Date    Anxiety     Asthma     childhood    Depression     Elevated glycated hemoglobin     History of UTI     3 times/yr - last 4/2018    Hypertension     chronic    Kidney stone 2018    during pregnancy    Migraine     1-2 times/month    Obesity     Pseudohypoaldosteronism, type 2     Severe headache     Vaginal delivery 2019       Past Surgical History:   Procedure Laterality Date    TOOTH EXTRACTION         Social History     Socioeconomic History    Marital status: Single     Spouse name: Not on file    Number of children: Not on file    Years of education: Not on file    Highest education level: Not on file   Occupational History    Not on file   Social Needs    Financial resource strain: Not on file    Food insecurity     Worry: Not on file     Inability: Not on file    Transportation needs     Medical: Not on file     Non-medical: Not on file   Tobacco Use    Smoking status: Never Smoker    Smokeless tobacco: Never Used   Substance and Sexual Activity    Alcohol use: Yes     Alcohol/week: 4 0 standard drinks     Types: 4 Cans of beer per week     Frequency: 2-3 times a week     Drinks per session: 1 or 2     Binge frequency: Never    Drug use: No    Sexual activity: Yes     Partners: Male     Birth control/protection: I U D     Lifestyle    Physical activity     Days per week: Not on file     Minutes per session: Not on file    Stress: Not on file   Relationships    Social connections     Talks on phone: Not on file     Gets together: Not on file     Attends [employer] service: Not on file     Active member of club or organization: Not on file     Attends meetings of clubs or organizations: Not on file     Relationship status: Not on file    Intimate partner violence     Fear of current or ex partner: Not on file     Emotionally abused: Not on file     Physically abused: Not on file     Forced sexual activity: Not on file   Other Topics Concern    Not on file   Social History Narrative    Single     1 son, Merlene Welsh    No pets             Family History   Problem Relation Age of Onset    Hypertension Mother     Nephrolithiasis Mother     Anxiety disorder Mother     Other Mother         Pseudohypoaldosteronism, type 2    Hypertension Father     Hyperlipidemia Father     Hypertension Maternal Grandfather     Heart disease Maternal Grandfather     Lupus Maternal Grandfather     Hypertension Paternal Grandmother     Hypertension Paternal Grandfather     Heart disease Paternal Grandfather     Diabetes Maternal Uncle     Arthritis Maternal Grandmother     Depression Maternal Grandmother     Anxiety disorder Maternal Grandmother     Diabetes type II Maternal Grandmother     Other Brother         Pseudohypoaldosteronism, type 2    No Known Problems Son     Breast cancer Neg Hx         paternal cousin        Allergies   Allergen Reactions    Nitrofurantoin Hives     Oct 2017    Pollen Extract Allergic Rhinitis       Current Outpatient Medications:     buPROPion (WELLBUTRIN XL) 150 mg 24 hr tablet, Take 1 tablet (150 mg total) by mouth every morning, Disp: 30 tablet, Rfl: 1    hydrochlorothiazide (HYDRODIURIL) 12 5 mg tablet, Take 1 tablet (12 5 mg total) by mouth daily, Disp: 90 tablet, Rfl: 3    levonorgestrel (KYLEENA) 19 5 MG intrauterine device, 1 Intra Uterine Device by Intrauterine route once, Disp: , Rfl:     phenazopyridine (PYRIDIUM) 200 mg tablet, Take 1 tablet (200 mg total) by mouth 3 (three) times a day with meals, Disp: 10 tablet, Rfl: 0    sertraline (ZOLOFT) 100 mg tablet, Take 1 tablet (100 mg total) by mouth daily, Disp: 30 tablet, Rfl: 2    SUMAtriptan (IMITREX) 50 mg tablet, Take 1 tablet (50 mg total) by mouth once as needed for migraine May repeat x 1 in one hour , Disp: 10 tablet, Rfl: 3      Objective:    Vitals:    04/05/21 1046   BP: 149/94   BP Location: Left arm   Patient Position: Sitting   Cuff Size: Extra-Large   Pulse: 88   Temp: (!) 97 4 °F (36 3 °C)   Weight: 119 kg (263 lb)       Physical Exam  General: Well appearing, well nourished, in no distress  Oriented x 3, normal mood and affect  Ambulating without difficulty  Obese  Skin: Good turgor, no rash, unusual bruising or prominent lesions  Hair: Normal texture and distribution  Nails: Normal color, no deformities  HEENT:  Head: Normocephalic, atraumatic  Eyes: Conjunctiva clear, sclera non-icteric, EOM intact  Extremities: No amputations or deformities, cyanosis, edema    Musculoskeletal:   She does report tenderness to palpation of the small joints of her right hand, left elbow and of her feet, but there is no soft tissue swelling noted of any of her joints, except for chronic swelling that she describes of her bilateral ankles  Negative fibromyalgia tender points  Neurologic: Alert and oriented  No focal neurological deficits appreciated  Psychiatric: Normal mood and affect  ELIANA Harrell    Rheumatology

## 2021-04-05 ENCOUNTER — CONSULT (OUTPATIENT)
Dept: RHEUMATOLOGY | Facility: CLINIC | Age: 21
End: 2021-04-05
Payer: COMMERCIAL

## 2021-04-05 ENCOUNTER — IMMUNIZATIONS (OUTPATIENT)
Dept: FAMILY MEDICINE CLINIC | Facility: HOSPITAL | Age: 21
End: 2021-04-05
Payer: COMMERCIAL

## 2021-04-05 ENCOUNTER — APPOINTMENT (OUTPATIENT)
Dept: LAB | Facility: HOSPITAL | Age: 21
End: 2021-04-05
Attending: INTERNAL MEDICINE
Payer: COMMERCIAL

## 2021-04-05 ENCOUNTER — APPOINTMENT (OUTPATIENT)
Dept: RADIOLOGY | Facility: CLINIC | Age: 21
End: 2021-04-05
Payer: COMMERCIAL

## 2021-04-05 VITALS
HEART RATE: 88 BPM | WEIGHT: 263 LBS | TEMPERATURE: 97.4 F | BODY MASS INDEX: 46.59 KG/M2 | DIASTOLIC BLOOD PRESSURE: 94 MMHG | SYSTOLIC BLOOD PRESSURE: 149 MMHG

## 2021-04-05 DIAGNOSIS — M25.50 PAIN IN JOINT, MULTIPLE SITES: ICD-10-CM

## 2021-04-05 DIAGNOSIS — M25.50 PAIN IN JOINT, MULTIPLE SITES: Primary | ICD-10-CM

## 2021-04-05 DIAGNOSIS — R79.82 ELEVATED C-REACTIVE PROTEIN (CRP): ICD-10-CM

## 2021-04-05 DIAGNOSIS — Z23 ENCOUNTER FOR IMMUNIZATION: Primary | ICD-10-CM

## 2021-04-05 DIAGNOSIS — E66.01 CLASS 3 SEVERE OBESITY WITHOUT SERIOUS COMORBIDITY WITH BODY MASS INDEX (BMI) OF 45.0 TO 49.9 IN ADULT, UNSPECIFIED OBESITY TYPE (HCC): ICD-10-CM

## 2021-04-05 LAB — FERRITIN SERPL-MCNC: 22 NG/ML (ref 8–388)

## 2021-04-05 PROCEDURE — 36415 COLL VENOUS BLD VENIPUNCTURE: CPT

## 2021-04-05 PROCEDURE — 82728 ASSAY OF FERRITIN: CPT

## 2021-04-05 PROCEDURE — 81374 HLA I TYPING 1 ANTIGEN LR: CPT

## 2021-04-05 PROCEDURE — 0002A SARS-COV-2 / COVID-19 MRNA VACCINE (PFIZER-BIONTECH) 30 MCG: CPT

## 2021-04-05 PROCEDURE — 73562 X-RAY EXAM OF KNEE 3: CPT

## 2021-04-05 PROCEDURE — 86200 CCP ANTIBODY: CPT

## 2021-04-05 PROCEDURE — 73130 X-RAY EXAM OF HAND: CPT

## 2021-04-05 PROCEDURE — 91300 SARS-COV-2 / COVID-19 MRNA VACCINE (PFIZER-BIONTECH) 30 MCG: CPT

## 2021-04-05 PROCEDURE — 86235 NUCLEAR ANTIGEN ANTIBODY: CPT

## 2021-04-05 PROCEDURE — 99245 OFF/OP CONSLTJ NEW/EST HI 55: CPT | Performed by: INTERNAL MEDICINE

## 2021-04-06 ENCOUNTER — TELEMEDICINE (OUTPATIENT)
Dept: FAMILY MEDICINE CLINIC | Facility: CLINIC | Age: 21
End: 2021-04-06
Payer: COMMERCIAL

## 2021-04-06 ENCOUNTER — PATIENT MESSAGE (OUTPATIENT)
Dept: FAMILY MEDICINE CLINIC | Facility: CLINIC | Age: 21
End: 2021-04-06

## 2021-04-06 VITALS
TEMPERATURE: 99.2 F | BODY MASS INDEX: 46.6 KG/M2 | DIASTOLIC BLOOD PRESSURE: 87 MMHG | HEIGHT: 63 IN | HEART RATE: 85 BPM | SYSTOLIC BLOOD PRESSURE: 147 MMHG | WEIGHT: 263 LBS

## 2021-04-06 DIAGNOSIS — F32.1 DEPRESSION, MAJOR, SINGLE EPISODE, MODERATE (HCC): ICD-10-CM

## 2021-04-06 DIAGNOSIS — R06.83 SNORING: ICD-10-CM

## 2021-04-06 DIAGNOSIS — F33.1 DEPRESSION, MAJOR, RECURRENT, MODERATE (HCC): ICD-10-CM

## 2021-04-06 DIAGNOSIS — R53.83 FATIGUE, UNSPECIFIED TYPE: ICD-10-CM

## 2021-04-06 DIAGNOSIS — R03.0 ELEVATED BP WITHOUT DIAGNOSIS OF HYPERTENSION: Primary | ICD-10-CM

## 2021-04-06 LAB
ENA SS-A AB SER-ACNC: <0.2 AI (ref 0–0.9)
ENA SS-B AB SER-ACNC: <0.2 AI (ref 0–0.9)

## 2021-04-06 PROCEDURE — 99214 OFFICE O/P EST MOD 30 MIN: CPT | Performed by: FAMILY MEDICINE

## 2021-04-06 RX ORDER — BUPROPION HYDROCHLORIDE 150 MG/1
150 TABLET ORAL EVERY MORNING
Qty: 90 TABLET | Refills: 1 | Status: SHIPPED | OUTPATIENT
Start: 2021-04-06 | End: 2021-11-09 | Stop reason: SDUPTHER

## 2021-04-06 RX ORDER — SERTRALINE HYDROCHLORIDE 100 MG/1
100 TABLET, FILM COATED ORAL DAILY
Qty: 90 TABLET | Refills: 1 | Status: SHIPPED | OUTPATIENT
Start: 2021-04-06 | End: 2021-11-09 | Stop reason: SDUPTHER

## 2021-04-06 NOTE — PROGRESS NOTES
Virtual Regular Visit      Assessment/Plan:    Problem List Items Addressed This Visit        Other    Depression, major, single episode, moderate (HCC)    Relevant Medications    sertraline (ZOLOFT) 100 mg tablet    buPROPion (WELLBUTRIN XL) 150 mg 24 hr tablet      Other Visit Diagnoses     Elevated BP without diagnosis of hypertension    -  Primary    Snoring        Relevant Orders    Home Study    Fatigue, unspecified type        Relevant Orders    Home Study    Depression, major, recurrent, moderate (HCC)        Relevant Medications    sertraline (ZOLOFT) 100 mg tablet    buPROPion (WELLBUTRIN XL) 150 mg 24 hr tablet        Patient Instructions   Check BP at home for the next week and let me know the results  Let me know about the rheumatology labs  Continue the same doses of zoloft and wellbutrin  Schedule the sleep study  You can call [facility] to schedule your test at [phone]  They are available M-F 7AM-7PM and Saturday 8AM-noon  Reason for visit is   Chief Complaint   Patient presents with    Follow-up     4wk mood f/u    Virtual Regular Visit        Encounter provider Rick Bush MD    Provider located at [address] [place] [id number] [phone]      Recent Visits  Date Type Provider Dept   04/06/21 Telemedicine Rick Bush MD Pg  [facility] recent visits within past 7 days and meeting all other requirements     Future Appointments  No visits were found meeting these conditions  Showing future appointments within next 150 days and meeting all other requirements        The patient was identified by name and date of birth  Mahamed Lutz was informed that this is a telemedicine visit and that the visit is being conducted through [facility] and patient was informed that this is a secure, HIPAA-compliant platform  She agrees to proceed     My office door was closed  No one else was in the room  She acknowledged consent and understanding of privacy and security of the video platform  The patient has agreed to participate and understands they can discontinue the visit at any time  Patient is aware this is a billable service  Subjective  Bunny Rasmussen is a 23 y o  female following up on mood  She notes she is feeling more moitvated, mood improved  She feels daytime fatigue  Snores  Son sleeps in bed with her  HPI     Past Medical History:   Diagnosis Date    Anxiety     Asthma     childhood    Depression     Elevated glycated hemoglobin     History of UTI     3 times/yr - last 4/2018    Hypertension     chronic    Kidney stone 2018    during pregnancy    Migraine     1-2 times/month    Obesity     Pseudohypoaldosteronism, type 2     Severe headache     Vaginal delivery 2019       Past Surgical History:   Procedure Laterality Date    TOOTH EXTRACTION         Current Outpatient Medications   Medication Sig Dispense Refill    buPROPion (WELLBUTRIN XL) 150 mg 24 hr tablet Take 1 tablet (150 mg total) by mouth every morning 90 tablet 1    hydrochlorothiazide (HYDRODIURIL) 12 5 mg tablet Take 1 tablet (12 5 mg total) by mouth daily 90 tablet 3    levonorgestrel (KYLEENA) 19 5 MG intrauterine device 1 Intra Uterine Device by Intrauterine route once      sertraline (ZOLOFT) 100 mg tablet Take 1 tablet (100 mg total) by mouth daily 90 tablet 1    SUMAtriptan (IMITREX) 50 mg tablet Take 1 tablet (50 mg total) by mouth once as needed for migraine May repeat x 1 in one hour  10 tablet 3     No current facility-administered medications for this visit           Allergies   Allergen Reactions    Nitrofurantoin Hives     Oct 2017    Pollen Extract Allergic Rhinitis       Review of Systems    Video Exam    Vitals:    04/06/21 1734   BP: 147/87   Pulse: 85   Temp: 99 2 °F (37 3 °C)   Weight: 119 kg (263 lb)   Height: 5' 3" (1 6 m)     BP Readings from Last 3 Encounters:   04/06/21 147/87   04/05/21 149/94   03/01/21 118/78         Physical Exam  Vitals signs and nursing note reviewed  Constitutional:       Appearance: She is well-developed  HENT:      Head: Normocephalic and atraumatic  Eyes:      General:         Right eye: No discharge  Left eye: No discharge  Conjunctiva/sclera: Conjunctivae normal    Pulmonary:      Effort: Pulmonary effort is normal  No respiratory distress  Breath sounds: No stridor  Neurological:      Mental Status: She is alert and oriented to person, place, and time  Psychiatric:         Mood and Affect: Mood normal          Behavior: Behavior normal           I spent 20 minutes directly with the patient during this visit      VIRTUAL VISIT 69 [facility] acknowledges that she has consented to an online visit or consultation  She understands that the online visit is based solely on information provided by her, and that, in the absence of a face-to-face physical evaluation by the physician, the diagnosis she receives is both limited and provisional in terms of accuracy and completeness  This is not intended to replace a full medical face-to-face evaluation by the physician  Alysa Granado understands and accepts these terms

## 2021-04-06 NOTE — PATIENT INSTRUCTIONS
Check BP at home for the next week and let me know the results  Let me know about the rheumatology labs  Continue the same doses of zoloft and wellbutrin  Schedule the sleep study  You can call [facility]'s central scheduling to schedule your test at [phone]  They are available M-F 7AM-7PM and Saturday 8AM-noon

## 2021-04-07 LAB — CCP AB SER IA-ACNC: 0.7

## 2021-04-07 NOTE — TELEPHONE ENCOUNTER
From: Shayan Byrd  To: Fransisca Guevara MD  Sent: 4/6/2021 5:31 PM EDT  Subject: Non-Urgent Medical Question    Hi Dr Adrianna Babinski    We were supposed to have a virtual visit today at [address] [place] [address],[address], its been half an hour now and I am not sure if my appointment has been canceled or not  I understand issues come up, and am typically flexible but I haven't received a call from the office at all about this  Please let me know what is going on      Thanks,  Stewart Vance

## 2021-04-09 LAB — HLA-B27 QL NAA+PROBE: NEGATIVE

## 2021-05-03 DIAGNOSIS — F32.1 DEPRESSION, MAJOR, SINGLE EPISODE, MODERATE (HCC): ICD-10-CM

## 2021-05-03 DIAGNOSIS — F33.1 DEPRESSION, MAJOR, RECURRENT, MODERATE (HCC): ICD-10-CM

## 2021-05-03 RX ORDER — BUPROPION HYDROCHLORIDE 150 MG/1
150 TABLET ORAL EVERY MORNING
Qty: 90 TABLET | Refills: 1 | Status: CANCELLED | OUTPATIENT
Start: 2021-05-03 | End: 2021-10-30

## 2021-05-03 RX ORDER — SERTRALINE HYDROCHLORIDE 100 MG/1
100 TABLET, FILM COATED ORAL DAILY
Qty: 90 TABLET | Refills: 1 | Status: CANCELLED | OUTPATIENT
Start: 2021-05-03

## 2021-06-01 ENCOUNTER — TELEPHONE (OUTPATIENT)
Dept: SLEEP CENTER | Facility: CLINIC | Age: 21
End: 2021-06-01

## 2021-06-01 NOTE — TELEPHONE ENCOUNTER
----- Message from Osvaldo Ríos DO sent at 5/31/2021  9:39 PM EDT -----  approved  ----- Message -----  From: Елена Waller  Sent: 5/6/2021   9:07 AM EDT  To: Sleep Medicine [place] Provider    This sleep study needs approval      If approved please sign and return to clerical pool  If denied please include reasons why  Also provide alternative testing if warranted  Please sign and return to clerical pool

## 2021-07-26 ENCOUNTER — TELEMEDICINE (OUTPATIENT)
Dept: FAMILY MEDICINE CLINIC | Facility: CLINIC | Age: 21
End: 2021-07-26
Payer: COMMERCIAL

## 2021-07-26 VITALS — HEIGHT: 63 IN | TEMPERATURE: 99.5 F | BODY MASS INDEX: 46.6 KG/M2 | WEIGHT: 263 LBS

## 2021-07-26 DIAGNOSIS — B34.9 VIRAL ILLNESS: Primary | ICD-10-CM

## 2021-07-26 DIAGNOSIS — B34.9 VIRAL INFECTION, UNSPECIFIED: ICD-10-CM

## 2021-07-26 PROCEDURE — U0003 INFECTIOUS AGENT DETECTION BY NUCLEIC ACID (DNA OR RNA); SEVERE ACUTE RESPIRATORY SYNDROME CORONAVIRUS 2 (SARS-COV-2) (CORONAVIRUS DISEASE [COVID-19]), AMPLIFIED PROBE TECHNIQUE, MAKING USE OF HIGH THROUGHPUT TECHNOLOGIES AS DESCRIBED BY CMS-2020-01-R: HCPCS | Performed by: FAMILY MEDICINE

## 2021-07-26 PROCEDURE — 99214 OFFICE O/P EST MOD 30 MIN: CPT | Performed by: FAMILY MEDICINE

## 2021-07-26 PROCEDURE — U0005 INFEC AGEN DETEC AMPLI PROBE: HCPCS | Performed by: FAMILY MEDICINE

## 2021-07-26 RX ORDER — FLUTICASONE PROPIONATE 50 MCG
2 SPRAY, SUSPENSION (ML) NASAL DAILY
Qty: 11.1 ML | Refills: 0 | Status: SHIPPED | OUTPATIENT
Start: 2021-07-26 | End: 2021-08-17

## 2021-07-26 NOTE — PROGRESS NOTES
COVID-19 Outpatient Progress Note    Assessment/Plan:    Problem List Items Addressed This Visit     None         Disposition:     I referred patient to one of our centralized sites for a COVID-19 swab  Note for work  No work until fever free x 24 hours and results of Covid-19 testing available and reviewed  Continue tylenol prn  Add flonase  Rest  Increase hydration    I have spent 20 minutes directly with the patient  Greater than 50% of this time was spent in counseling/coordination of care regarding: instructions for management  Verification of patient location:    Patient is located in the following state in which I hold an active license [place]    Encounter provider Diego Ma MD    Provider located at [address] [place] [address],[address]  [address] [facility] [address] [phone]    Recent Visits  No visits were found meeting these conditions  Showing recent visits within past 7 days and meeting all other requirements  Today's Visits  Date Type Provider Dept   07/26/21 Telemedicine Diego Ma MD Pg [facility] today's visits and meeting all other requirements  Future Appointments  No visits were found meeting these conditions  Showing future appointments within next 150 days and meeting all other requirements     This virtual check-in was done via [employer] and patient was informed that this is a secure, HIPAA-compliant platform  She agrees to proceed  Patient agrees to participate in a virtual check in via telephone or video visit instead of presenting to the office to address urgent/immediate medical needs  Patient is aware this is a billable service  After connecting through [facility], the patient was identified by name and date of birth  Lea Montemayor was informed that this was a telemedicine visit and that the exam was being conducted confidentially over secure lines  My office door was closed  No one else was in the room  Shireen Leach acknowledged consent and understanding of privacy and security of the telemedicine visit  I informed the patient that I have reviewed her record in Epic and presented the opportunity for her to ask any questions regarding the visit today  The patient agreed to participate  Subjective:   Shireen Leach is a 19 y o  female who is concerned about COVID-19  Patient's symptoms include fever, chills, fatigue, malaise, nasal congestion, rhinorrhea, sore throat (improving a bit as of yesterday), nausea, diarrhea, myalgias and headache  Patient denies anosmia, loss of taste, cough, shortness of breath, chest tightness, abdominal pain and vomiting  Date of symptom onset: 7/23/2021    Exposure:   Contact with a person who is under investigation (PUI) for or who is positive for COVID-19 within the last 14 days?: No    Hospitalized recently for fever and/or lower respiratory symptoms?: No      Currently a healthcare worker that is involved in direct patient care?: No      Works in a special setting where the risk of COVID-19 transmission may be high? (this may include long-term care, correctional and prison facilities; homeless shelters; assisted-living facilities and group homes ): No      Resident in a special setting where the risk of COVID-19 transmission may be high? (this may include long-term care, correctional and prison facilities; homeless shelters; assisted-living facilities and group homes ): No      Started early Friday AM not feeling well  Congested, fever  Over the weekend lightheaded, HA  Diarrhea  Using tylenol b/c her stomach is too upset to take anything else  Today a little more appetite  Eating bland foods  She is drinking well- water, gatorade, tea  Hard b/c of the nausea  Hard to get comfortable, Su Minaya     Brother was sick with milder symptoms last week- fever resolved for him after about 3 days        last fever overnight last night    Lab Results Component Value Date    SARSCOV2 Not Detected 01/08/2021    SARSCOV2 Negative 08/03/2020     Past Medical History:   Diagnosis Date    Anxiety     Asthma     childhood    Depression     Elevated glycated hemoglobin     History of UTI     3 times/yr - last 4/2018    Hypertension     chronic    Kidney stone 2018    during pregnancy    Migraine     1-2 times/month    Obesity     Pseudohypoaldosteronism, type 2     Severe headache     Vaginal delivery 2019     Past Surgical History:   Procedure Laterality Date    TOOTH EXTRACTION       Current Outpatient Medications   Medication Sig Dispense Refill    buPROPion (WELLBUTRIN XL) 150 mg 24 hr tablet Take 1 tablet (150 mg total) by mouth every morning 90 tablet 1    hydrochlorothiazide (HYDRODIURIL) 12 5 mg tablet Take 1 tablet (12 5 mg total) by mouth daily 90 tablet 3    levonorgestrel (KYLEENA) 19 5 MG intrauterine device 1 Intra Uterine Device by Intrauterine route once      sertraline (ZOLOFT) 100 mg tablet Take 1 tablet (100 mg total) by mouth daily 90 tablet 1    SUMAtriptan (IMITREX) 50 mg tablet Take 1 tablet (50 mg total) by mouth once as needed for migraine May repeat x 1 in one hour  10 tablet 3     No current facility-administered medications for this visit  Allergies   Allergen Reactions    Nitrofurantoin Hives     Oct 2017    Pollen Extract Allergic Rhinitis       Review of Systems   Constitutional: Positive for chills, fatigue and fever  HENT: Positive for congestion, rhinorrhea and sore throat (improving a bit as of yesterday)  Respiratory: Negative for cough, chest tightness and shortness of breath  Gastrointestinal: Positive for diarrhea and nausea  Negative for abdominal pain and vomiting  Musculoskeletal: Positive for myalgias  Neurological: Positive for headaches         Objective:    Vitals:    07/26/21 0823   Temp: 99 5 °F (37 5 °C)   Weight: 119 kg (263 lb)   Height: 5' 3" (1 6 m)       Physical Exam  Vitals and nursing note reviewed  Constitutional:       General: She is not in acute distress  Appearance: Normal appearance  She is ill-appearing  She is not toxic-appearing  HENT:      Head: Normocephalic and atraumatic  Nose: Congestion present  Mouth/Throat:      Mouth: Mucous membranes are moist    Pulmonary:      Effort: Pulmonary effort is normal  No respiratory distress  Abdominal:      Palpations: Abdomen is soft  Tenderness: There is abdominal tenderness (mild epigastric tenderness on patient's self-exam)  Neurological:      Mental Status: She is alert and oriented to person, place, and time  Psychiatric:         Behavior: Behavior normal          VIRTUAL VISIT DISCLAIMER    Shireen Leach verbally agrees to participate in [employer]  Pt is aware that [employer] could be limited without vital signs or the ability to perform a full hands-on physical exam  Samnatha Paz understands she or the provider may request at any time to terminate the video visit and request the patient to seek care or treatment in person

## 2021-07-28 ENCOUNTER — PATIENT MESSAGE (OUTPATIENT)
Dept: FAMILY MEDICINE CLINIC | Facility: CLINIC | Age: 21
End: 2021-07-28

## 2021-07-29 ENCOUNTER — TELEMEDICINE (OUTPATIENT)
Dept: FAMILY MEDICINE CLINIC | Facility: CLINIC | Age: 21
End: 2021-07-29
Payer: COMMERCIAL

## 2021-07-29 VITALS — HEIGHT: 63 IN | WEIGHT: 263 LBS | TEMPERATURE: 98.6 F | BODY MASS INDEX: 46.6 KG/M2

## 2021-07-29 DIAGNOSIS — U07.1 COVID-19 VIRUS INFECTION: Primary | ICD-10-CM

## 2021-07-29 PROCEDURE — 99212 OFFICE O/P EST SF 10 MIN: CPT | Performed by: FAMILY MEDICINE

## 2021-07-29 NOTE — PROGRESS NOTES
COVID-19 Outpatient Progress Note    Assessment/Plan:    Problem List Items Addressed This Visit     None         Disposition:     I recommended continued isolation until at least 24 hours have passed since recovery defined as resolution of fever without the use of fever-reducing medications AND improvement in COVID symptoms AND 10 days have passed since onset of symptoms (or 10 days have passed since date of first positive viral diagnostic test for asymptomatic patients)  For exposure to Covid-19 in vaccinated patients: If you've been around someone who has COVID-19, you should get tested 3-5 days after your exposure, even if you don't have symptoms  You should also wear a mask indoors in public for 14 days following exposure or until your test result is negative  You should isolate for 10 days if your test result is positive  I have spent 12 minutes directly with the patient  Greater than 50% of this time was spent in counseling/coordination of care regarding: instructions for management  Verification of patient location:    Patient is located in the following state in which I hold an active license [place]    Encounter provider Flaquita Crook MD    Provider located at [address] [address]  [phone]    Recent Visits  Date Type Provider Dept   07/26/21 Telemedicine Flaquita Crook MD [facility] recent visits within past 7 days and meeting all other requirements  Today's Visits  Date Type Provider Dept   07/29/21 Telemedicine Flaquita Crook MD [facility] today's visits and meeting all other requirements  Future Appointments  No visits were found meeting these conditions  Showing future appointments within next 150 days and meeting all other requirements     This virtual check-in was done via [employer] and patient was informed that this is a secure, HIPAA-compliant platform   She agrees to proceed  Patient agrees to participate in a virtual check in via telephone or video visit instead of presenting to the office to address urgent/immediate medical needs  Patient is aware this is a billable service  After connecting through [facility] [place], the patient was identified by name and date of birth  Milind Gracia was informed that this was a telemedicine visit and that the exam was being conducted confidentially over secure lines  My office door was closed  No one else was in the room  Milind Gracia acknowledged consent and understanding of privacy and security of the telemedicine visit  I informed the patient that I have reviewed her record in Epic and presented the opportunity for her to ask any questions regarding the visit today  The patient agreed to participate  Subjective:   Milind Gracia is a 19 y o  female who has been screened for COVID-19  Symptom change since last report: improving  Patient's symptoms include fatigue, nasal congestion, rhinorrhea, anosmia, cough (rarely) and myalgias  Patient denies chills, sore throat, loss of taste, shortness of breath, chest tightness, nausea, vomiting, diarrhea and headaches  Laurence has been staying home and has isolated themselves in her home  She is taking care to not share personal items and     Date of symptom onset: 7/23/2021  Date of positive COVID-19 PCR: 7/26/2021    Pt notes she feels better today than on Monday  Right now she feels tired easily  Rare cough  Mostly bothered by fatigue and congestion  Other symptoms have improved  No sense of smell but can taste      Lab Results   Component Value Date    SARSCOV2 Positive (A) 07/26/2021    Authur Oddi Not Detected 01/08/2021    SARSCOV2 Negative 08/03/2020     Past Medical History:   Diagnosis Date    Anxiety     Asthma     childhood    Depression     Elevated glycated hemoglobin     History of UTI     3 times/yr - last 4/2018    Hypertension     chronic    Kidney stone 2018    during pregnancy    Migraine     1-2 times/month    Obesity     Pseudohypoaldosteronism, type 2     Severe headache     Vaginal delivery 2019     Past Surgical History:   Procedure Laterality Date    TOOTH EXTRACTION       Current Outpatient Medications   Medication Sig Dispense Refill    buPROPion (WELLBUTRIN XL) 150 mg 24 hr tablet Take 1 tablet (150 mg total) by mouth every morning 90 tablet 1    fluticasone (FLONASE) 50 mcg/act nasal spray 2 sprays into each nostril daily As needed for congestion 11 1 mL 0    hydrochlorothiazide (HYDRODIURIL) 12 5 mg tablet Take 1 tablet (12 5 mg total) by mouth daily 90 tablet 3    levonorgestrel (KYLEENA) 19 5 MG intrauterine device 1 Intra Uterine Device by Intrauterine route once      sertraline (ZOLOFT) 100 mg tablet Take 1 tablet (100 mg total) by mouth daily 90 tablet 1    SUMAtriptan (IMITREX) 50 mg tablet Take 1 tablet (50 mg total) by mouth once as needed for migraine May repeat x 1 in one hour  10 tablet 3     No current facility-administered medications for this visit  Allergies   Allergen Reactions    Nitrofurantoin Hives     Oct 2017    Pollen Extract Allergic Rhinitis       Review of Systems   Constitutional: Positive for fatigue  Negative for chills  HENT: Positive for congestion and rhinorrhea  Negative for sore throat  Respiratory: Positive for cough (rarely)  Negative for chest tightness and shortness of breath  Gastrointestinal: Negative for diarrhea, nausea and vomiting  Musculoskeletal: Positive for myalgias  Neurological: Negative for headaches  Objective:    Vitals:    07/29/21 1249   Temp: 98 6 °F (37 °C)   Weight: 119 kg (263 lb)   Height: 5' 3" (1 6 m)       Physical Exam  Vitals and nursing note reviewed  Constitutional:       General: She is not in acute distress  Appearance: Normal appearance  She is not ill-appearing or toxic-appearing  HENT:      Nose: Congestion present  Neurological:      Mental Status: She is alert  Psychiatric:         Mood and Affect: Mood normal          Behavior: Behavior normal          VIRTUAL VISIT DISCLAIMER    Augusto Grandchild verbally agrees to participate in [employer]  Pt is aware that [employer] could be limited without vital signs or the ability to perform a full hands-on physical exam  Samantha Shields understands she or the provider may request at any time to terminate the video visit and request the patient to seek care or treatment in person

## 2021-08-09 ENCOUNTER — PATIENT MESSAGE (OUTPATIENT)
Dept: FAMILY MEDICINE CLINIC | Facility: CLINIC | Age: 21
End: 2021-08-09

## 2021-08-10 NOTE — TELEPHONE ENCOUNTER
Placed call to pt, she is staying hydrated - she has been having lots of water, advised pt if symptoms arise to go to ED for eval  Pt was understood - not currently having any symptoms but will visit ED is she does

## 2021-08-10 NOTE — TELEPHONE ENCOUNTER
Please check to see that she is staying hydrated  If any shortness of breath, chest pain, rapid heart rate, or if she is still lightheaded today- needs ER eval given recent Covid-19 infection- she is on day 11

## 2021-08-10 NOTE — TELEPHONE ENCOUNTER
Triaged using Telephone Triage Protocols for Nurse, Fifth Edition, by Clemente Wilson  Triaged via the Numbness/Lighheaded category, page  multiple Outcome: pending provider feedback  Patient c/o numbness/tingling to her face and finger tips yesterday upon physical activity resolves once she lays down or sits  Intermittent lightheaded also related to movement and activity  Patient informed feeling much better today no start of symptoms this morning  Please advise

## 2021-08-17 DIAGNOSIS — B34.9 VIRAL ILLNESS: ICD-10-CM

## 2021-08-17 RX ORDER — FLUTICASONE PROPIONATE 50 MCG
SPRAY, SUSPENSION (ML) NASAL
Qty: 16 ML | Refills: 1 | Status: SHIPPED | OUTPATIENT
Start: 2021-08-17 | End: 2021-09-01

## 2021-08-31 DIAGNOSIS — B34.9 VIRAL ILLNESS: ICD-10-CM

## 2021-09-01 RX ORDER — FLUTICASONE PROPIONATE 50 MCG
SPRAY, SUSPENSION (ML) NASAL
Qty: 48 ML | Refills: 1 | Status: SHIPPED | OUTPATIENT
Start: 2021-09-01 | End: 2021-11-09

## 2021-11-09 ENCOUNTER — OFFICE VISIT (OUTPATIENT)
Dept: FAMILY MEDICINE CLINIC | Facility: CLINIC | Age: 21
End: 2021-11-09
Payer: COMMERCIAL

## 2021-11-09 VITALS
BODY MASS INDEX: 48.73 KG/M2 | DIASTOLIC BLOOD PRESSURE: 82 MMHG | HEART RATE: 86 BPM | WEIGHT: 275 LBS | RESPIRATION RATE: 16 BRPM | TEMPERATURE: 97.8 F | SYSTOLIC BLOOD PRESSURE: 128 MMHG | OXYGEN SATURATION: 98 % | HEIGHT: 63 IN

## 2021-11-09 DIAGNOSIS — Z00.00 ANNUAL PHYSICAL EXAM: Primary | ICD-10-CM

## 2021-11-09 DIAGNOSIS — I10 ESSENTIAL HYPERTENSION: ICD-10-CM

## 2021-11-09 DIAGNOSIS — G43.909 MIGRAINE WITHOUT STATUS MIGRAINOSUS, NOT INTRACTABLE, UNSPECIFIED MIGRAINE TYPE: ICD-10-CM

## 2021-11-09 DIAGNOSIS — Z23 ENCOUNTER FOR IMMUNIZATION: ICD-10-CM

## 2021-11-09 DIAGNOSIS — F33.1 DEPRESSION, MAJOR, RECURRENT, MODERATE (HCC): ICD-10-CM

## 2021-11-09 DIAGNOSIS — Z11.3 SCREENING FOR STDS (SEXUALLY TRANSMITTED DISEASES): ICD-10-CM

## 2021-11-09 DIAGNOSIS — Z11.59 NEED FOR HEPATITIS C SCREENING TEST: ICD-10-CM

## 2021-11-09 DIAGNOSIS — F32.1 DEPRESSION, MAJOR, SINGLE EPISODE, MODERATE (HCC): ICD-10-CM

## 2021-11-09 PROCEDURE — 99214 OFFICE O/P EST MOD 30 MIN: CPT | Performed by: FAMILY MEDICINE

## 2021-11-09 PROCEDURE — 90471 IMMUNIZATION ADMIN: CPT

## 2021-11-09 PROCEDURE — 90686 IIV4 VACC NO PRSV 0.5 ML IM: CPT

## 2021-11-09 PROCEDURE — 99395 PREV VISIT EST AGE 18-39: CPT | Performed by: FAMILY MEDICINE

## 2021-11-09 RX ORDER — BUPROPION HYDROCHLORIDE 150 MG/1
150 TABLET ORAL EVERY MORNING
Qty: 90 TABLET | Refills: 1 | Status: SHIPPED | OUTPATIENT
Start: 2021-11-09 | End: 2022-01-04 | Stop reason: SDUPTHER

## 2021-11-09 RX ORDER — SERTRALINE HYDROCHLORIDE 100 MG/1
150 TABLET, FILM COATED ORAL DAILY
Qty: 135 TABLET | Refills: 1 | Status: SHIPPED | OUTPATIENT
Start: 2021-11-09 | End: 2022-01-04 | Stop reason: SDUPTHER

## 2021-11-09 RX ORDER — SUMATRIPTAN 50 MG/1
50 TABLET, FILM COATED ORAL ONCE AS NEEDED
Qty: 10 TABLET | Refills: 3 | Status: SHIPPED | OUTPATIENT
Start: 2021-11-09

## 2022-01-04 DIAGNOSIS — F33.1 DEPRESSION, MAJOR, RECURRENT, MODERATE (HCC): ICD-10-CM

## 2022-01-04 DIAGNOSIS — F32.1 DEPRESSION, MAJOR, SINGLE EPISODE, MODERATE (HCC): ICD-10-CM

## 2022-01-04 RX ORDER — BUPROPION HYDROCHLORIDE 150 MG/1
150 TABLET ORAL EVERY MORNING
Qty: 90 TABLET | Refills: 1 | OUTPATIENT
Start: 2022-01-04 | End: 2022-07-03

## 2022-01-04 RX ORDER — BUPROPION HYDROCHLORIDE 150 MG/1
150 TABLET ORAL EVERY MORNING
Qty: 90 TABLET | Refills: 1 | Status: SHIPPED | OUTPATIENT
Start: 2022-01-04 | End: 2022-07-03

## 2022-01-04 RX ORDER — SERTRALINE HYDROCHLORIDE 100 MG/1
150 TABLET, FILM COATED ORAL DAILY
Qty: 135 TABLET | Refills: 1 | OUTPATIENT
Start: 2022-01-04

## 2022-01-04 RX ORDER — SERTRALINE HYDROCHLORIDE 100 MG/1
150 TABLET, FILM COATED ORAL DAILY
Qty: 135 TABLET | Refills: 1 | Status: SHIPPED | OUTPATIENT
Start: 2022-01-04

## 2022-01-04 NOTE — TELEPHONE ENCOUNTER
Patient called to state that she has not received her medication  Resubmit prescriptions  Patient is out of medication  She has called several times to get script sent over to pharmacy

## 2022-01-06 ENCOUNTER — IMMUNIZATIONS (OUTPATIENT)
Dept: FAMILY MEDICINE CLINIC | Facility: HOSPITAL | Age: 22
End: 2022-01-06

## 2022-01-06 DIAGNOSIS — Z23 ENCOUNTER FOR IMMUNIZATION: Primary | ICD-10-CM

## 2022-01-06 PROCEDURE — 91300 COVID-19 PFIZER VACC 0.3 ML: CPT

## 2022-01-06 PROCEDURE — 0001A COVID-19 PFIZER VACC 0.3 ML: CPT

## 2022-02-01 ENCOUNTER — APPOINTMENT (OUTPATIENT)
Dept: LAB | Age: 22
End: 2022-02-01
Payer: COMMERCIAL

## 2022-02-01 DIAGNOSIS — F33.1 DEPRESSION, MAJOR, RECURRENT, MODERATE (HCC): ICD-10-CM

## 2022-02-01 DIAGNOSIS — Z00.00 ANNUAL PHYSICAL EXAM: ICD-10-CM

## 2022-02-01 DIAGNOSIS — Z11.3 SCREENING FOR STDS (SEXUALLY TRANSMITTED DISEASES): ICD-10-CM

## 2022-02-01 DIAGNOSIS — Z11.59 NEED FOR HEPATITIS C SCREENING TEST: ICD-10-CM

## 2022-02-01 LAB
ALBUMIN SERPL BCP-MCNC: 3.5 G/DL (ref 3.5–5)
ALP SERPL-CCNC: 100 U/L (ref 46–116)
ALT SERPL W P-5'-P-CCNC: 20 U/L (ref 12–78)
ANION GAP SERPL CALCULATED.3IONS-SCNC: 8 MMOL/L (ref 4–13)
AST SERPL W P-5'-P-CCNC: 12 U/L (ref 5–45)
BILIRUB SERPL-MCNC: 0.3 MG/DL (ref 0.2–1)
BUN SERPL-MCNC: 11 MG/DL (ref 5–25)
CALCIUM SERPL-MCNC: 9.1 MG/DL (ref 8.3–10.1)
CHLORIDE SERPL-SCNC: 106 MMOL/L (ref 100–108)
CHOLEST SERPL-MCNC: 179 MG/DL
CO2 SERPL-SCNC: 24 MMOL/L (ref 21–32)
CREAT SERPL-MCNC: 0.79 MG/DL (ref 0.6–1.3)
GFR SERPL CREATININE-BSD FRML MDRD: 105 ML/MIN/1.73SQ M
GLUCOSE P FAST SERPL-MCNC: 110 MG/DL (ref 65–99)
HCV AB SER QL: NORMAL
HDLC SERPL-MCNC: 38 MG/DL
LDLC SERPL CALC-MCNC: 122 MG/DL (ref 0–100)
POTASSIUM SERPL-SCNC: 4.2 MMOL/L (ref 3.5–5.3)
PROT SERPL-MCNC: 7.4 G/DL (ref 6.4–8.2)
SODIUM SERPL-SCNC: 138 MMOL/L (ref 136–145)
TRIGL SERPL-MCNC: 94 MG/DL
TSH SERPL DL<=0.05 MIU/L-ACNC: 1.79 UIU/ML (ref 0.36–3.74)

## 2022-02-01 PROCEDURE — 36415 COLL VENOUS BLD VENIPUNCTURE: CPT

## 2022-02-01 PROCEDURE — 87591 N.GONORRHOEAE DNA AMP PROB: CPT

## 2022-02-01 PROCEDURE — 80053 COMPREHEN METABOLIC PANEL: CPT

## 2022-02-01 PROCEDURE — 84443 ASSAY THYROID STIM HORMONE: CPT

## 2022-02-01 PROCEDURE — 87491 CHLMYD TRACH DNA AMP PROBE: CPT

## 2022-02-01 PROCEDURE — 80061 LIPID PANEL: CPT

## 2022-02-01 PROCEDURE — 86803 HEPATITIS C AB TEST: CPT

## 2022-02-02 LAB
C TRACH DNA SPEC QL NAA+PROBE: NEGATIVE
N GONORRHOEA DNA SPEC QL NAA+PROBE: NEGATIVE

## 2022-04-05 ENCOUNTER — OFFICE VISIT (OUTPATIENT)
Dept: FAMILY MEDICINE CLINIC | Facility: CLINIC | Age: 22
End: 2022-04-05
Payer: COMMERCIAL

## 2022-04-05 VITALS
OXYGEN SATURATION: 99 % | WEIGHT: 279 LBS | HEART RATE: 78 BPM | RESPIRATION RATE: 18 BRPM | SYSTOLIC BLOOD PRESSURE: 126 MMHG | HEIGHT: 63 IN | TEMPERATURE: 97.9 F | DIASTOLIC BLOOD PRESSURE: 86 MMHG | BODY MASS INDEX: 49.43 KG/M2

## 2022-04-05 DIAGNOSIS — J01.00 ACUTE MAXILLARY SINUSITIS, RECURRENCE NOT SPECIFIED: Primary | ICD-10-CM

## 2022-04-05 LAB
SARS-COV-2 AG UPPER RESP QL IA: NEGATIVE
VALID CONTROL: NORMAL

## 2022-04-05 PROCEDURE — 87811 SARS-COV-2 COVID19 W/OPTIC: CPT | Performed by: FAMILY MEDICINE

## 2022-04-05 PROCEDURE — 99213 OFFICE O/P EST LOW 20 MIN: CPT | Performed by: FAMILY MEDICINE

## 2022-04-05 RX ORDER — AMOXICILLIN AND CLAVULANATE POTASSIUM 875; 125 MG/1; MG/1
1 TABLET, FILM COATED ORAL EVERY 12 HOURS SCHEDULED
Qty: 14 TABLET | Refills: 0 | Status: SHIPPED | OUTPATIENT
Start: 2022-04-05 | End: 2022-04-12

## 2022-04-06 NOTE — PROGRESS NOTES
Assessment/Plan:    No problem-specific Assessment & Plan notes found for this encounter  Diagnoses and all orders for this visit:    Acute maxillary sinusitis, recurrence not specified  Comments:  augmentin 875mg BID x 7 days  rest, fluids  Call if no better in 7-10 days or if symptoms worsen      Orders:  -     amoxicillin-clavulanate (Augmentin) 875-125 mg per tablet; Take 1 tablet by mouth every 12 (twelve) hours for 7 days  -     POCT Rapid Covid Ag        Subjective:      Patient ID: Carmen Dunaway is a 19 y o  female  HPI  Pt presents c/o 10 days of congestion, cough, ST sinus congestion  Got sick last week with her child  Mom is also sick  Was getting better over the weekend, and then got worse over the last two days  No fevers  No shortness of breath  The following portions of the patient's history were reviewed and updated as appropriate: allergies, current medications, past family history, past medical history, past social history, past surgical history and problem list     Review of Systems    See hpi    Objective:      /86   Pulse 78   Temp 97 9 °F (36 6 °C)   Resp 18   Ht 5' 3" (1 6 m)   Wt 127 kg (279 lb)   SpO2 99%   BMI 49 42 kg/m²          Physical Exam  Constitutional:       General: She is not in acute distress  Appearance: She is well-developed  HENT:      Head: Normocephalic and atraumatic  Right Ear: Tympanic membrane, ear canal and external ear normal       Left Ear: Tympanic membrane, ear canal and external ear normal       Nose: Mucosal edema present  Mouth/Throat:      Pharynx: Posterior oropharyngeal erythema present  No oropharyngeal exudate  Eyes:      Conjunctiva/sclera: Conjunctivae normal       Pupils: Pupils are equal, round, and reactive to light  Cardiovascular:      Rate and Rhythm: Regular rhythm  Heart sounds: S1 normal and S2 normal  No murmur heard  No friction rub  No gallop  No S3 or S4 sounds      Pulmonary: Effort: Pulmonary effort is normal       Breath sounds: Normal breath sounds  No wheezing or rhonchi  Lymphadenopathy:      Cervical: No cervical adenopathy

## 2022-11-29 ENCOUNTER — HOSPITAL ENCOUNTER (EMERGENCY)
Facility: HOSPITAL | Age: 22
Discharge: HOME | End: 2022-11-29
Attending: EMERGENCY MEDICINE

## 2022-11-29 VITALS
TEMPERATURE: 98 F | HEART RATE: 96 BPM | DIASTOLIC BLOOD PRESSURE: 108 MMHG | SYSTOLIC BLOOD PRESSURE: 178 MMHG | RESPIRATION RATE: 18 BRPM | OXYGEN SATURATION: 98 %

## 2022-11-29 DIAGNOSIS — R10.9 ABDOMINAL PAIN: Primary | ICD-10-CM

## 2022-11-29 LAB
ALBUMIN SERPL BCP-MCNC: 3.4 G/DL (ref 3.5–5)
ALP SERPL-CCNC: 92 U/L (ref 46–116)
ALT SERPL W P-5'-P-CCNC: 22 U/L (ref 12–78)
AMORPH URATE CRY URNS QL MICRO: ABNORMAL
ANION GAP SERPL CALCULATED.3IONS-SCNC: 6 MMOL/L (ref 4–13)
AST SERPL W P-5'-P-CCNC: 13 U/L (ref 5–45)
BACTERIA UR QL AUTO: ABNORMAL /HPF
BASOPHILS # BLD AUTO: 0.05 THOUSANDS/ÂΜL (ref 0–0.1)
BASOPHILS NFR BLD AUTO: 1 % (ref 0–1)
BILIRUB SERPL-MCNC: 0.28 MG/DL (ref 0.2–1)
BILIRUB UR QL STRIP: NEGATIVE
BUN SERPL-MCNC: 9 MG/DL (ref 5–25)
CALCIUM ALBUM COR SERPL-MCNC: 10 MG/DL (ref 8.3–10.1)
CALCIUM SERPL-MCNC: 9.5 MG/DL (ref 8.3–10.1)
CHLORIDE SERPL-SCNC: 109 MMOL/L (ref 96–108)
CLARITY UR: ABNORMAL
CO2 SERPL-SCNC: 22 MMOL/L (ref 21–32)
COLOR UR: YELLOW
CREAT SERPL-MCNC: 0.88 MG/DL (ref 0.6–1.3)
EOSINOPHIL # BLD AUTO: 0.04 THOUSAND/ÂΜL (ref 0–0.61)
EOSINOPHIL NFR BLD AUTO: 0 % (ref 0–6)
ERYTHROCYTE [DISTWIDTH] IN BLOOD BY AUTOMATED COUNT: 13.5 % (ref 11.6–15.1)
EXT PREGNANCY TEST URINE: NEGATIVE
EXT. CONTROL: NORMAL
GFR SERPL CREATININE-BSD FRML MDRD: 92 ML/MIN/1.73SQ M
GLUCOSE SERPL-MCNC: 111 MG/DL (ref 65–140)
GLUCOSE UR STRIP-MCNC: NEGATIVE MG/DL
HCT VFR BLD AUTO: 43.8 % (ref 34.8–46.1)
HGB BLD-MCNC: 13.4 G/DL (ref 11.5–15.4)
HGB UR QL STRIP.AUTO: ABNORMAL
IMM GRANULOCYTES # BLD AUTO: 0.02 THOUSAND/UL (ref 0–0.2)
IMM GRANULOCYTES NFR BLD AUTO: 0 % (ref 0–2)
KETONES UR STRIP-MCNC: ABNORMAL MG/DL
LEUKOCYTE ESTERASE UR QL STRIP: NEGATIVE
LIPASE SERPL-CCNC: 97 U/L (ref 73–393)
LYMPHOCYTES # BLD AUTO: 2.02 THOUSANDS/ÂΜL (ref 0.6–4.47)
LYMPHOCYTES NFR BLD AUTO: 22 % (ref 14–44)
MCH RBC QN AUTO: 25.8 PG (ref 26.8–34.3)
MCHC RBC AUTO-ENTMCNC: 30.6 G/DL (ref 31.4–37.4)
MCV RBC AUTO: 84 FL (ref 82–98)
MONOCYTES # BLD AUTO: 0.4 THOUSAND/ÂΜL (ref 0.17–1.22)
MONOCYTES NFR BLD AUTO: 4 % (ref 4–12)
MUCOUS THREADS UR QL AUTO: ABNORMAL
NEUTROPHILS # BLD AUTO: 6.88 THOUSANDS/ÂΜL (ref 1.85–7.62)
NEUTS SEG NFR BLD AUTO: 73 % (ref 43–75)
NITRITE UR QL STRIP: NEGATIVE
NON-SQ EPI CELLS URNS QL MICRO: ABNORMAL /HPF
NRBC BLD AUTO-RTO: 0 /100 WBCS
PH UR STRIP.AUTO: 6 [PH] (ref 4.5–8)
PLATELET # BLD AUTO: 292 THOUSANDS/UL (ref 149–390)
PMV BLD AUTO: 9.6 FL (ref 8.9–12.7)
POTASSIUM SERPL-SCNC: 4.3 MMOL/L (ref 3.5–5.3)
PROT SERPL-MCNC: 7.8 G/DL (ref 6.4–8.4)
PROT UR STRIP-MCNC: ABNORMAL MG/DL
RBC # BLD AUTO: 5.2 MILLION/UL (ref 3.81–5.12)
RBC #/AREA URNS AUTO: ABNORMAL /HPF
SODIUM SERPL-SCNC: 137 MMOL/L (ref 135–147)
SP GR UR STRIP.AUTO: >=1.03 (ref 1–1.03)
UROBILINOGEN UR QL STRIP.AUTO: 0.2 E.U./DL
WBC # BLD AUTO: 9.41 THOUSAND/UL (ref 4.31–10.16)
WBC #/AREA URNS AUTO: ABNORMAL /HPF

## 2022-11-29 RX ORDER — FAMOTIDINE 20 MG/1
20 TABLET, FILM COATED ORAL ONCE
Status: COMPLETED | OUTPATIENT
Start: 2022-11-29 | End: 2022-11-29

## 2022-11-29 RX ORDER — SUCRALFATE 1 G/1
1 TABLET ORAL ONCE
Status: COMPLETED | OUTPATIENT
Start: 2022-11-29 | End: 2022-11-29

## 2022-11-29 RX ORDER — MAGNESIUM HYDROXIDE/ALUMINUM HYDROXICE/SIMETHICONE 120; 1200; 1200 MG/30ML; MG/30ML; MG/30ML
30 SUSPENSION ORAL ONCE
Status: COMPLETED | OUTPATIENT
Start: 2022-11-29 | End: 2022-11-29

## 2022-11-29 RX ORDER — ALUMINA, MAGNESIA, AND SIMETHICONE 2400; 2400; 240 MG/30ML; MG/30ML; MG/30ML
10 SUSPENSION ORAL EVERY 6 HOURS PRN
Qty: 355 ML | Refills: 0 | Status: SHIPPED | OUTPATIENT
Start: 2022-11-29

## 2022-11-29 RX ORDER — FAMOTIDINE 20 MG/1
20 TABLET, FILM COATED ORAL 2 TIMES DAILY
Qty: 30 TABLET | Refills: 0 | Status: SHIPPED | OUTPATIENT
Start: 2022-11-29

## 2022-11-29 RX ORDER — METOCLOPRAMIDE 10 MG/1
10 TABLET ORAL ONCE
Status: COMPLETED | OUTPATIENT
Start: 2022-11-29 | End: 2022-11-29

## 2022-11-29 RX ORDER — DICYCLOMINE HCL 20 MG
20 TABLET ORAL 2 TIMES DAILY
Qty: 20 TABLET | Refills: 0 | Status: SHIPPED | OUTPATIENT
Start: 2022-11-29

## 2022-11-29 RX ORDER — DICYCLOMINE HCL 20 MG
20 TABLET ORAL ONCE
Status: COMPLETED | OUTPATIENT
Start: 2022-11-29 | End: 2022-11-29

## 2022-11-29 RX ORDER — ONDANSETRON 4 MG/1
4 TABLET, ORALLY DISINTEGRATING ORAL ONCE
Status: COMPLETED | OUTPATIENT
Start: 2022-11-29 | End: 2022-11-29

## 2022-11-29 RX ORDER — SUCRALFATE 1 G/1
1 TABLET ORAL 4 TIMES DAILY
Qty: 40 TABLET | Refills: 0 | Status: SHIPPED | OUTPATIENT
Start: 2022-11-29

## 2022-11-29 RX ORDER — METOCLOPRAMIDE 10 MG/1
10 TABLET ORAL EVERY 6 HOURS
Qty: 30 TABLET | Refills: 0 | Status: SHIPPED | OUTPATIENT
Start: 2022-11-29

## 2022-11-29 RX ADMIN — ALUMINUM HYDROXIDE, MAGNESIUM HYDROXIDE, AND SIMETHICONE 30 ML: 200; 200; 20 SUSPENSION ORAL at 22:16

## 2022-11-29 RX ADMIN — FAMOTIDINE 20 MG: 20 TABLET, FILM COATED ORAL at 22:16

## 2022-11-29 RX ADMIN — SUCRALFATE 1 G: 1 TABLET ORAL at 22:16

## 2022-11-29 RX ADMIN — ONDANSETRON 4 MG: 4 TABLET, ORALLY DISINTEGRATING ORAL at 20:54

## 2022-11-29 RX ADMIN — DICYCLOMINE HYDROCHLORIDE 20 MG: 20 TABLET ORAL at 22:16

## 2022-11-29 RX ADMIN — METOCLOPRAMIDE 10 MG: 10 TABLET ORAL at 22:16

## 2022-11-30 LAB
ATRIAL RATE: 85 BPM
P AXIS: 56 DEGREES
PR INTERVAL: 138 MS
QRS AXIS: 77 DEGREES
QRSD INTERVAL: 82 MS
QT INTERVAL: 372 MS
QTC INTERVAL: 442 MS
T WAVE AXIS: 34 DEGREES
VENTRICULAR RATE: 85 BPM

## 2022-11-30 NOTE — DISCHARGE INSTRUCTIONS
Try the "GI cocktail" at home as needed for pain  Do not take the reglan and zofran at the same time at home  If you develop new or worsening symptoms, please return to the Emergency Department for further evaluation

## 2022-11-30 NOTE — ED PROVIDER NOTES
History  Chief Complaint   Patient presents with   • Abdominal Pain     Lower abdominal pain with persistent nausea/vomiting/diarrhea since Friday  Pt also reports midsternal chest pain     HPI     Patient is a 58-year-old female with past history of kidney stones presenting with abdominal pain  Patient states her pain started Friday, associated nausea, vomiting, diarrhea  Pain in the lower abdomen  No radiation of pain  No urinary symptoms  No fevers  States she was evaluated yesterday at [facility] with normal labs, pelvic ultrasound, CT abdomen without contrast   States that she is feeling better after a shot of Toradol however that has worn off and her pain has returned  Prior to Admission Medications   Prescriptions Last Dose Informant Patient Reported? Taking? SUMAtriptan (IMITREX) 50 mg tablet   No No   Sig: Take 1 tablet (50 mg total) by mouth once as needed for migraine for up to 1 dose May repeat x 1 in one hour     buPROPion (WELLBUTRIN XL) 150 mg 24 hr tablet   No No   Sig: Take 1 tablet (150 mg total) by mouth every morning   hydrochlorothiazide (HYDRODIURIL) 12 5 mg tablet   No No   Sig: Take 1 tablet (12 5 mg total) by mouth daily   levonorgestrel (KYLEENA) 19 5 MG intrauterine device   Yes No   Si Intra Uterine Device by Intrauterine route once   sertraline (ZOLOFT) 100 mg tablet   No No   Sig: Take 1 5 tablets (150 mg total) by mouth daily      Facility-Administered Medications: None       Past Medical History:   Diagnosis Date   • Anxiety    • Asthma     childhood   • Depression    • Elevated glycated hemoglobin    • History of UTI     3 times/yr - last 2018   • Hypertension     chronic   • Kidney stone 2018    during pregnancy   • Migraine     1-2 times/month   • Obesity    • Pseudohypoaldosteronism, type 2    • Severe headache    • Urinary tract infection    • Vaginal delivery        Past Surgical History:   Procedure Laterality Date   • TOOTH EXTRACTION         Family History   Problem Relation Age of Onset   • Hypertension Mother    • Nephrolithiasis Mother    • Anxiety disorder Mother    • Other Mother         Pseudohypoaldosteronism, type 2   • Hypertension Father    • Hyperlipidemia Father    • Hypertension Maternal Grandfather    • Heart disease Maternal Grandfather    • Lupus Maternal Grandfather    • Hypertension Paternal Grandmother    • Heart disease Paternal Grandmother    • Hypertension Paternal Grandfather    • Heart disease Paternal Grandfather    • Diabetes Maternal Uncle    • Arthritis Maternal Grandmother    • Depression Maternal Grandmother    • Anxiety disorder Maternal Grandmother    • Diabetes type II Maternal Grandmother    • Other Brother         Pseudohypoaldosteronism, type 2   • No Known Problems Son    • Breast cancer Neg Hx         paternal cousin      I have reviewed and agree with the history as documented  E-Cigarette/Vaping   • E-Cigarette Use Never User      E-Cigarette/Vaping Substances   • Nicotine No    • THC No    • CBD No    • Flavoring No    • Other No    • Unknown No      Social History     Tobacco Use   • Smoking status: Never   • Smokeless tobacco: Never   Vaping Use   • Vaping Use: Never used   Substance Use Topics   • Alcohol use: Yes     Alcohol/week: 4 0 standard drinks     Types: 4 Cans of beer per week   • Drug use: No        Review of Systems   Constitutional: Negative for chills and fever  HENT: Negative for ear pain and sore throat  Eyes: Negative for pain and visual disturbance  Respiratory: Negative for cough and shortness of breath  Cardiovascular: Negative for chest pain and palpitations  Gastrointestinal: Positive for abdominal pain, diarrhea, nausea and vomiting  Genitourinary: Negative for dysuria and hematuria  Musculoskeletal: Negative for arthralgias and back pain  Skin: Negative for color change and rash  Neurological: Negative for seizures and syncope     All other systems reviewed and are negative  Physical Exam  ED Triage Vitals [11/29/22 2022]   Temperature Pulse Respirations Blood Pressure SpO2   98 °F (36 7 °C) 96 18 (!) 178/108 98 %      Temp Source Heart Rate Source Patient Position - Orthostatic VS BP Location FiO2 (%)   Oral Monitor -- -- --      Pain Score       --             Orthostatic Vital Signs  Vitals:    11/29/22 2022   BP: (!) 178/108   Pulse: 96       Physical Exam  Vitals and nursing note reviewed  Constitutional:       General: She is not in acute distress  Appearance: She is obese  She is not ill-appearing  HENT:      Head: Normocephalic and atraumatic  Right Ear: External ear normal       Left Ear: External ear normal       Nose: Nose normal       Mouth/Throat:      Pharynx: Oropharynx is clear  Eyes:      Extraocular Movements: Extraocular movements intact  Pupils: Pupils are equal, round, and reactive to light  Cardiovascular:      Rate and Rhythm: Normal rate and regular rhythm  Pulses: Normal pulses  Heart sounds: Normal heart sounds  No murmur heard  No friction rub  No gallop  Pulmonary:      Effort: Pulmonary effort is normal  No respiratory distress  Breath sounds: Normal breath sounds  No wheezing, rhonchi or rales  Abdominal:      General: Abdomen is flat  There is no distension  Palpations: Abdomen is soft  Tenderness: There is abdominal tenderness in the periumbilical area  There is no guarding or rebound  Musculoskeletal:         General: No deformity  Normal range of motion  Cervical back: Normal range of motion  Right lower leg: No edema  Left lower leg: No edema  Skin:     General: Skin is warm and dry  Capillary Refill: Capillary refill takes less than 2 seconds  Findings: No rash  Neurological:      General: No focal deficit present  Mental Status: She is alert and oriented to person, place, and time        Gait: Gait normal    Psychiatric:         Mood and Affect: Mood normal          ED Medications  Medications   ondansetron (ZOFRAN-ODT) dispersible tablet 4 mg (4 mg Oral Given 11/29/22 2054)   dicyclomine (BENTYL) tablet 20 mg (20 mg Oral Given 11/29/22 2216)   famotidine (PEPCID) tablet 20 mg (20 mg Oral Given 11/29/22 2216)   metoclopramide (REGLAN) tablet 10 mg (10 mg Oral Given 11/29/22 2216)   sucralfate (CARAFATE) tablet 1 g (1 g Oral Given 11/29/22 2216)   aluminum-magnesium hydroxide-simethicone (MYLANTA) oral suspension 30 mL (30 mL Oral Given 11/29/22 2216)       Diagnostic Studies  Results Reviewed     Procedure Component Value Units Date/Time    Urine Microscopic [[id number]]  (Abnormal) Collected: 11/29/22 2218    Lab Status: Final result Specimen: Urine, Clean Catch Updated: 11/29/22 2242     RBC, UA Innumerable /hpf      WBC, UA 4-10 /hpf      Epithelial Cells Moderate /hpf      Bacteria, UA Occasional /hpf      MUCUS THREADS Occasional     Amorphous Crystals, UA Occasional    POCT pregnancy, urine [[id number]]  (Normal) Resulted: 11/29/22 2220    Lab Status: Final result Updated: 11/29/22 2221     EXT Preg Test, Ur Negative     Control Valid    Urine Macroscopic, POC [[id number]]  (Abnormal) Collected: 11/29/22 2218    Lab Status: Final result Specimen: Urine Updated: 11/29/22 2219     Color, UA Yellow     Clarity, UA Slightly Cloudy     pH, UA 6 0     Leukocytes, UA Negative     Nitrite, UA Negative     Protein, UA 30 (1+) mg/dl      Glucose, UA Negative mg/dl      Ketones, UA 15 (1+) mg/dl      Urobilinogen, UA 0 2 E U /dl      Bilirubin, UA Negative     Occult Blood, UA Large     Specific Gravity, UA >=1 030    Narrative:      CLINITEK RESULT    Comprehensive metabolic panel [[id number]]  (Abnormal) Collected: 11/29/22 2043    Lab Status: Final result Specimen: Blood from Arm, Left Updated: 11/29/22 2116     Sodium 137 mmol/L      Potassium 4 3 mmol/L      Chloride 109 mmol/L      CO2 22 mmol/L      ANION GAP 6 mmol/L      BUN 9 mg/dL      Creatinine 0 88 mg/dL      Glucose 111 mg/dL      Calcium 9 5 mg/dL      Corrected Calcium 10 0 mg/dL      AST 13 U/L      ALT 22 U/L      Alkaline Phosphatase 92 U/L      Total Protein 7 8 g/dL      Albumin 3 4 g/dL      Total Bilirubin 0 28 mg/dL      eGFR 92 ml/min/1 73sq m     Narrative:      [place] guidelines for Chronic Kidney Disease (CKD):   •  Stage 1 with normal or high GFR (GFR > 90 mL/min/1 73 square meters)  •  Stage 2 Mild CKD (GFR = 60-89 mL/min/1 73 square meters)  •  Stage 3A Moderate CKD (GFR = 45-59 mL/min/1 73 square meters)  •  Stage 3B Moderate CKD (GFR = 30-44 mL/min/1 73 square meters)  •  Stage 4 Severe CKD (GFR = 15-29 mL/min/1 73 square meters)  •  Stage 5 End Stage CKD (GFR <15 mL/min/1 73 square meters)  Note: GFR calculation is accurate only with a steady state creatinine    Lipase [[id number]]  (Normal) Collected: 11/29/22 2043    Lab Status: Final result Specimen: Blood from Arm, Left Updated: 11/29/22 2116     Lipase 97 u/L     CBC and differential [[id number]]  (Abnormal) Collected: 11/29/22 2043    Lab Status: Final result Specimen: Blood from Arm, Left Updated: 11/29/22 2051     WBC 9 41 Thousand/uL      RBC 5 20 Million/uL      Hemoglobin 13 4 g/dL      Hematocrit 43 8 %      MCV 84 fL      MCH 25 8 pg      MCHC 30 6 g/dL      RDW 13 5 %      MPV 9 6 fL      Platelets 978 Thousands/uL      nRBC 0 /100 WBCs      Neutrophils Relative 73 %      Immat GRANS % 0 %      Lymphocytes Relative 22 %      Monocytes Relative 4 %      Eosinophils Relative 0 %      Basophils Relative 1 %      Neutrophils Absolute 6 88 Thousands/µL      Immature Grans Absolute 0 02 Thousand/uL      Lymphocytes Absolute 2 02 Thousands/µL      Monocytes Absolute 0 40 Thousand/µL      Eosinophils Absolute 0 04 Thousand/µL      Basophils Absolute 0 05 Thousands/µL                  No orders to display         Procedures  Procedures      ED Course                             SBIRT 22yo+    Flowsheet Row Most Recent Value   SBIRT (24 yo +)    In order to provide better care to our patients, we are screening all of our patients for alcohol and drug use  Would it be okay to ask you these screening questions? Unable to answer at this time Filed at: 11/29/2022 2140                [facility]  Number of Diagnoses or Management Options  Abdominal pain  Diagnosis management comments: 77-year-old female presenting with abdominal pain, previously worked up at outside hospital   Vital signs reviewed  Exam nonfocal   Shared decision making with patient, given normal workup yesterday, will attempt to treat pain and reassess, if pain is not improving or worsening will perform CT with contrast   After reassessment, patient states she she is feeling better and comfortable going home  Will prescribe GI cocktail for home  Return precautions discussed  Patient recommended to follow up outpatient team       Disposition  Final diagnoses:   Abdominal pain     Time reflects when diagnosis was documented in both MDM as applicable and the Disposition within this note     Time User Action Codes Description Comment    11/29/2022 11:54 PM Dianne Innocent Add [R10 9] Abdominal pain       ED Disposition     ED Disposition   Discharge    Condition   Stable    Date/Time   Tue Nov 29, 2022 11:52 PM    Comment   Piter Lux discharge to home/self care  Follow-up Information     Follow up With Specialties Details Why Contact Info    Lori Walker MD [address]  [phone]            Discharge Medication List as of 11/29/2022 11:54 PM      START taking these medications    Details   aluminum-magnesium hydroxide-simethicone (MAALOX MAX) 400-400-40 MG/5ML suspension Take 10 mL by mouth every 6 (six) hours as needed for indigestion or heartburn, Starting Tue 11/29/2022, Normal      dicyclomine (BENTYL) 20 mg tablet Take 1 tablet (20 mg total) by mouth 2 (two) times a day, Starting Tue 11/29/2022, Normal      famotidine (PEPCID) 20 mg tablet Take 1 tablet (20 mg total) by mouth 2 (two) times a day, Starting Tue 11/29/2022, Normal      metoclopramide (Reglan) 10 mg tablet Take 1 tablet (10 mg total) by mouth every 6 (six) hours, Starting Tue 11/29/2022, Normal      sucralfate (CARAFATE) 1 g tablet Take 1 tablet (1 g total) by mouth 4 (four) times a day, Starting Tue 11/29/2022, Normal         CONTINUE these medications which have NOT CHANGED    Details   buPROPion (WELLBUTRIN XL) 150 mg 24 hr tablet Take 1 tablet (150 mg total) by mouth every morning, Starting Tue 1/4/2022, Until Sun 7/3/2022, Normal      hydrochlorothiazide (HYDRODIURIL) 12 5 mg tablet Take 1 tablet (12 5 mg total) by mouth daily, Starting Mon 11/11/2019, Normal      levonorgestrel (KYLEENA) 19 5 MG intrauterine device 1 Intra Uterine Device by Intrauterine route once, Historical Med      sertraline (ZOLOFT) 100 mg tablet Take 1 5 tablets (150 mg total) by mouth daily, Starting Tue 1/4/2022, Normal      SUMAtriptan (IMITREX) 50 mg tablet Take 1 tablet (50 mg total) by mouth once as needed for migraine for up to 1 dose May repeat x 1 in one hour , Starting Tue 11/9/2021, Normal           No discharge procedures on file  PDMP Review     None           ED Provider  Attending physically available and evaluated Yordan Ferraro I managed the patient along with the ED Attending      Electronically Signed by         Karel Pizano MD  12/01/22 4567

## 2022-12-04 NOTE — ED ATTENDING ATTESTATION
11/29/2022  IJorge MD, saw and evaluated the patient  I have discussed the patient with the resident/non-physician practitioner and agree with the resident's/non-physician practitioner's findings, Plan of Care, and MDM as documented in the resident's/non-physician practitioner's note, except where noted  All available labs and Radiology studies were reviewed  I was present for key portions of any procedure(s) performed by the resident/non-physician practitioner and I was immediately available to provide assistance  At this point I agree with the current assessment done in the Emergency Department  I have conducted an independent evaluation of this patient a history and physical is as follows:    ED Course    20-year-old female presents with left lower abdominal pain since Friday  Patient was seen in outside hospital head CT scan ultrasound done which were unremarkable history of IUD patient was told that she did have fluid for left fallopian to denies vaginal bleeding or discharge  Vital signs reviewed abdomen is soft with mild left lower quadrant tenderness to palpation no guarding or rebound noted  Normal bowel sounds no signs of peritonitis  Impression left lower quadrant abdominal pain unclear etiology  Will check screening labs trial pain control anticipate discharge with outpatient follow-up OBGYN        Critical Care Time  Procedures
